# Patient Record
Sex: MALE | Race: BLACK OR AFRICAN AMERICAN | NOT HISPANIC OR LATINO | Employment: FULL TIME | URBAN - METROPOLITAN AREA
[De-identification: names, ages, dates, MRNs, and addresses within clinical notes are randomized per-mention and may not be internally consistent; named-entity substitution may affect disease eponyms.]

---

## 2017-01-21 DIAGNOSIS — I10 ESSENTIAL HYPERTENSION: ICD-10-CM

## 2017-01-23 RX ORDER — METOPROLOL SUCCINATE 25 MG/1
TABLET, EXTENDED RELEASE ORAL
Qty: 30 TABLET | Refills: 2 | Status: SHIPPED | OUTPATIENT
Start: 2017-01-23 | End: 2017-05-03 | Stop reason: SDUPTHER

## 2017-01-23 NOTE — TELEPHONE ENCOUNTER
Patient due for follow-up (not urgent), no-showed last appt. Please contact to reschedule. Med refill sent in

## 2017-01-25 NOTE — TELEPHONE ENCOUNTER
Spoke with pt-- unable to schedule pt's appointment because pt has been dismissed from the [facility] area due to credit risk. Pt advised to contact [facility].

## 2017-03-27 ENCOUNTER — TELEPHONE (OUTPATIENT)
Dept: SMOKING CESSATION | Facility: CLINIC | Age: 57
End: 2017-03-27

## 2017-03-28 ENCOUNTER — TELEPHONE (OUTPATIENT)
Dept: SMOKING CESSATION | Facility: CLINIC | Age: 57
End: 2017-03-28

## 2017-03-29 ENCOUNTER — TELEPHONE (OUTPATIENT)
Dept: SMOKING CESSATION | Facility: CLINIC | Age: 57
End: 2017-03-29

## 2017-04-17 DIAGNOSIS — E11.9 TYPE 2 DIABETES MELLITUS WITHOUT COMPLICATION: ICD-10-CM

## 2017-05-03 DIAGNOSIS — I10 ESSENTIAL HYPERTENSION: ICD-10-CM

## 2017-05-03 RX ORDER — METOPROLOL SUCCINATE 25 MG/1
TABLET, EXTENDED RELEASE ORAL
Qty: 30 TABLET | Refills: 1 | Status: SHIPPED | OUTPATIENT
Start: 2017-05-03 | End: 2017-06-29 | Stop reason: SDUPTHER

## 2017-05-08 DIAGNOSIS — E11.9 TYPE 2 DIABETES MELLITUS WITHOUT COMPLICATION: ICD-10-CM

## 2017-05-28 DIAGNOSIS — I10 ESSENTIAL HYPERTENSION: ICD-10-CM

## 2017-05-28 DIAGNOSIS — E11.9 CONTROLLED TYPE 2 DIABETES MELLITUS WITHOUT COMPLICATION, WITH LONG-TERM CURRENT USE OF INSULIN: Primary | ICD-10-CM

## 2017-05-28 DIAGNOSIS — E78.5 DYSLIPIDEMIA: ICD-10-CM

## 2017-05-28 DIAGNOSIS — D64.9 ANEMIA, UNSPECIFIED TYPE: ICD-10-CM

## 2017-05-28 DIAGNOSIS — Z79.4 CONTROLLED TYPE 2 DIABETES MELLITUS WITHOUT COMPLICATION, WITH LONG-TERM CURRENT USE OF INSULIN: Primary | ICD-10-CM

## 2017-05-29 RX ORDER — LOSARTAN POTASSIUM 100 MG/1
TABLET ORAL
Qty: 90 TABLET | Refills: 0 | Status: SHIPPED | OUTPATIENT
Start: 2017-05-29 | End: 2019-02-21 | Stop reason: SDUPTHER

## 2017-05-29 NOTE — TELEPHONE ENCOUNTER
No appointments can be scheduled for this patient until he make arrangements with [facility] [phone]

## 2017-05-29 NOTE — TELEPHONE ENCOUNTER
Patient no-show to last appt, needs to reschedule follow up appt. Please contact him to schedule. Refill sent in for 90-day supply but he will need to come in for appt for further refills.     He is also due for labs - CBC, BMP, A1c, lipids, microalb/creatinine. Please schedule for fasting labs before visit

## 2017-05-30 NOTE — TELEPHONE ENCOUNTER
Please check with him to see if he plans to continue with primary care here or establish elsewhere. Happy to see him here if that's his plan; otherwise I do want him to continue to have primary care somewhere, and unfortunately cannot continue to fill his medications indefinitely without a visit or labs.

## 2017-06-16 DIAGNOSIS — E11.9 TYPE 2 DIABETES MELLITUS WITHOUT COMPLICATION: ICD-10-CM

## 2017-06-16 DIAGNOSIS — E78.5 DYSLIPIDEMIA: ICD-10-CM

## 2017-06-16 RX ORDER — ATORVASTATIN CALCIUM 20 MG/1
TABLET, FILM COATED ORAL
Qty: 90 TABLET | Refills: 0 | Status: SHIPPED | OUTPATIENT
Start: 2017-06-16 | End: 2019-02-21 | Stop reason: SDUPTHER

## 2017-06-16 RX ORDER — METFORMIN HYDROCHLORIDE 500 MG/1
TABLET ORAL
Qty: 180 TABLET | Refills: 0 | Status: SHIPPED | OUTPATIENT
Start: 2017-06-16 | End: 2019-02-21 | Stop reason: SDUPTHER

## 2017-06-19 DIAGNOSIS — I10 ESSENTIAL HYPERTENSION: Chronic | ICD-10-CM

## 2017-06-19 RX ORDER — AMLODIPINE BESYLATE 10 MG/1
TABLET ORAL
Qty: 90 TABLET | Refills: 0 | Status: CANCELLED | OUTPATIENT
Start: 2017-06-19

## 2017-06-21 DIAGNOSIS — I10 ESSENTIAL HYPERTENSION: Chronic | ICD-10-CM

## 2017-06-21 RX ORDER — AMLODIPINE BESYLATE 10 MG/1
10 TABLET ORAL DAILY
Qty: 90 TABLET | Refills: 0 | Status: SHIPPED | OUTPATIENT
Start: 2017-06-21 | End: 2019-02-21 | Stop reason: SDUPTHER

## 2017-06-21 NOTE — TELEPHONE ENCOUNTER
----- Message from Belia Mcintosh sent at 6/21/2017  9:47 AM CDT -----  Contact: [facility]  Refill    amlodipine (NORVASC) 10 MG tablet   Sig - Route: Take 1 tablet (10 mg total) by mouth once daily. - Oral    90 day fill    [facility] [id number] [place] [address] [address] AT [facility] [phone] (phone) [phone] (fax)    Thanks!

## 2017-06-22 NOTE — TELEPHONE ENCOUNTER
If he is not able to follow up here, I would recommend that he establish with another primary care provider which is more affordable for him; I am happy to see him back whenever he can schedule an appointment, but he does need to follow with a primary care provider somewhere due to his high blood pressure and diabetes.

## 2017-06-22 NOTE — TELEPHONE ENCOUNTER
Called pt left Vm requesting return call. No appointments can be scheduled for pt due credit Risk. Please advise.

## 2017-06-22 NOTE — TELEPHONE ENCOUNTER
Patient overdue for follow up. No show to last scheduled appt 12/2016.  Please contact him to schedule new follow up appt.  Refill sent but he needs appointment for further refills to be given

## 2017-06-29 DIAGNOSIS — I10 ESSENTIAL HYPERTENSION: ICD-10-CM

## 2017-06-29 RX ORDER — METOPROLOL SUCCINATE 25 MG/1
TABLET, EXTENDED RELEASE ORAL
Qty: 30 TABLET | Refills: 1 | Status: SHIPPED | OUTPATIENT
Start: 2017-06-29 | End: 2019-02-21 | Stop reason: SDUPTHER

## 2017-09-14 ENCOUNTER — TELEPHONE (OUTPATIENT)
Dept: SMOKING CESSATION | Facility: CLINIC | Age: 57
End: 2017-09-14

## 2017-09-15 ENCOUNTER — TELEPHONE (OUTPATIENT)
Dept: SMOKING CESSATION | Facility: CLINIC | Age: 57
End: 2017-09-15

## 2017-09-18 ENCOUNTER — TELEPHONE (OUTPATIENT)
Dept: SMOKING CESSATION | Facility: CLINIC | Age: 57
End: 2017-09-18

## 2017-10-24 ENCOUNTER — TELEPHONE (OUTPATIENT)
Dept: SMOKING CESSATION | Facility: CLINIC | Age: 57
End: 2017-10-24

## 2017-10-25 ENCOUNTER — TELEPHONE (OUTPATIENT)
Dept: SMOKING CESSATION | Facility: CLINIC | Age: 57
End: 2017-10-25

## 2017-10-31 ENCOUNTER — TELEPHONE (OUTPATIENT)
Dept: SMOKING CESSATION | Facility: CLINIC | Age: 57
End: 2017-10-31

## 2018-03-22 ENCOUNTER — TELEPHONE (OUTPATIENT)
Dept: SMOKING CESSATION | Facility: CLINIC | Age: 58
End: 2018-03-22

## 2018-03-28 ENCOUNTER — TELEPHONE (OUTPATIENT)
Dept: SMOKING CESSATION | Facility: CLINIC | Age: 58
End: 2018-03-28

## 2018-07-14 ENCOUNTER — PATIENT MESSAGE (OUTPATIENT)
Dept: ADMINISTRATIVE | Facility: OTHER | Age: 58
End: 2018-07-14

## 2018-07-18 ENCOUNTER — PATIENT MESSAGE (OUTPATIENT)
Dept: ADMINISTRATIVE | Facility: OTHER | Age: 58
End: 2018-07-18

## 2018-08-20 ENCOUNTER — PATIENT MESSAGE (OUTPATIENT)
Dept: ADMINISTRATIVE | Facility: HOSPITAL | Age: 58
End: 2018-08-20

## 2019-02-21 ENCOUNTER — HOSPITAL ENCOUNTER (OUTPATIENT)
Dept: RADIOLOGY | Facility: HOSPITAL | Age: 59
Discharge: HOME | End: 2019-02-21
Attending: INTERNAL MEDICINE
Payer: COMMERCIAL

## 2019-02-21 ENCOUNTER — OFFICE VISIT (OUTPATIENT)
Dept: INTERNAL MEDICINE | Facility: CLINIC | Age: 59
End: 2019-02-21
Payer: COMMERCIAL

## 2019-02-21 VITALS
WEIGHT: 231.94 LBS | DIASTOLIC BLOOD PRESSURE: 96 MMHG | OXYGEN SATURATION: 97 % | HEART RATE: 67 BPM | BODY MASS INDEX: 36.4 KG/M2 | SYSTOLIC BLOOD PRESSURE: 180 MMHG | TEMPERATURE: 99 F | HEIGHT: 67 IN

## 2019-02-21 DIAGNOSIS — J06.9 UPPER RESPIRATORY TRACT INFECTION, UNSPECIFIED TYPE: ICD-10-CM

## 2019-02-21 DIAGNOSIS — Z72.0 SMOKING TRYING TO QUIT: ICD-10-CM

## 2019-02-21 DIAGNOSIS — E78.5 DYSLIPIDEMIA: ICD-10-CM

## 2019-02-21 DIAGNOSIS — F10.10 ALCOHOL ABUSE, DAILY USE: ICD-10-CM

## 2019-02-21 DIAGNOSIS — L73.2 HIDRADENITIS SUPPURATIVA: ICD-10-CM

## 2019-02-21 DIAGNOSIS — R91.8 LUNG FIELD ABNORMAL FINDING ON EXAMINATION: ICD-10-CM

## 2019-02-21 DIAGNOSIS — F17.210 CIGARETTE NICOTINE DEPENDENCE WITHOUT COMPLICATION: Chronic | ICD-10-CM

## 2019-02-21 DIAGNOSIS — I10 ESSENTIAL HYPERTENSION: Primary | Chronic | ICD-10-CM

## 2019-02-21 DIAGNOSIS — E11.9 TYPE 2 DIABETES MELLITUS WITHOUT COMPLICATION, WITHOUT LONG-TERM CURRENT USE OF INSULIN: ICD-10-CM

## 2019-02-21 DIAGNOSIS — Z63.4 BEREAVEMENT: ICD-10-CM

## 2019-02-21 LAB
INFLUENZA A, MOLECULAR: NEGATIVE
INFLUENZA B, MOLECULAR: NEGATIVE
SPECIMEN SOURCE: NORMAL

## 2019-02-21 PROCEDURE — 3008F PR BODY MASS INDEX (BMI) DOCUMENTED: ICD-10-PCS | Mod: CPTII,S$GLB,, | Performed by: INTERNAL MEDICINE

## 2019-02-21 PROCEDURE — 99999 PR PBB SHADOW E&M-EST. PATIENT-LVL V: CPT | Mod: PBBFAC,,, | Performed by: INTERNAL MEDICINE

## 2019-02-21 PROCEDURE — 3080F PR MOST RECENT DIASTOLIC BLOOD PRESSURE >= 90 MM HG: ICD-10-PCS | Mod: CPTII,S$GLB,, | Performed by: INTERNAL MEDICINE

## 2019-02-21 PROCEDURE — 3077F SYST BP >= 140 MM HG: CPT | Mod: CPTII,S$GLB,, | Performed by: INTERNAL MEDICINE

## 2019-02-21 PROCEDURE — 3077F PR MOST RECENT SYSTOLIC BLOOD PRESSURE >= 140 MM HG: ICD-10-PCS | Mod: CPTII,S$GLB,, | Performed by: INTERNAL MEDICINE

## 2019-02-21 PROCEDURE — 71046 X-RAY EXAM CHEST 2 VIEWS: CPT | Mod: TC

## 2019-02-21 PROCEDURE — 99215 PR OFFICE/OUTPT VISIT, EST, LEVL V, 40-54 MIN: ICD-10-PCS | Mod: S$GLB,,, | Performed by: INTERNAL MEDICINE

## 2019-02-21 PROCEDURE — 71046 XR CHEST PA AND LATERAL: ICD-10-PCS | Mod: 26,,, | Performed by: RADIOLOGY

## 2019-02-21 PROCEDURE — 3008F BODY MASS INDEX DOCD: CPT | Mod: CPTII,S$GLB,, | Performed by: INTERNAL MEDICINE

## 2019-02-21 PROCEDURE — 99215 OFFICE O/P EST HI 40 MIN: CPT | Mod: S$GLB,,, | Performed by: INTERNAL MEDICINE

## 2019-02-21 PROCEDURE — 99999 PR PBB SHADOW E&M-EST. PATIENT-LVL V: ICD-10-PCS | Mod: PBBFAC,,, | Performed by: INTERNAL MEDICINE

## 2019-02-21 PROCEDURE — 87502 INFLUENZA DNA AMP PROBE: CPT

## 2019-02-21 PROCEDURE — 3080F DIAST BP >= 90 MM HG: CPT | Mod: CPTII,S$GLB,, | Performed by: INTERNAL MEDICINE

## 2019-02-21 PROCEDURE — 71046 X-RAY EXAM CHEST 2 VIEWS: CPT | Mod: 26,,, | Performed by: RADIOLOGY

## 2019-02-21 RX ORDER — AMLODIPINE BESYLATE 10 MG/1
10 TABLET ORAL DAILY
Qty: 90 TABLET | Refills: 1 | Status: SHIPPED | OUTPATIENT
Start: 2019-02-21 | End: 2019-05-23

## 2019-02-21 RX ORDER — BLOOD SUGAR DIAGNOSTIC
STRIP MISCELLANEOUS
Qty: 100 EACH | Refills: 3 | Status: SHIPPED | OUTPATIENT
Start: 2019-02-21

## 2019-02-21 RX ORDER — METFORMIN HYDROCHLORIDE 500 MG/1
TABLET ORAL
Qty: 180 TABLET | Refills: 1 | Status: SHIPPED | OUTPATIENT
Start: 2019-02-21 | End: 2019-10-22

## 2019-02-21 RX ORDER — AMOXICILLIN AND CLAVULANATE POTASSIUM 875; 125 MG/1; MG/1
1 TABLET, FILM COATED ORAL EVERY 12 HOURS
Qty: 10 TABLET | Refills: 0 | Status: SHIPPED | OUTPATIENT
Start: 2019-02-21 | End: 2019-02-26

## 2019-02-21 RX ORDER — LOSARTAN POTASSIUM 100 MG/1
TABLET ORAL
Qty: 90 TABLET | Refills: 1 | Status: SHIPPED | OUTPATIENT
Start: 2019-02-21 | End: 2020-05-07 | Stop reason: SDUPTHER

## 2019-02-21 RX ORDER — METOPROLOL SUCCINATE 25 MG/1
TABLET, EXTENDED RELEASE ORAL
Qty: 30 TABLET | Refills: 5 | Status: SHIPPED | OUTPATIENT
Start: 2019-02-21 | End: 2019-03-20 | Stop reason: ALTCHOICE

## 2019-02-21 RX ORDER — ATORVASTATIN CALCIUM 20 MG/1
20 TABLET, FILM COATED ORAL DAILY
Qty: 90 TABLET | Refills: 3 | Status: SHIPPED | OUTPATIENT
Start: 2019-02-21 | End: 2019-10-22

## 2019-02-21 SDOH — SOCIAL DETERMINANTS OF HEALTH (SDOH): DISSAPEARANCE AND DEATH OF FAMILY MEMBER: Z63.4

## 2019-02-21 NOTE — PATIENT INSTRUCTIONS
Blood Pressure Measurement:    -- Please record your blood pressure every day. When checking, make sure you have been sitting for about 5 minutes, your legs are uncrossed, and the blood pressure cuff at the level of your heart. Record your blood pressure with the date and time in a log and bring it with you to every doctor's visit.  -- Goal blood pressure is top number less than 130 and bottom number less than 80.  -- If your blood pressure is >160/>100 on two consecutive occasions, contact the office. If it is >180/>120 and you are having confusion, chest pain or back pain, shortness of breath, or blood in the urine, go to the emergency room immediately.      Established High Blood Pressure    High blood pressure (hypertension) is a chronic disease. Often, healthcare providers dont know what causes it. But it can be caused by certain health conditions and medicines.  If you have high blood pressure, you may not have any symptoms. If you do have symptoms, they may include headache, dizziness, changes in your vision, chest pain, and shortness of breath. But even without symptoms, high blood pressure thats not treated raises your risk for heart attack and stroke. High blood pressure is a serious health risk and shouldnt be ignored.  A blood pressure reading is made up of two numbers: a higher number over a lower number. The top number is the systolic pressure. The bottom number is the diastolic pressure. A normal blood pressure is a systolic pressure of  less than 120 over a diastolic pressure of less than 80. You will see your blood pressure readings written together. For example, a person with a systolic pressure of 188 and a diastolic pressure of 78 will have 118/78 written in the medical record.  High blood pressure is when either the top number is 140 or higher, or the bottom number is 90 or higher. This must be the result when taking your blood pressure a number of times. The blood pressures between normal  and high are called prehypertension.  Home care  If you have high blood pressure, you should do what is listed below to lower your blood pressure. If you are taking medicines for high blood pressure, these methods may reduce or end your need for medicines in the future.  · Begin a weight-loss program if you are overweight.  · Cut back on how much salt you get in your diet. Heres how to do this:  ¨ Dont eat foods that have a lot of salt. These include olives, pickles, smoked meats, and salted potato chips.  ¨ Dont add salt to your food at the table.  ¨ Use only small amounts of salt when cooking.  · Start an exercise program. Talk with your healthcare provider about the type of exercise program that would be best for you. It doesn't have to be hard. Even brisk walking for 20 minutes 3 times a week is a good form of exercise.  · Dont take medicines that stimulate the heart. This includes many over-the-counter cold and sinus decongestant pills and sprays, as well as diet pills. Check the warnings about hypertension on the label. Before buying any over-the-counter medicines or supplements, always ask the pharmacist about the product's potential interaction with your high blood pressure and your high blood pressure medicines.  · Stimulants such as amphetamine or cocaine could be deadly for someone with high blood pressure. Never take these.  · Limit how much caffeine you get in your diet. Switch to caffeine-free products.  · Stop smoking. If you are a long-time smoker, this can be hard. Talk to your healthcare provider about medicines and nicotine replacement options to help you. Also, enroll in a stop-smoking program to make it more likely that you will quit for good.  · Learn how to handle stress. This is an important part of any program to lower blood pressure. Learn about relaxation methods like meditation, yoga, or biofeedback.  · If your provider prescribed medicines, take them exactly as directed. Missing  doses may cause your blood pressure get out of control.  · If you miss a dose or doses, check with your healthcare provider or pharmacist about what to do.  · Consider buying an automatic blood pressure machine. Ask your provider for a recommendation. You can get one of these at most pharmacies.     The American Heart Association recommends the following guidelines for home blood pressure monitoring:  · Don't smoke or drink coffee for 30 minutes before taking your blood pressure.  · Go to the bathroom before the test.  · Relax for 5 minutes before taking the measurement.  · Sit with your back supported (don't sit on a couch or soft chair); keep your feet on the floor uncrossed. Place your arm on a solid flat surface (like a table) with the upper part of the arm at heart level. Place the middle of the cuff directly above the eye of the elbow. Check the monitor's instruction manual for an illustration.  · Take multiple readings. When you measure, take 2 to 3 readings one minute apart and record all of the results.  · Take your blood pressure at the same time every day, or as your healthcare provider recommends.  · Record the date, time, and blood pressure reading.  · Take the record with you to your next medical appointment. If your blood pressure monitor has a built-in memory, simply take the monitor with you to your next appointment.  · Call your provider if you have several high readings. Don't be frightened by a single high blood pressure reading, but if you get several high readings, check in with your healthcare provider.  · Note: When blood pressure reaches a systolic (top number) of 180 or higher OR diastolic (bottom number) of 110 or higher, seek emergency medical treatment.  Follow-up care  You will need to see your healthcare provider regularly. This is to check your blood pressure and to make changes to your medicines. Make a follow-up appointment as directed. Bring the record of your home blood pressure  readings to the appointment.  When to seek medical advice  Call your healthcare provider right away if any of these occur:  · Blood pressure reaches a systolic (upper number) of 180 or higher OR a diastolic (bottom number) of 110 or higher  · Chest pain or shortness of breath  · Severe headache  · Throbbing or rushing sound in the ears  · Nosebleed  · Sudden severe pain in your belly (abdomen)  · Extreme drowsiness, confusion, or fainting  · Dizziness or spinning sensation (vertigo)  · Weakness of an arm or leg or one side of the face  · You have problems speaking or seeing   Date Last Reviewed: 12/1/2016 © 2000-2017 [employer]. [address] [place] [address], [place] [address]. All rights reserved. This information is not intended as a substitute for professional medical care. Always follow your healthcare professional's instructions.          Managing Diabetes: The A1C Test       Healthy red blood cells have some glucose stuck to them. A high A1C means that unhealthy amounts of glucose are stuck to the cells.   What is the A1C test?  Using your meter helps you track your blood sugar every day. But your glucose meter tells you the value at the time of testing only. You also need to know if your treatment plan is keeping you healthy over time. The hemoglobin A1C (or glycated hemoglobin) test can help. This test measures your average blood sugar level over a few months. A higher A1C result means that you have a higher risk of developing complications.  The A1C test  The A1C is a blood test done by your healthcare provider. You will likely have an A1C test every 3 to 6 months.  Your blood glucose goal  A1C has been shown as a percentage. But it can also be shown as a number representing the estimated Average Glucose (eAG). Unlike the A1C percentage, eAG is a number similar to the numbers listed on your daily glucose monitor. Both A1C and eAG measure the amount of glucose stuck to a protein called hemoglobin in  red blood cells. Your healthcare provider will help you figure out what your ideal A1C or eAG should be. Your target number will depend on your age, general health, and other factors. If your current number is too high, your treatment plan may need changes, such as different medicines.  Sample results  Most people aim for an A1c lower than 7%. Thats an eAG less than 154 mg/dL. Or, your healthcare provider may want you to aim for an A1C of 6%. Thats an eAG of 126 mg/dL.     Glucose calculator  Visit http://professional.diabetes.org/diapro/glucose_calc for a chart that helps convert your A1C percentages into eAG numbers.   Date Last Reviewed: 6/1/2016  © 4027-9733 [employer]. [address] [place] [address], [place] [address]. All rights reserved. This information is not intended as a substitute for professional medical care. Always follow your healthcare professional's instructions.      Diabetes: Meal Planning    You can help keep your blood sugar level in your target range by eating healthy foods. Your healthcare team can help you create a low-fat, nutritious meal plan. Take an active role in your diabetes management by following your meal plan and working with your healthcare team.  Make your meal plan  A meal plan gives guidelines for the types and amounts of food you should eat. The goal is to balance food and insulin (or other diabetes medications) so your blood sugars will be in your target range. Your dietitian will help you make a flexible meal plan that includes many foods that you like.  Watch serving sizes  Your meal plan will group foods by servings. To learn how much a serving is, start by measuring food portions at each meal. Soon youll know what a serving looks like on your plate. Ask your healthcare provider about how to balance servings of different foods.  Eat from all the food groups  The basis of a healthy meal plan is variety (eating lots of different foods). Choose lean meats, fresh  fruits and vegetables, whole grains, and low-fat or nonfat dairy products. Eating a wide variety of foods provides the nutrients your body needs. It can also keep you from getting bored with your meal plan.  Learn about carbohydrates, fats, and protein  · Carbohydrates are starches, sugars, and fiber. They are found in many foods, including fruit, bread, pasta, milk, and sweets. Of all the foods you eat, carbohydrates have the most effect on your blood sugar. Your dietitian may teach you about carb counting, a way to figure out the number of carbohydrates in a meal.  · Fats have the most calories. They also have the most effect on your weight and your risk of heart disease. When you have diabetes, its important to control your weight and protect your heart. Foods that are high in fat include whole milk, cheese, snack foods, and desserts.  · Protein is important for building and repairing muscles and bones. Choose low-fat protein sources, such as fish, egg whites, and skinless chicken.  Reduce liquid sugars  Extra calories from sodas, sports drinks, and fruit drinks make it hard to keep blood sugar in range. Cut as many liquid sugars from your meal plan as you can.  This includes most fruit juices, which are often high in natural or added sugar. Instead, drink plenty of water and other sugar-free beverages.  Eat less fat  If you need to lose weight, try to reduce the amount of fat in your diet. This can also help lower your cholesterol level to keep blood vessels healthier. Cut fat by using only small amounts of liquid oil for cooking. Read food labels carefully to avoid foods with unhealthy trans fats.  Timing your meals  When it comes to blood sugar control, when you eat is as important as what you eat. You may need to eat several small meals spaced evenly throughout the day to stay in your target range. So dont skip breakfast or wait until late in the day to get most of your calories. Doing so can cause your  blood sugar to rise too high or fall too low.   Date Last Reviewed: 3/1/2016  © 8730-9774 [employer]. [address] [place] [address], [place] [address]. All rights reserved. This information is not intended as a substitute for professional medical care. Always follow your healthcare professional's instructions.      For your symptoms (sinusitis, congestion, cough):  1. Saline nasal spray ([employer]) or nasal rinse ([employer], arm & hammer) at least 2-3 times/day  2. Flonase (fluticasone) or other steroid nasal spray - twice a day for 1 week, then once a day thereafter  3. Claritin (loratadine), Zyrtec (cetirizine), or Allegra (fexofenadine) once a day OR cold/flu medication such as CORICIDIN HBP  4. Mucinex (guaifenesin) every 8-12 hours with plenty of water. If you are having a cough, you can use Mucinex-DM (guaifenesin-dextromethorphan).   5. Hot soup, hot tea with honey and lemon.  6. Plenty of sleep!    These medications are ok to take even if you have high blood pressure.

## 2019-02-21 NOTE — PROGRESS NOTES
Subjective:       Patient ID: Christiano Dahl is a 58 y.o. male.    Chief Complaint: Annual Exam    HPI  57 y/o man with HTN, HLD, DM2, hidradenitis suppurativa here for annual exam.  Last seen 2016. Last labs done 2016.    HTN - dx around .   Meds - losartan 100mg, norvasc 10mg, toprol XL 25mg - off of these for >1 year  Does have a cuff at home, not using.  No headache, vision changes, chest pain, or dyspnea.    HLD - out of statin, needs refill    DM2 - dx around , initially on metformin, then off, then restarted; off again for most of past year  +frequent urination, drinking lots of water  Not checking sugars   Eating carbs, less sweets.    Hidradenitis - not having much problems with this recently. Uses an antibiotic soap, avoids using washcloth and this helps.  H/o cellulitis - none currently    Smoking - 1 ppd x 30+ years. Went to smoking cessation program, did quit for 5-6 months but restarted smoking after his mother . Would like referral back again.    URI symptoms x 4-5 days, +cough, congestion, mild sore throat, postnasal drip and rhinorrhea. No fevers. +sinus headache but no focal facial pain, no ear pain. Fatigue / low energy, feeling a little weak. No myalgias or arthralgias.   Taking robitussin severe cold, emergen-c    Reports that for the past 6-12 months he has had sweating on his head and face every time he eats food.     Mostly cooking at home, lots of salads. Occasional fried foods. Has been cutting down on fast foods. Does eat a lot of carbs - khang pasta, rice, potatoes. Doesn't eat a lot of sweets.  Eats lots of vegetables. Cooks with olive oil, unsalted butter.   Does cook with salt.    Drinking more - drinks every day, at least 4 drinks every day, some days more. Works as a .    Does continue to have problems with ED. Wasn't able to get sildenafil previously due to cost.    Had period of being depressed and struggling a lot with self-care after his mother  and  "then his relationship ended. Doing better now, not feeling depressed, but still feeling under stress, working on taking care of himself.    Review of Systems   Constitutional: Positive for fatigue. Negative for fever and unexpected weight change.   HENT: Positive for congestion, postnasal drip, rhinorrhea, sinus pressure and sore throat (mild). Negative for ear pain, sinus pain and trouble swallowing.    Eyes: Negative for visual disturbance.   Respiratory: Positive for cough. Negative for shortness of breath and wheezing.    Cardiovascular: Negative for chest pain, palpitations and leg swelling.   Gastrointestinal: Negative for abdominal pain, diarrhea and nausea.   Endocrine: Positive for polyuria. Negative for cold intolerance and heat intolerance.   Genitourinary: Positive for frequency. Negative for dysuria.   Musculoskeletal: Negative for gait problem and joint swelling.   Skin: Negative for rash.   Neurological: Positive for light-headedness and headaches. Negative for weakness and numbness.   Psychiatric/Behavioral: Positive for dysphoric mood (improving). Negative for self-injury. The patient is not nervous/anxious.          Past medical history, surgical history, and family medical history reviewed and updated as appropriate.    Medications and allergies reviewed.     Objective:          Vitals:    02/21/19 1457 02/21/19 1515   BP: (!) 190/102 (!) 180/96   BP Location: Left arm    Patient Position: Sitting    Pulse: 67    Temp: 98.6 °F (37 °C)    TempSrc: Oral    SpO2: 97%    Weight: 105.2 kg (231 lb 14.8 oz)    Height: 5' 7" (1.702 m)      Body mass index is 36.32 kg/m².  Physical Exam   Constitutional: He is oriented to person, place, and time. He appears well-developed and well-nourished. No distress.   Appears ill, coughing   HENT:   Head: Normocephalic and atraumatic.   Nose: Mucosal edema (and erythema of nasal turbinates) present.   Mouth/Throat: Posterior oropharyngeal erythema (mild erythema) " present. No oropharyngeal exudate.   Eyes: Conjunctivae and EOM are normal. Pupils are equal, round, and reactive to light.   Neck: Neck supple. No thyromegaly present.   Cardiovascular: Normal rate, regular rhythm and normal heart sounds.   No murmur heard.  Pulmonary/Chest: Effort normal. No respiratory distress.   Coarse breath sounds / rhonchi at R lower lung. No wheezes. Lungs otherwise clear.   Abdominal: Soft. Bowel sounds are normal. There is no tenderness.   Musculoskeletal: He exhibits no edema or tenderness.   Lymphadenopathy:     He has cervical adenopathy (mild bilateral LAD, nontender).   Neurological: He is alert and oriented to person, place, and time. No cranial nerve deficit. Gait normal.   Skin: Skin is warm and dry.   Psychiatric: He has a normal mood and affect. His behavior is normal.   Vitals reviewed.      Lab Results   Component Value Date    WBC 9.72 08/23/2016    HGB 13.5 (L) 08/23/2016    HCT 40.7 08/23/2016     08/23/2016    CHOL 142 03/30/2016    TRIG 127 03/30/2016    HDL 28 (L) 03/30/2016    ALT 13 08/20/2016    AST 9 (L) 08/20/2016     11/23/2016    K 4.1 11/23/2016     11/23/2016    CREATININE 0.9 11/23/2016    BUN 12 11/23/2016    CO2 26 11/23/2016    PSA 0.48 11/23/2016    HGBA1C 6.4 (H) 11/23/2016       Assessment:       1. Essential hypertension    2. Type 2 diabetes mellitus without complication, without long-term current use of insulin    3. Dyslipidemia    4. Smoking trying to quit    5. Cigarette nicotine dependence without complication    6. Alcohol abuse, daily use    7. Upper respiratory tract infection, unspecified type    8. Lung field abnormal finding on examination        Plan:   Christiano was seen today for annual exam.    Diagnoses and all orders for this visit:    Essential hypertension - restart medications. Start with losartan x 1-2 days, then add amlodipine x 2 days, then if not controlled (<130/80) add toprol-XL  Keep log  Bring log & cuff to  next visit  Counseled re: return precautions / risks with uncontrolled HTN  -     amLODIPine (NORVASC) 10 MG tablet; Take 1 tablet (10 mg total) by mouth once daily.  -     losartan (COZAAR) 100 MG tablet; TAKE 1 TABLET(100 MG) BY MOUTH EVERY DAY FOR BLOOD PRESSURE  -     metoprolol succinate (TOPROL-XL) 25 MG 24 hr tablet; TAKE 1 TABLET(25 MG) BY MOUTH EVERY DAY FOR BLOOD PRESSURE  -     TSH; Future    Type 2 diabetes mellitus without complication, without long-term current use of insulin - checking labs, restarting metformin. Suspect uncontrolled DM given off meds for some time  -     metFORMIN (GLUCOPHAGE) 500 MG tablet; TAKE 1 TABLET(500 MG) BY MOUTH TWICE DAILY WITH MEALS FOR DIABETES  -     CBC Without Differential; Future  -     Comprehensive metabolic panel; Future  -     Hemoglobin A1c; Future  -     Vitamin D; Future  -     Lipid panel; Future  -     CONTOUR TEST STRIPS Strp; Use to check blood sugar once daily    Dyslipidemia - restart statin  -     atorvastatin (LIPITOR) 20 MG tablet; Take 1 tablet (20 mg total) by mouth once daily. For cholesterol  -     TSH; Future    Smoking trying to quit - congratulated him on quitting previously, encouraged to try again  -     Ambulatory referral to Smoking Cessation Program  Cigarette nicotine dependence without complication  -     Ambulatory referral to Smoking Cessation Program    Alcohol abuse, daily use - recommended work on reducing use - 2-3 drinks in a day, and try for not every day. Follow up next visit on this  -     Vitamin B12; Future  -     VITAMIN B1; Future    Upper respiratory tract infection, unspecified type - bronchitis, possible pneumonia; checking CXR. Given uncontrolled HTN and DM with risk of complication, treating with antibiotics.  -     Influenza A & B by Molecular  -     X-Ray Chest PA And Lateral; Future  -     amoxicillin-clavulanate 875-125mg (AUGMENTIN) 875-125 mg per tablet; Take 1 tablet by mouth every 12 (twelve) hours. for 5  days  Lung field abnormal finding on examination  -     X-Ray Chest PA And Lateral; Future  -     amoxicillin-clavulanate 875-125mg (AUGMENTIN) 875-125 mg per tablet; Take 1 tablet by mouth every 12 (twelve) hours. for 5 days    ED - discussed; not restarting medication at present given more acute issues but could consider generic sildenafil again next time    Hidradenitis - currently stable with home care    Bereavement, grief, stress - congratulated him on coming in for visit again and pursuing care of self. Denies depression at present, doesn't want medications; will follow. He will contact clinic if wanting referral for counselor, but declines this at this time.    Health maintenance reviewed with patient.   Pt education info given    Follow-up in about 3 months (around 5/21/2019) for hypertension, diabetes.    Nicolas Adams MD  Internal Medicine  [facility]  2/21/2019    45 minutes spent with patient with >50% of visit spent counseling patient regarding conditions documented above and planning for care coordination. All questions answered.

## 2019-02-22 DIAGNOSIS — Z12.11 COLON CANCER SCREENING: ICD-10-CM

## 2019-02-27 ENCOUNTER — LAB VISIT (OUTPATIENT)
Dept: LAB | Facility: HOSPITAL | Age: 59
End: 2019-02-27
Attending: INTERNAL MEDICINE
Payer: COMMERCIAL

## 2019-02-27 DIAGNOSIS — F10.10 ALCOHOL ABUSE, DAILY USE: ICD-10-CM

## 2019-02-27 DIAGNOSIS — E11.9 TYPE 2 DIABETES MELLITUS WITHOUT COMPLICATION, WITHOUT LONG-TERM CURRENT USE OF INSULIN: ICD-10-CM

## 2019-02-27 DIAGNOSIS — E78.5 DYSLIPIDEMIA: ICD-10-CM

## 2019-02-27 DIAGNOSIS — I10 ESSENTIAL HYPERTENSION: Chronic | ICD-10-CM

## 2019-02-27 LAB
25(OH)D3+25(OH)D2 SERPL-MCNC: 7 NG/ML
ALBUMIN SERPL BCP-MCNC: 3.6 G/DL
ALP SERPL-CCNC: 113 U/L
ALT SERPL W/O P-5'-P-CCNC: 26 U/L
ANION GAP SERPL CALC-SCNC: 10 MMOL/L
AST SERPL-CCNC: 17 U/L
BILIRUB SERPL-MCNC: 1.1 MG/DL
BUN SERPL-MCNC: 14 MG/DL
CALCIUM SERPL-MCNC: 9.1 MG/DL
CHLORIDE SERPL-SCNC: 108 MMOL/L
CHOLEST SERPL-MCNC: 137 MG/DL
CHOLEST/HDLC SERPL: 4.4 {RATIO}
CO2 SERPL-SCNC: 23 MMOL/L
CREAT SERPL-MCNC: 0.9 MG/DL
ERYTHROCYTE [DISTWIDTH] IN BLOOD BY AUTOMATED COUNT: 14.9 %
EST. GFR  (AFRICAN AMERICAN): >60 ML/MIN/1.73 M^2
EST. GFR  (NON AFRICAN AMERICAN): >60 ML/MIN/1.73 M^2
ESTIMATED AVG GLUCOSE: 126 MG/DL
GLUCOSE SERPL-MCNC: 95 MG/DL
HBA1C MFR BLD HPLC: 6 %
HCT VFR BLD AUTO: 47.7 %
HDLC SERPL-MCNC: 31 MG/DL
HDLC SERPL: 22.6 %
HGB BLD-MCNC: 15.2 G/DL
LDLC SERPL CALC-MCNC: 83.4 MG/DL
MCH RBC QN AUTO: 27.8 PG
MCHC RBC AUTO-ENTMCNC: 31.9 G/DL
MCV RBC AUTO: 87 FL
NONHDLC SERPL-MCNC: 106 MG/DL
PLATELET # BLD AUTO: 241 K/UL
PMV BLD AUTO: 9.8 FL
POTASSIUM SERPL-SCNC: 4.1 MMOL/L
PROT SERPL-MCNC: 7.8 G/DL
RBC # BLD AUTO: 5.46 M/UL
SODIUM SERPL-SCNC: 141 MMOL/L
TRIGL SERPL-MCNC: 113 MG/DL
TSH SERPL DL<=0.005 MIU/L-ACNC: 1.42 UIU/ML
VIT B12 SERPL-MCNC: 377 PG/ML
WBC # BLD AUTO: 9.48 K/UL

## 2019-02-27 PROCEDURE — 83036 HEMOGLOBIN GLYCOSYLATED A1C: CPT

## 2019-02-27 PROCEDURE — 84425 ASSAY OF VITAMIN B-1: CPT

## 2019-02-27 PROCEDURE — 80053 COMPREHEN METABOLIC PANEL: CPT

## 2019-02-27 PROCEDURE — 80061 LIPID PANEL: CPT

## 2019-02-27 PROCEDURE — 82607 VITAMIN B-12: CPT

## 2019-02-27 PROCEDURE — 84443 ASSAY THYROID STIM HORMONE: CPT

## 2019-02-27 PROCEDURE — 82306 VITAMIN D 25 HYDROXY: CPT

## 2019-02-27 PROCEDURE — 85027 COMPLETE CBC AUTOMATED: CPT

## 2019-02-27 PROCEDURE — 36415 COLL VENOUS BLD VENIPUNCTURE: CPT

## 2019-03-01 DIAGNOSIS — E55.9 VITAMIN D DEFICIENCY: Primary | ICD-10-CM

## 2019-03-01 RX ORDER — ERGOCALCIFEROL 1.25 MG/1
50000 CAPSULE ORAL
Qty: 14 CAPSULE | Refills: 1 | Status: SHIPPED | OUTPATIENT
Start: 2019-03-01 | End: 2019-03-01 | Stop reason: SDUPTHER

## 2019-03-01 RX ORDER — ERGOCALCIFEROL 1.25 MG/1
CAPSULE ORAL
Qty: 14 CAPSULE | Refills: 1 | Status: SHIPPED | OUTPATIENT
Start: 2019-03-01 | End: 2019-05-23 | Stop reason: SDUPTHER

## 2019-03-07 LAB — VIT B1 BLD-MCNC: 54 UG/L (ref 38–122)

## 2019-03-08 ENCOUNTER — TELEPHONE (OUTPATIENT)
Dept: INTERNAL MEDICINE | Facility: CLINIC | Age: 59
End: 2019-03-08

## 2019-03-08 NOTE — TELEPHONE ENCOUNTER
----- Message from Nicolas Adams MD sent at 3/1/2019  4:34 PM CST -----  Labs overall ok except very low vitamin D.   Have sent in prescription for high-dose vitamin D. Would recommend he take this twice per week for 2 weeks, then once per week for 3 months.  a1c still in borderline range but improved from prior  Have sent in rx, please let him know.

## 2019-03-11 NOTE — TELEPHONE ENCOUNTER
I tried calling him again today. His number rings once then there is a busy signal x 2 attempts. The number listed as his work number has been disconnected. Unable to contact patient.

## 2019-03-20 ENCOUNTER — PATIENT MESSAGE (OUTPATIENT)
Dept: INTERNAL MEDICINE | Facility: CLINIC | Age: 59
End: 2019-03-20

## 2019-03-20 ENCOUNTER — OFFICE VISIT (OUTPATIENT)
Dept: INTERNAL MEDICINE | Facility: CLINIC | Age: 59
End: 2019-03-20
Payer: COMMERCIAL

## 2019-03-20 VITALS
WEIGHT: 236.13 LBS | HEART RATE: 66 BPM | OXYGEN SATURATION: 97 % | TEMPERATURE: 98 F | SYSTOLIC BLOOD PRESSURE: 170 MMHG | DIASTOLIC BLOOD PRESSURE: 90 MMHG | HEIGHT: 67 IN | BODY MASS INDEX: 37.06 KG/M2

## 2019-03-20 DIAGNOSIS — Z72.0 TOBACCO USE: ICD-10-CM

## 2019-03-20 DIAGNOSIS — I10 ESSENTIAL HYPERTENSION: Primary | Chronic | ICD-10-CM

## 2019-03-20 PROCEDURE — 99214 OFFICE O/P EST MOD 30 MIN: CPT | Mod: S$GLB,,, | Performed by: NURSE PRACTITIONER

## 2019-03-20 PROCEDURE — 3080F DIAST BP >= 90 MM HG: CPT | Mod: CPTII,S$GLB,, | Performed by: NURSE PRACTITIONER

## 2019-03-20 PROCEDURE — 99999 PR PBB SHADOW E&M-EST. PATIENT-LVL V: CPT | Mod: PBBFAC,,, | Performed by: NURSE PRACTITIONER

## 2019-03-20 PROCEDURE — 99999 PR PBB SHADOW E&M-EST. PATIENT-LVL V: ICD-10-PCS | Mod: PBBFAC,,, | Performed by: NURSE PRACTITIONER

## 2019-03-20 PROCEDURE — 3077F PR MOST RECENT SYSTOLIC BLOOD PRESSURE >= 140 MM HG: ICD-10-PCS | Mod: CPTII,S$GLB,, | Performed by: NURSE PRACTITIONER

## 2019-03-20 PROCEDURE — 3077F SYST BP >= 140 MM HG: CPT | Mod: CPTII,S$GLB,, | Performed by: NURSE PRACTITIONER

## 2019-03-20 PROCEDURE — 3008F BODY MASS INDEX DOCD: CPT | Mod: CPTII,S$GLB,, | Performed by: NURSE PRACTITIONER

## 2019-03-20 PROCEDURE — 99214 PR OFFICE/OUTPT VISIT, EST, LEVL IV, 30-39 MIN: ICD-10-PCS | Mod: S$GLB,,, | Performed by: NURSE PRACTITIONER

## 2019-03-20 PROCEDURE — 3080F PR MOST RECENT DIASTOLIC BLOOD PRESSURE >= 90 MM HG: ICD-10-PCS | Mod: CPTII,S$GLB,, | Performed by: NURSE PRACTITIONER

## 2019-03-20 PROCEDURE — 3008F PR BODY MASS INDEX (BMI) DOCUMENTED: ICD-10-PCS | Mod: CPTII,S$GLB,, | Performed by: NURSE PRACTITIONER

## 2019-03-20 RX ORDER — CHLORTHALIDONE 25 MG/1
25 TABLET ORAL DAILY
Qty: 30 TABLET | Refills: 11 | Status: SHIPPED | OUTPATIENT
Start: 2019-03-20 | End: 2019-04-17

## 2019-03-20 NOTE — MEDICAL/APP STUDENT
"Subjective:       Patient ID: Christiano Dahl is a 58 y.o. male.    Chief Complaint: No chief complaint on file.  I have reviewed the notes, assessments, and/or procedures performed by CELINE student, I concur with her/his documentation of Christiano Dahl.  Pt presents today for follow up for BP management. Pt reports BPs have been elevated at home for the past month since his last appointment. He did not bring his home BP log with him or his BP machine.  Pt states he drinks 3-4 bottles of water per day. Diet consist of sandwiches and mostly home cooked meals that he cooks with "some salt". Pt denies eating fast food. Pt denies headache, dizziness, shortness of breath, double vision, syncope, palpitations.       Review of Systems   Constitutional: Negative for appetite change, chills, diaphoresis and fatigue.   HENT: Negative for facial swelling, postnasal drip, rhinorrhea and sneezing.    Eyes: Negative for pain, discharge and itching.   Respiratory: Negative for cough, chest tightness, shortness of breath and wheezing.    Cardiovascular: Negative for chest pain, palpitations and leg swelling.   Gastrointestinal: Negative for abdominal pain, diarrhea, nausea and vomiting.   Endocrine: Negative for polydipsia, polyphagia and polyuria.   Genitourinary: Negative for flank pain, frequency and hematuria.   Musculoskeletal: Negative for arthralgias, gait problem, joint swelling and myalgias.   Skin: Negative for color change, pallor, rash and wound.   Allergic/Immunologic: Negative for environmental allergies and food allergies.   Neurological: Negative for dizziness, tremors, seizures, syncope, speech difficulty, weakness, light-headedness, numbness and headaches.   Hematological: Negative for adenopathy.   Psychiatric/Behavioral: Negative for agitation and sleep disturbance. The patient is not nervous/anxious.        Objective:      Physical Exam   Constitutional: He appears well-developed and well-nourished.  Non-toxic " appearance. No distress.   HENT:   Head: Normocephalic and atraumatic.   Right Ear: Hearing, tympanic membrane, external ear and ear canal normal.   Left Ear: Hearing, tympanic membrane, external ear and ear canal normal.   Nose: Nose normal.   Mouth/Throat: Uvula is midline, oropharynx is clear and moist and mucous membranes are normal. No oropharyngeal exudate.   Eyes: Conjunctivae are normal. Pupils are equal, round, and reactive to light.   Neck: Normal range of motion. Neck supple.   Cardiovascular: Normal rate, regular rhythm, normal heart sounds and intact distal pulses.   No murmur heard.  Pulses:       Radial pulses are 2+ on the right side, and 2+ on the left side.   Pulmonary/Chest: Effort normal and breath sounds normal. No respiratory distress. He has no wheezes.   Abdominal: Soft.   Musculoskeletal: Normal range of motion. He exhibits no edema, tenderness or deformity.   Lymphadenopathy:     He has no cervical adenopathy.   Neurological: He is alert. GCS eye subscore is 4. GCS verbal subscore is 5. GCS motor subscore is 6.   Skin: Skin is warm and dry. Capillary refill takes less than 2 seconds. No rash noted. He is not diaphoretic. No erythema. No pallor.   Psychiatric: He has a normal mood and affect. His speech is normal and behavior is normal. Judgment and thought content normal. Cognition and memory are normal.   Nursing note and vitals reviewed.      Assessment:       1. Essential hypertension    2. Tobacco use        Plan:       Diagnoses and all orders for this visit:    Essential hypertension  -     chlorthalidone (HYGROTEN) 25 MG Tab; Take 1 tablet (25 mg total) by mouth once daily.  -     Ambulatory referral to Smoking Cessation Program    Tobacco use  -     Ambulatory referral to Smoking Cessation Program      Patient Instructions     Low-Salt Choices  Eating salt (sodium) can make your body retain too much water. Excess water makes your heart work harder. Canned, packaged, and frozen foods  are easy to prepare, but they are often high in sodium. Here are some ideas for low-salt foods you can easily prepare yourself.    For breakfast  · Fruit or 100% fruit juice  · Whole-wheat bread or an English muffin. Compare sodium content on labels.  · Low-fat milk or yogurt  · Unsalted eggs  · Shredded wheat  · Corn tortillas  · Unsalted steamed rice  · Regular (not instant) hot cereal, made without salt  Stay away from:  · Sausage, ziegler, and ham  · Flour tortillas  · Packaged muffins, pancakes, and biscuits  · Instant hot cereals  · Cottage cheese  For lunch and dinner  · Fresh fish, chicken, turkey, or meat--baked, broiled, or roasted without salt  · Dry beans, cooked without salt  · Tofu, stir-fried without salt  · Unsalted fresh fruit and vegetables, or frozen or canned fruit and vegetables with no added salt  Stay away from:  · Lunch or deli meat that is cured or smoked  · Cheese  · Tomato juice and catsup  · Canned vegetables, soups, and fish not labeled as no-salt-added or reduced sodium  · Packaged gravies and sauces  · Olives, pickles, and relish  · Bottled salad dressings  For snacks and desserts  · Yogurt  · Unsalted, air popped popcorn  · Unsalted nuts or seeds  Stay away from:  · Pies and cakes  · Packaged dessert mixes  · Pizza  · Canned and packaged puddings  · Pretzels, chips, crackers, and nuts--unless the label says unsalted  Date Last Reviewed: 6/17/2015  © 4480-1420 [employer]. [address] [place] [address]. All rights reserved. This information is not intended as a substitute for professional medical care. Always follow your healthcare professional's instructions.      Established High Blood Pressure    High blood pressure (hypertension) is a chronic disease. Often, healthcare providers dont know what causes it. But it can be caused by certain health conditions and medicines.  If you have high blood pressure, you may not have any symptoms. If you do have symptoms,  they may include headache, dizziness, changes in your vision, chest pain, and shortness of breath. But even without symptoms, high blood pressure thats not treated raises your risk for heart attack and stroke. High blood pressure is a serious health risk and shouldnt be ignored.  A blood pressure reading is made up of two numbers: a higher number over a lower number. The top number is the systolic pressure. The bottom number is the diastolic pressure. A normal blood pressure is a systolic pressure of  less than 120 over a diastolic pressure of less than 80. You will see your blood pressure readings written together. For example, a person with a systolic pressure of 188 and a diastolic pressure of 78 will have 118/78 written in the medical record.  High blood pressure is when either the top number is 140 or higher, or the bottom number is 90 or higher. This must be the result when taking your blood pressure a number of times. The blood pressures between normal and high are called prehypertension.  Home care  If you have high blood pressure, you should do what is listed below to lower your blood pressure. If you are taking medicines for high blood pressure, these methods may reduce or end your need for medicines in the future.  · Begin a weight-loss program if you are overweight.  · Cut back on how much salt you get in your diet. Heres how to do this:  ¨ Dont eat foods that have a lot of salt. These include olives, pickles, smoked meats, and salted potato chips.  ¨ Dont add salt to your food at the table.  ¨ Use only small amounts of salt when cooking.  · Start an exercise program. Talk with your healthcare provider about the type of exercise program that would be best for you. It doesn't have to be hard. Even brisk walking for 20 minutes 3 times a week is a good form of exercise.  · Dont take medicines that stimulate the heart. This includes many over-the-counter cold and sinus decongestant pills and sprays, as  well as diet pills. Check the warnings about hypertension on the label. Before buying any over-the-counter medicines or supplements, always ask the pharmacist about the product's potential interaction with your high blood pressure and your high blood pressure medicines.  · Stimulants such as amphetamine or cocaine could be deadly for someone with high blood pressure. Never take these.  · Limit how much caffeine you get in your diet. Switch to caffeine-free products.  · Stop smoking. If you are a long-time smoker, this can be hard. Talk to your healthcare provider about medicines and nicotine replacement options to help you. Also, enroll in a stop-smoking program to make it more likely that you will quit for good.  · Learn how to handle stress. This is an important part of any program to lower blood pressure. Learn about relaxation methods like meditation, yoga, or biofeedback.  · If your provider prescribed medicines, take them exactly as directed. Missing doses may cause your blood pressure get out of control.  · If you miss a dose or doses, check with your healthcare provider or pharmacist about what to do.  · Consider buying an automatic blood pressure machine. Ask your provider for a recommendation. You can get one of these at most pharmacies.     The American Heart Association recommends the following guidelines for home blood pressure monitoring:  · Don't smoke or drink coffee for 30 minutes before taking your blood pressure.  · Go to the bathroom before the test.  · Relax for 5 minutes before taking the measurement.  · Sit with your back supported (don't sit on a couch or soft chair); keep your feet on the floor uncrossed. Place your arm on a solid flat surface (like a table) with the upper part of the arm at heart level. Place the middle of the cuff directly above the eye of the elbow. Check the monitor's instruction manual for an illustration.  · Take multiple readings. When you measure, take 2 to 3  readings one minute apart and record all of the results.  · Take your blood pressure at the same time every day, or as your healthcare provider recommends.  · Record the date, time, and blood pressure reading.  · Take the record with you to your next medical appointment. If your blood pressure monitor has a built-in memory, simply take the monitor with you to your next appointment.  · Call your provider if you have several high readings. Don't be frightened by a single high blood pressure reading, but if you get several high readings, check in with your healthcare provider.  · Note: When blood pressure reaches a systolic (top number) of 180 or higher OR diastolic (bottom number) of 110 or higher, seek emergency medical treatment.  Follow-up care  You will need to see your healthcare provider regularly. This is to check your blood pressure and to make changes to your medicines. Make a follow-up appointment as directed. Bring the record of your home blood pressure readings to the appointment.  When to seek medical advice  Call your healthcare provider right away if any of these occur:  · Blood pressure reaches a systolic (upper number) of 180 or higher OR a diastolic (bottom number) of 110 or higher  · Chest pain or shortness of breath  · Severe headache  · Throbbing or rushing sound in the ears  · Nosebleed  · Sudden severe pain in your belly (abdomen)  · Extreme drowsiness, confusion, or fainting  · Dizziness or spinning sensation (vertigo)  · Weakness of an arm or leg or one side of the face  · You have problems speaking or seeing   Date Last Reviewed: 12/1/2016  © 8184-2611 [employer]. [address] [place] [address], [place] [address]. All rights reserved. This information is not intended as a substitute for professional medical care. Always follow your healthcare professional's instructions.        Stop Toprol, start Chlorthalidone, continue Losartan and Norvasc.      Limit sodium to 2000 mg a day    Call  for any concerns

## 2019-03-20 NOTE — PATIENT INSTRUCTIONS
Low-Salt Choices  Eating salt (sodium) can make your body retain too much water. Excess water makes your heart work harder. Canned, packaged, and frozen foods are easy to prepare, but they are often high in sodium. Here are some ideas for low-salt foods you can easily prepare yourself.    For breakfast  · Fruit or 100% fruit juice  · Whole-wheat bread or an English muffin. Compare sodium content on labels.  · Low-fat milk or yogurt  · Unsalted eggs  · Shredded wheat  · Corn tortillas  · Unsalted steamed rice  · Regular (not instant) hot cereal, made without salt  Stay away from:  · Sausage, ziegler, and ham  · Flour tortillas  · Packaged muffins, pancakes, and biscuits  · Instant hot cereals  · Cottage cheese  For lunch and dinner  · Fresh fish, chicken, turkey, or meat--baked, broiled, or roasted without salt  · Dry beans, cooked without salt  · Tofu, stir-fried without salt  · Unsalted fresh fruit and vegetables, or frozen or canned fruit and vegetables with no added salt  Stay away from:  · Lunch or deli meat that is cured or smoked  · Cheese  · Tomato juice and catsup  · Canned vegetables, soups, and fish not labeled as no-salt-added or reduced sodium  · Packaged gravies and sauces  · Olives, pickles, and relish  · Bottled salad dressings  For snacks and desserts  · Yogurt  · Unsalted, air popped popcorn  · Unsalted nuts or seeds  Stay away from:  · Pies and cakes  · Packaged dessert mixes  · Pizza  · Canned and packaged puddings  · Pretzels, chips, crackers, and nuts--unless the label says unsalted  Date Last Reviewed: 6/17/2015  © 9832-3688 [employer]. [address] [place] [address], [place] [address]. All rights reserved. This information is not intended as a substitute for professional medical care. Always follow your healthcare professional's instructions.      Established High Blood Pressure    High blood pressure (hypertension) is a chronic disease. Often, healthcare providers dont know what  causes it. But it can be caused by certain health conditions and medicines.  If you have high blood pressure, you may not have any symptoms. If you do have symptoms, they may include headache, dizziness, changes in your vision, chest pain, and shortness of breath. But even without symptoms, high blood pressure thats not treated raises your risk for heart attack and stroke. High blood pressure is a serious health risk and shouldnt be ignored.  A blood pressure reading is made up of two numbers: a higher number over a lower number. The top number is the systolic pressure. The bottom number is the diastolic pressure. A normal blood pressure is a systolic pressure of  less than 120 over a diastolic pressure of less than 80. You will see your blood pressure readings written together. For example, a person with a systolic pressure of 188 and a diastolic pressure of 78 will have 118/78 written in the medical record.  High blood pressure is when either the top number is 140 or higher, or the bottom number is 90 or higher. This must be the result when taking your blood pressure a number of times. The blood pressures between normal and high are called prehypertension.  Home care  If you have high blood pressure, you should do what is listed below to lower your blood pressure. If you are taking medicines for high blood pressure, these methods may reduce or end your need for medicines in the future.  · Begin a weight-loss program if you are overweight.  · Cut back on how much salt you get in your diet. Heres how to do this:  ¨ Dont eat foods that have a lot of salt. These include olives, pickles, smoked meats, and salted potato chips.  ¨ Dont add salt to your food at the table.  ¨ Use only small amounts of salt when cooking.  · Start an exercise program. Talk with your healthcare provider about the type of exercise program that would be best for you. It doesn't have to be hard. Even brisk walking for 20 minutes 3 times a  week is a good form of exercise.  · Dont take medicines that stimulate the heart. This includes many over-the-counter cold and sinus decongestant pills and sprays, as well as diet pills. Check the warnings about hypertension on the label. Before buying any over-the-counter medicines or supplements, always ask the pharmacist about the product's potential interaction with your high blood pressure and your high blood pressure medicines.  · Stimulants such as amphetamine or cocaine could be deadly for someone with high blood pressure. Never take these.  · Limit how much caffeine you get in your diet. Switch to caffeine-free products.  · Stop smoking. If you are a long-time smoker, this can be hard. Talk to your healthcare provider about medicines and nicotine replacement options to help you. Also, enroll in a stop-smoking program to make it more likely that you will quit for good.  · Learn how to handle stress. This is an important part of any program to lower blood pressure. Learn about relaxation methods like meditation, yoga, or biofeedback.  · If your provider prescribed medicines, take them exactly as directed. Missing doses may cause your blood pressure get out of control.  · If you miss a dose or doses, check with your healthcare provider or pharmacist about what to do.  · Consider buying an automatic blood pressure machine. Ask your provider for a recommendation. You can get one of these at most pharmacies.     The American Heart Association recommends the following guidelines for home blood pressure monitoring:  · Don't smoke or drink coffee for 30 minutes before taking your blood pressure.  · Go to the bathroom before the test.  · Relax for 5 minutes before taking the measurement.  · Sit with your back supported (don't sit on a couch or soft chair); keep your feet on the floor uncrossed. Place your arm on a solid flat surface (like a table) with the upper part of the arm at heart level. Place the middle of  the cuff directly above the eye of the elbow. Check the monitor's instruction manual for an illustration.  · Take multiple readings. When you measure, take 2 to 3 readings one minute apart and record all of the results.  · Take your blood pressure at the same time every day, or as your healthcare provider recommends.  · Record the date, time, and blood pressure reading.  · Take the record with you to your next medical appointment. If your blood pressure monitor has a built-in memory, simply take the monitor with you to your next appointment.  · Call your provider if you have several high readings. Don't be frightened by a single high blood pressure reading, but if you get several high readings, check in with your healthcare provider.  · Note: When blood pressure reaches a systolic (top number) of 180 or higher OR diastolic (bottom number) of 110 or higher, seek emergency medical treatment.  Follow-up care  You will need to see your healthcare provider regularly. This is to check your blood pressure and to make changes to your medicines. Make a follow-up appointment as directed. Bring the record of your home blood pressure readings to the appointment.  When to seek medical advice  Call your healthcare provider right away if any of these occur:  · Blood pressure reaches a systolic (upper number) of 180 or higher OR a diastolic (bottom number) of 110 or higher  · Chest pain or shortness of breath  · Severe headache  · Throbbing or rushing sound in the ears  · Nosebleed  · Sudden severe pain in your belly (abdomen)  · Extreme drowsiness, confusion, or fainting  · Dizziness or spinning sensation (vertigo)  · Weakness of an arm or leg or one side of the face  · You have problems speaking or seeing   Date Last Reviewed: 12/1/2016  © 8762-8485 [employer]. [address] [place] [address], [place] [address]. All rights reserved. This information is not intended as a substitute for professional medical care. Always  follow your healthcare professional's instructions.        Stop Toprol, start Chlorthalidone, continue Losartan and Norvasc.      Limit sodium to 2000 mg a day    Call for any concerns

## 2019-04-17 ENCOUNTER — OFFICE VISIT (OUTPATIENT)
Dept: INTERNAL MEDICINE | Facility: CLINIC | Age: 59
End: 2019-04-17
Payer: COMMERCIAL

## 2019-04-17 VITALS
WEIGHT: 238.13 LBS | TEMPERATURE: 98 F | HEART RATE: 66 BPM | HEIGHT: 67 IN | DIASTOLIC BLOOD PRESSURE: 87 MMHG | SYSTOLIC BLOOD PRESSURE: 160 MMHG | BODY MASS INDEX: 37.37 KG/M2 | OXYGEN SATURATION: 96 %

## 2019-04-17 DIAGNOSIS — I10 ESSENTIAL HYPERTENSION: Chronic | ICD-10-CM

## 2019-04-17 PROCEDURE — 3008F BODY MASS INDEX DOCD: CPT | Mod: CPTII,S$GLB,, | Performed by: NURSE PRACTITIONER

## 2019-04-17 PROCEDURE — 3079F DIAST BP 80-89 MM HG: CPT | Mod: CPTII,S$GLB,, | Performed by: NURSE PRACTITIONER

## 2019-04-17 PROCEDURE — 99999 PR PBB SHADOW E&M-EST. PATIENT-LVL V: ICD-10-PCS | Mod: PBBFAC,,, | Performed by: NURSE PRACTITIONER

## 2019-04-17 PROCEDURE — 99213 PR OFFICE/OUTPT VISIT, EST, LEVL III, 20-29 MIN: ICD-10-PCS | Mod: S$GLB,,, | Performed by: NURSE PRACTITIONER

## 2019-04-17 PROCEDURE — 99213 OFFICE O/P EST LOW 20 MIN: CPT | Mod: S$GLB,,, | Performed by: NURSE PRACTITIONER

## 2019-04-17 PROCEDURE — 3077F SYST BP >= 140 MM HG: CPT | Mod: CPTII,S$GLB,, | Performed by: NURSE PRACTITIONER

## 2019-04-17 PROCEDURE — 99999 PR PBB SHADOW E&M-EST. PATIENT-LVL V: CPT | Mod: PBBFAC,,, | Performed by: NURSE PRACTITIONER

## 2019-04-17 PROCEDURE — 3079F PR MOST RECENT DIASTOLIC BLOOD PRESSURE 80-89 MM HG: ICD-10-PCS | Mod: CPTII,S$GLB,, | Performed by: NURSE PRACTITIONER

## 2019-04-17 PROCEDURE — 3077F PR MOST RECENT SYSTOLIC BLOOD PRESSURE >= 140 MM HG: ICD-10-PCS | Mod: CPTII,S$GLB,, | Performed by: NURSE PRACTITIONER

## 2019-04-17 PROCEDURE — 3008F PR BODY MASS INDEX (BMI) DOCUMENTED: ICD-10-PCS | Mod: CPTII,S$GLB,, | Performed by: NURSE PRACTITIONER

## 2019-04-17 RX ORDER — CHLORTHALIDONE 50 MG/1
50 TABLET ORAL DAILY
Qty: 30 TABLET | Refills: 11 | Status: SHIPPED | OUTPATIENT
Start: 2019-04-17 | End: 2020-05-07 | Stop reason: SDUPTHER

## 2019-04-17 NOTE — MEDICAL/APP STUDENT
Subjective:       Patient ID: Christiano Dahl is a 58 y.o. male.    Chief Complaint: Blood Pressure Check  I have reviewed the notes, assessments, and/or procedures performed by [employer] student monty jacinto, I concur with her/his documentation of Christiano Dahl.  Patient presents to clinic today for blood pressure follow up. Pt states he has been taking his medicines as prescribed and his blood pressures have been still running high at home as well. Reports that he eat frozen vegetables and cooks with sea salt. He denies eating frozen meals, pre-packaged foods, fast food and canned foods high in sodium. States he drinks 2-3 bottles of water per day. Denies chest pain, shortness of breath, leg swelling, palpitations, blurry vision, double vision or floaters.     Review of Systems   Constitutional: Negative.    HENT: Negative.    Respiratory: Negative for cough, chest tightness, shortness of breath and wheezing.    Cardiovascular: Negative for chest pain, palpitations and leg swelling.   Gastrointestinal: Negative for diarrhea, nausea and vomiting.   Endocrine: Negative for polydipsia, polyphagia and polyuria.   Musculoskeletal: Negative.    Skin: Negative.    Neurological: Negative for dizziness, weakness, light-headedness, numbness and headaches.       Objective:      Physical Exam   Constitutional: He is oriented to person, place, and time. He appears well-developed and well-nourished. No distress.   HENT:   Head: Normocephalic and atraumatic.   Right Ear: External ear normal.   Left Ear: External ear normal.   Nose: Nose normal.   Mouth/Throat: Uvula is midline and oropharynx is clear and moist. No oropharyngeal exudate.   Neck: Neck supple.   Cardiovascular: Normal rate, regular rhythm, normal heart sounds and intact distal pulses.   No murmur heard.  Pulses:       Radial pulses are 2+ on the right side, and 2+ on the left side.   Pulmonary/Chest: No respiratory distress. He has wheezes in the right upper field  and the left upper field.   Musculoskeletal: Normal range of motion. He exhibits no edema, tenderness or deformity.   Neurological: He is alert and oriented to person, place, and time.   Skin: Skin is warm and dry. Capillary refill takes less than 2 seconds. No rash noted. He is not diaphoretic. No erythema. No pallor.   Psychiatric: He has a normal mood and affect. His speech is normal and behavior is normal. Judgment and thought content normal. Cognition and memory are normal.       Assessment:       1. Essential hypertension        Plan:     Christiano was seen today for blood pressure check.    Diagnoses and all orders for this visit:    Essential hypertension  -     chlorthalidone (HYGROTEN) 50 MG Tab; Take 1 tablet (50 mg total) by mouth once daily.      Patient Instructions     Low-Salt Choices  Eating salt (sodium) can make your body retain too much water. Excess water makes your heart work harder. Canned, packaged, and frozen foods are easy to prepare, but they are often high in sodium. Here are some ideas for low-salt foods you can easily prepare yourself.    For breakfast  · Fruit or 100% fruit juice  · Whole-wheat bread or an English muffin. Compare sodium content on labels.  · Low-fat milk or yogurt  · Unsalted eggs  · Shredded wheat  · Corn tortillas  · Unsalted steamed rice  · Regular (not instant) hot cereal, made without salt  Stay away from:  · Sausage, ziegler, and ham  · Flour tortillas  · Packaged muffins, pancakes, and biscuits  · Instant hot cereals  · Cottage cheese  For lunch and dinner  · Fresh fish, chicken, turkey, or meat--baked, broiled, or roasted without salt  · Dry beans, cooked without salt  · Tofu, stir-fried without salt  · Unsalted fresh fruit and vegetables, or frozen or canned fruit and vegetables with no added salt  Stay away from:  · Lunch or deli meat that is cured or smoked  · Cheese  · Tomato juice and catsup  · Canned vegetables, soups, and fish not labeled as no-salt-added or  reduced sodium  · Packaged gravies and sauces  · Olives, pickles, and relish  · Bottled salad dressings  For snacks and desserts  · Yogurt  · Unsalted, air popped popcorn  · Unsalted nuts or seeds  Stay away from:  · Pies and cakes  · Packaged dessert mixes  · Pizza  · Canned and packaged puddings  · Pretzels, chips, crackers, and nuts--unless the label says unsalted  Date Last Reviewed: 6/17/2015  © 2437-2716 [employer]. [address] [place] [address]. All rights reserved. This information is not intended as a substitute for professional medical care. Always follow your healthcare professional's instructions.      Increase chlorthalidone to 50 mg.  I sent in a new prescription but you can take 2 of what you have at home to use them up    Follow up 2-4 weeks to recheck your  Blood pressure

## 2019-04-17 NOTE — PROGRESS NOTES
Pt seen in conjunction with CELINE student please see additional notes  Discussed pt's salt intake with him also

## 2019-04-17 NOTE — PATIENT INSTRUCTIONS
Low-Salt Choices  Eating salt (sodium) can make your body retain too much water. Excess water makes your heart work harder. Canned, packaged, and frozen foods are easy to prepare, but they are often high in sodium. Here are some ideas for low-salt foods you can easily prepare yourself.    For breakfast  · Fruit or 100% fruit juice  · Whole-wheat bread or an English muffin. Compare sodium content on labels.  · Low-fat milk or yogurt  · Unsalted eggs  · Shredded wheat  · Corn tortillas  · Unsalted steamed rice  · Regular (not instant) hot cereal, made without salt  Stay away from:  · Sausage, ziegler, and ham  · Flour tortillas  · Packaged muffins, pancakes, and biscuits  · Instant hot cereals  · Cottage cheese  For lunch and dinner  · Fresh fish, chicken, turkey, or meat--baked, broiled, or roasted without salt  · Dry beans, cooked without salt  · Tofu, stir-fried without salt  · Unsalted fresh fruit and vegetables, or frozen or canned fruit and vegetables with no added salt  Stay away from:  · Lunch or deli meat that is cured or smoked  · Cheese  · Tomato juice and catsup  · Canned vegetables, soups, and fish not labeled as no-salt-added or reduced sodium  · Packaged gravies and sauces  · Olives, pickles, and relish  · Bottled salad dressings  For snacks and desserts  · Yogurt  · Unsalted, air popped popcorn  · Unsalted nuts or seeds  Stay away from:  · Pies and cakes  · Packaged dessert mixes  · Pizza  · Canned and packaged puddings  · Pretzels, chips, crackers, and nuts--unless the label says unsalted  Date Last Reviewed: 6/17/2015  © 6240-6332 [employer]. [address] [place] [address], [place] [address]. All rights reserved. This information is not intended as a substitute for professional medical care. Always follow your healthcare professional's instructions.      Increase chlorthalidone to 50 mg.  I sent in a new prescription but you can take 2 of what you have at home to use them up    Follow up 2-4  weeks to recheck your  Blood pressure

## 2019-05-09 ENCOUNTER — PATIENT OUTREACH (OUTPATIENT)
Dept: ADMINISTRATIVE | Facility: HOSPITAL | Age: 59
End: 2019-05-09

## 2019-05-09 NOTE — PROGRESS NOTES
Health Maintenance Due   Topic Date Due    Fecal Occult Blood Test (FOBT)/FitKit  1960    Pneumococcal Vaccine (Medium Risk) (1 of 1 - PPSV23) 12/25/1978    LDCT Lung Screen  12/25/2014     Pre-visit audit complete.

## 2019-05-23 ENCOUNTER — TELEPHONE (OUTPATIENT)
Dept: INTERNAL MEDICINE | Facility: CLINIC | Age: 59
End: 2019-05-23

## 2019-05-23 ENCOUNTER — OFFICE VISIT (OUTPATIENT)
Dept: INTERNAL MEDICINE | Facility: CLINIC | Age: 59
End: 2019-05-23
Payer: COMMERCIAL

## 2019-05-23 ENCOUNTER — IMMUNIZATION (OUTPATIENT)
Dept: PHARMACY | Facility: CLINIC | Age: 59
End: 2019-05-23
Payer: COMMERCIAL

## 2019-05-23 VITALS
DIASTOLIC BLOOD PRESSURE: 78 MMHG | HEART RATE: 58 BPM | WEIGHT: 230.63 LBS | OXYGEN SATURATION: 97 % | SYSTOLIC BLOOD PRESSURE: 134 MMHG | BODY MASS INDEX: 36.2 KG/M2 | HEIGHT: 67 IN

## 2019-05-23 DIAGNOSIS — L73.2 HIDRADENITIS SUPPURATIVA: ICD-10-CM

## 2019-05-23 DIAGNOSIS — E11.9 CONTROLLED TYPE 2 DIABETES MELLITUS WITHOUT COMPLICATION, WITHOUT LONG-TERM CURRENT USE OF INSULIN: ICD-10-CM

## 2019-05-23 DIAGNOSIS — N52.9 ERECTILE DYSFUNCTION, UNSPECIFIED ERECTILE DYSFUNCTION TYPE: ICD-10-CM

## 2019-05-23 DIAGNOSIS — E55.9 VITAMIN D DEFICIENCY: ICD-10-CM

## 2019-05-23 DIAGNOSIS — I10 ESSENTIAL HYPERTENSION: Primary | Chronic | ICD-10-CM

## 2019-05-23 DIAGNOSIS — E78.5 DYSLIPIDEMIA: ICD-10-CM

## 2019-05-23 PROCEDURE — 3075F SYST BP GE 130 - 139MM HG: CPT | Mod: CPTII,S$GLB,, | Performed by: INTERNAL MEDICINE

## 2019-05-23 PROCEDURE — 3078F PR MOST RECENT DIASTOLIC BLOOD PRESSURE < 80 MM HG: ICD-10-PCS | Mod: CPTII,S$GLB,, | Performed by: INTERNAL MEDICINE

## 2019-05-23 PROCEDURE — 99999 PR PBB SHADOW E&M-EST. PATIENT-LVL III: ICD-10-PCS | Mod: PBBFAC,,, | Performed by: INTERNAL MEDICINE

## 2019-05-23 PROCEDURE — 3008F BODY MASS INDEX DOCD: CPT | Mod: CPTII,S$GLB,, | Performed by: INTERNAL MEDICINE

## 2019-05-23 PROCEDURE — 3075F PR MOST RECENT SYSTOLIC BLOOD PRESS GE 130-139MM HG: ICD-10-PCS | Mod: CPTII,S$GLB,, | Performed by: INTERNAL MEDICINE

## 2019-05-23 PROCEDURE — 99999 PR PBB SHADOW E&M-EST. PATIENT-LVL III: CPT | Mod: PBBFAC,,, | Performed by: INTERNAL MEDICINE

## 2019-05-23 PROCEDURE — 3078F DIAST BP <80 MM HG: CPT | Mod: CPTII,S$GLB,, | Performed by: INTERNAL MEDICINE

## 2019-05-23 PROCEDURE — 99214 OFFICE O/P EST MOD 30 MIN: CPT | Mod: S$GLB,,, | Performed by: INTERNAL MEDICINE

## 2019-05-23 PROCEDURE — 99214 PR OFFICE/OUTPT VISIT, EST, LEVL IV, 30-39 MIN: ICD-10-PCS | Mod: S$GLB,,, | Performed by: INTERNAL MEDICINE

## 2019-05-23 PROCEDURE — 3044F HG A1C LEVEL LT 7.0%: CPT | Mod: CPTII,S$GLB,, | Performed by: INTERNAL MEDICINE

## 2019-05-23 PROCEDURE — 3008F PR BODY MASS INDEX (BMI) DOCUMENTED: ICD-10-PCS | Mod: CPTII,S$GLB,, | Performed by: INTERNAL MEDICINE

## 2019-05-23 PROCEDURE — 3044F PR MOST RECENT HEMOGLOBIN A1C LEVEL <7.0%: ICD-10-PCS | Mod: CPTII,S$GLB,, | Performed by: INTERNAL MEDICINE

## 2019-05-23 RX ORDER — SILDENAFIL CITRATE 20 MG/1
TABLET ORAL
Qty: 30 TABLET | Refills: 1 | Status: SHIPPED | OUTPATIENT
Start: 2019-05-23 | End: 2020-01-14

## 2019-05-23 RX ORDER — ERGOCALCIFEROL 1.25 MG/1
50000 CAPSULE ORAL
Qty: 12 CAPSULE | Refills: 1 | Status: SHIPPED | OUTPATIENT
Start: 2019-05-23 | End: 2019-08-22 | Stop reason: SDUPTHER

## 2019-05-23 NOTE — PATIENT INSTRUCTIONS
Start taking 1/2 tablet of the atorvastatin (cholesterol medication that helps prevent heart attack and stroke)    Continue taking the losartan 100mg and chlorthalidone 50mg daily.  For now, don't restart the amlodipine.    Try setting up a pillbox so that you can track whether you have taken your medications each day -- and so that you can put it in your bag to take to work if needed.     Definitely keep taking the vitamin D    Start back with 1/2 tablet of metformin daily AFTER a meal

## 2019-05-23 NOTE — PROGRESS NOTES
Subjective:       Patient ID: Christiano Dahl is a 58 y.o. male.    Chief Complaint: Follow-up    HPI  57 y/o man with HTN, HLD, DM2, hidradenitis suppurativa here for follow up for HTN    HTN - dx around 2012.   Meds - losartan 100mg, norvasc 10mg, chlorthalidone 50mg  Had been out of all BP meds for some time, then restarted last visit with me.  Previously also on metoprolol, this was stopped 3/2019; chlorthalidone started and uptitrated at NP visits.  Taking only the losartan 100mg (in the evening) and chlorthalidone 50mg (in the morning)  Doesn't recall why he stopped taking norvasc  Occasionally misses medication if he's running out the door to work.  Has been cutting back on cooking with salt. Uses frozen vegetables  Does eat/cook with sausage  Reports home BP is variable, but generally better.  Didn't bring in log today. Reports lowest /65, highest 160/80s in past few weeks.    HLD - new rx for statin given 2/2019, not taking     DM2 - dx around 2012, initially on metformin, then off, then restarted; off again for most of 2018. A1c 6.0 2/2019, not yet rechecked. New metformin rx given last visit; filled this but didn't start taking it.     Has been taking vitamin D    Review of Systems   Constitutional: Negative for activity change and unexpected weight change.   HENT: Negative.    Eyes: Negative for visual disturbance.   Respiratory: Negative for cough and shortness of breath.    Cardiovascular: Negative for chest pain and leg swelling.   Gastrointestinal: Negative for abdominal pain.   Endocrine: Negative.  Negative for polyuria.   Genitourinary: Negative.    Musculoskeletal: Negative for back pain and gait problem.   Skin: Negative.    Neurological: Negative for weakness and numbness.   Psychiatric/Behavioral: Negative.          Past medical history, surgical history, and family medical history reviewed and updated as appropriate.    Medications and allergies reviewed.     Objective:         "  Vitals:    05/23/19 1459   BP: 134/78   Pulse: (!) 58   SpO2: 97%   Weight: 104.6 kg (230 lb 9.6 oz)   Height: 5' 7" (1.702 m)     Body mass index is 36.12 kg/m².  Physical Exam   Constitutional: He is oriented to person, place, and time. He appears well-developed and well-nourished. No distress.   HENT:   Head: Normocephalic and atraumatic.   Nose: Nose normal.   Mouth/Throat: Oropharynx is clear and moist.   Eyes: Pupils are equal, round, and reactive to light. Conjunctivae and EOM are normal. No scleral icterus.   Neck: Neck supple.   Cardiovascular: Normal rate, regular rhythm and normal heart sounds.   No murmur heard.  Pulmonary/Chest: Effort normal and breath sounds normal. No respiratory distress.   Abdominal: Soft. Bowel sounds are normal. There is no tenderness.   Musculoskeletal: He exhibits no edema or tenderness.   Neurological: He is alert and oriented to person, place, and time. He has normal reflexes. No cranial nerve deficit. Gait normal.   Skin: Skin is warm and dry. He is not diaphoretic.   Psychiatric: He has a normal mood and affect.   Nursing note and vitals reviewed.      Lab Results   Component Value Date    WBC 9.48 02/27/2019    HGB 15.2 02/27/2019    HCT 47.7 02/27/2019     02/27/2019    CHOL 137 02/27/2019    TRIG 113 02/27/2019    HDL 31 (L) 02/27/2019    ALT 26 02/27/2019    AST 17 02/27/2019     02/27/2019    K 4.1 02/27/2019     02/27/2019    CREATININE 0.9 02/27/2019    BUN 14 02/27/2019    CO2 23 02/27/2019    TSH 1.417 02/27/2019    PSA 0.48 11/23/2016    HGBA1C 6.0 (H) 02/27/2019       Assessment:       1. Essential hypertension    2. Vitamin D deficiency    3. Erectile dysfunction, unspecified erectile dysfunction type    4. Dyslipidemia    5. Hidradenitis suppurativa    6. Controlled type 2 diabetes mellitus without complication, without long-term current use of insulin        Plan:   Christiano was seen today for follow-up.    Diagnoses and all orders for this " visit:    Essential hypertension - continue losartan, chlorthalidone only. norvasc taken off list as he is not taking and BP is near-goal. Counseled re: changes to lower sodium in diet further; has made some good changes so far  -     Basic metabolic panel; Future  BP log sheets given  F/u with NP in 6 weeks for BP    Vitamin D deficiency -   -     ergocalciferol (ERGOCALCIFEROL) 50,000 unit Cap; Take 1 capsule (50,000 Units total) by mouth every 7 days. For vitamin D deficiency    Erectile dysfunction, unspecified erectile dysfunction type - hadn't been able to afford sildenafil before; sending in new rx that will hopefully be affordable  -     sildenafil (REVATIO) 20 mg Tab; Take 2 or 3 tablets by mouth as needed for erectile dysfunction    Dyslipidemia - counseled to at least try taking 1/2 tablet of the statin    Hidradenitis suppurativa - no acute issues    Controlled type 2 diabetes mellitus without complication, without long-term current use of insulin - difficulty tolerating metformin; recommended try starting 1/2 tablet daily AFTER a meal    Counseled re: med adherence, recommended using pillbox    Health maintenance reviewed with patient.   Lab today  Pneumovax 23 today  FIT kit given  Follow up in about 3 months (around 8/23/2019) for hypertension, diabetes.    Nicolas Adams MD  Internal Medicine  [facility]  5/23/2019

## 2019-06-07 DIAGNOSIS — E11.9 TYPE 2 DIABETES MELLITUS WITHOUT COMPLICATION, UNSPECIFIED WHETHER LONG TERM INSULIN USE: ICD-10-CM

## 2019-07-01 ENCOUNTER — OFFICE VISIT (OUTPATIENT)
Dept: INTERNAL MEDICINE | Facility: CLINIC | Age: 59
End: 2019-07-01
Payer: COMMERCIAL

## 2019-07-01 VITALS
DIASTOLIC BLOOD PRESSURE: 70 MMHG | BODY MASS INDEX: 36.41 KG/M2 | SYSTOLIC BLOOD PRESSURE: 122 MMHG | HEIGHT: 67 IN | HEART RATE: 69 BPM | WEIGHT: 232 LBS | OXYGEN SATURATION: 97 % | TEMPERATURE: 99 F

## 2019-07-01 DIAGNOSIS — Z72.0 SMOKING TRYING TO QUIT: ICD-10-CM

## 2019-07-01 DIAGNOSIS — I10 ESSENTIAL HYPERTENSION: Primary | Chronic | ICD-10-CM

## 2019-07-01 PROCEDURE — 3008F BODY MASS INDEX DOCD: CPT | Mod: CPTII,S$GLB,, | Performed by: PHYSICIAN ASSISTANT

## 2019-07-01 PROCEDURE — 99999 PR PBB SHADOW E&M-EST. PATIENT-LVL IV: ICD-10-PCS | Mod: PBBFAC,,, | Performed by: PHYSICIAN ASSISTANT

## 2019-07-01 PROCEDURE — 3074F PR MOST RECENT SYSTOLIC BLOOD PRESSURE < 130 MM HG: ICD-10-PCS | Mod: CPTII,S$GLB,, | Performed by: PHYSICIAN ASSISTANT

## 2019-07-01 PROCEDURE — 3078F DIAST BP <80 MM HG: CPT | Mod: CPTII,S$GLB,, | Performed by: PHYSICIAN ASSISTANT

## 2019-07-01 PROCEDURE — 99999 PR PBB SHADOW E&M-EST. PATIENT-LVL IV: CPT | Mod: PBBFAC,,, | Performed by: PHYSICIAN ASSISTANT

## 2019-07-01 PROCEDURE — 99213 PR OFFICE/OUTPT VISIT, EST, LEVL III, 20-29 MIN: ICD-10-PCS | Mod: S$GLB,,, | Performed by: PHYSICIAN ASSISTANT

## 2019-07-01 PROCEDURE — 3078F PR MOST RECENT DIASTOLIC BLOOD PRESSURE < 80 MM HG: ICD-10-PCS | Mod: CPTII,S$GLB,, | Performed by: PHYSICIAN ASSISTANT

## 2019-07-01 PROCEDURE — 99213 OFFICE O/P EST LOW 20 MIN: CPT | Mod: S$GLB,,, | Performed by: PHYSICIAN ASSISTANT

## 2019-07-01 PROCEDURE — 3074F SYST BP LT 130 MM HG: CPT | Mod: CPTII,S$GLB,, | Performed by: PHYSICIAN ASSISTANT

## 2019-07-01 PROCEDURE — 3008F PR BODY MASS INDEX (BMI) DOCUMENTED: ICD-10-PCS | Mod: CPTII,S$GLB,, | Performed by: PHYSICIAN ASSISTANT

## 2019-07-01 NOTE — PATIENT INSTRUCTIONS
YOUR BLOOD PRESSURE LOOKS GOOD. CONTINUE YOUR SAME MEDICATIONS.      REMEMBER TO REACH BACK OUT TO THE SMOKING CESSATION PROGRAM TO WORK ON QUITTING SMOKING.

## 2019-07-01 NOTE — PROGRESS NOTES
"Subjective:       Patient ID: Christiano Dahl is a 58 y.o. male.    Chief Complaint: Blood Pressure Check    HPI     Established pt of Nicolas Adams MD (new to me)    Here for BP check.     He is without acute complaints.     /70 today in clinic. Reports compliance with losartan 100mg and chlorthalidone 50mg. Monitors BP at home with values in 130s/70s. Did not bring BP log to clinic today. He denies cp, sob, ha, vision changes or edema.     Past Medical History:   Diagnosis Date    C. difficile colitis 2013    hospitalized for 9 days    Diabetes mellitus     Diverticulitis 2014    required hospitalization    Hypertension     Pneumonia 2014     Social History     Tobacco Use    Smoking status: Current Every Day Smoker     Packs/day: 1.00     Years: 30.00     Pack years: 30.00    Smokeless tobacco: Never Used    Tobacco comment: 30 years, has quit a few times in the past   Substance Use Topics    Alcohol use: Yes     Comment: soc    Drug use: Not on file     Comment: MJ daily     Review of patient's allergies indicates:  No Known Allergies      Review of Systems   Constitutional: Negative for chills, fever and unexpected weight change.   Eyes: Negative for visual disturbance.   Respiratory: Negative for cough and shortness of breath.    Cardiovascular: Negative for chest pain and leg swelling.   Gastrointestinal: Negative for abdominal pain, nausea and vomiting.   Skin: Negative for rash.   Neurological: Negative for weakness and headaches.       Objective: /70 (BP Location: Left arm, Patient Position: Sitting, BP Method: Large (Manual))   Pulse 69   Temp 98.9 °F (37.2 °C) (Oral)   Ht 5' 7" (1.702 m)   Wt 105.2 kg (232 lb)   SpO2 97%   BMI 36.34 kg/m²         Physical Exam   Constitutional: He appears well-developed and well-nourished. No distress.   HENT:   Head: Normocephalic and atraumatic.   Mouth/Throat: Oropharynx is clear and moist.   Cardiovascular: Normal rate and regular " rhythm. Exam reveals no friction rub.   No murmur heard.  No carotid bruit   Pulmonary/Chest: Effort normal and breath sounds normal. He has no wheezes. He has no rales.   Abdominal: Soft. Bowel sounds are normal. There is no tenderness.   Musculoskeletal: He exhibits no edema.   Lymphadenopathy:     He has no cervical adenopathy.   Neurological: He is alert.   Skin: Skin is warm and dry. Capillary refill takes less than 2 seconds. No rash noted.   Psychiatric: He has a normal mood and affect.   Vitals reviewed.      Assessment:       1. Essential hypertension    2. Smoking trying to quit        Plan:         Christiano was seen today for blood pressure check.    Diagnoses and all orders for this visit:    Essential hypertension  Well controlled  Continue losartan 100mg and chlorthalidone 50mg  Discussed BP goals      Smoking trying to quit  Encouraged smoking cessation  He plans to call and schedule appt with cessation program    Buffy Díaz PA-C    Patient Instructions   YOUR BLOOD PRESSURE LOOKS GOOD. CONTINUE YOUR SAME MEDICATIONS.      REMEMBER TO REACH BACK OUT TO THE SMOKING CESSATION PROGRAM TO WORK ON QUITTING SMOKING.

## 2019-08-14 ENCOUNTER — OFFICE VISIT (OUTPATIENT)
Dept: INTERNAL MEDICINE | Facility: CLINIC | Age: 59
End: 2019-08-14
Payer: COMMERCIAL

## 2019-08-14 VITALS
BODY MASS INDEX: 36.99 KG/M2 | SYSTOLIC BLOOD PRESSURE: 138 MMHG | WEIGHT: 235.69 LBS | DIASTOLIC BLOOD PRESSURE: 72 MMHG | OXYGEN SATURATION: 95 % | HEART RATE: 54 BPM | HEIGHT: 67 IN

## 2019-08-14 DIAGNOSIS — L73.2 HIDRADENITIS SUPPURATIVA: ICD-10-CM

## 2019-08-14 DIAGNOSIS — Z00.00 ANNUAL PHYSICAL EXAM: Primary | ICD-10-CM

## 2019-08-14 DIAGNOSIS — D64.9 MILD ANEMIA: ICD-10-CM

## 2019-08-14 DIAGNOSIS — E55.9 VITAMIN D DEFICIENCY: ICD-10-CM

## 2019-08-14 DIAGNOSIS — Z12.11 ENCOUNTER FOR SCREENING FOR MALIGNANT NEOPLASM OF COLON: ICD-10-CM

## 2019-08-14 DIAGNOSIS — Z72.0 SMOKING TRYING TO QUIT: ICD-10-CM

## 2019-08-14 DIAGNOSIS — E78.5 DYSLIPIDEMIA: ICD-10-CM

## 2019-08-14 DIAGNOSIS — E11.9 CONTROLLED TYPE 2 DIABETES MELLITUS WITHOUT COMPLICATION, WITHOUT LONG-TERM CURRENT USE OF INSULIN: ICD-10-CM

## 2019-08-14 DIAGNOSIS — Z12.2 ENCOUNTER FOR SCREENING FOR LUNG CANCER: ICD-10-CM

## 2019-08-14 DIAGNOSIS — I10 ESSENTIAL HYPERTENSION: ICD-10-CM

## 2019-08-14 PROCEDURE — 99396 PR PREVENTIVE VISIT,EST,40-64: ICD-10-PCS | Mod: S$GLB,,, | Performed by: INTERNAL MEDICINE

## 2019-08-14 PROCEDURE — 3044F HG A1C LEVEL LT 7.0%: CPT | Mod: CPTII,S$GLB,, | Performed by: INTERNAL MEDICINE

## 2019-08-14 PROCEDURE — 3075F PR MOST RECENT SYSTOLIC BLOOD PRESS GE 130-139MM HG: ICD-10-PCS | Mod: CPTII,S$GLB,, | Performed by: INTERNAL MEDICINE

## 2019-08-14 PROCEDURE — 3078F PR MOST RECENT DIASTOLIC BLOOD PRESSURE < 80 MM HG: ICD-10-PCS | Mod: CPTII,S$GLB,, | Performed by: INTERNAL MEDICINE

## 2019-08-14 PROCEDURE — 3044F PR MOST RECENT HEMOGLOBIN A1C LEVEL <7.0%: ICD-10-PCS | Mod: CPTII,S$GLB,, | Performed by: INTERNAL MEDICINE

## 2019-08-14 PROCEDURE — 99999 PR PBB SHADOW E&M-EST. PATIENT-LVL V: ICD-10-PCS | Mod: PBBFAC,,, | Performed by: INTERNAL MEDICINE

## 2019-08-14 PROCEDURE — 3078F DIAST BP <80 MM HG: CPT | Mod: CPTII,S$GLB,, | Performed by: INTERNAL MEDICINE

## 2019-08-14 PROCEDURE — 3075F SYST BP GE 130 - 139MM HG: CPT | Mod: CPTII,S$GLB,, | Performed by: INTERNAL MEDICINE

## 2019-08-14 PROCEDURE — 99396 PREV VISIT EST AGE 40-64: CPT | Mod: S$GLB,,, | Performed by: INTERNAL MEDICINE

## 2019-08-14 PROCEDURE — 99999 PR PBB SHADOW E&M-EST. PATIENT-LVL V: CPT | Mod: PBBFAC,,, | Performed by: INTERNAL MEDICINE

## 2019-08-14 RX ORDER — CLINDAMYCIN PHOSPHATE 10 UG/ML
LOTION TOPICAL 2 TIMES DAILY
Qty: 60 ML | Refills: 1 | Status: SHIPPED | OUTPATIENT
Start: 2019-08-14 | End: 2023-08-18

## 2019-08-14 NOTE — PROGRESS NOTES
Subjective:       Patient ID: Christiano Dahl is a 58 y.o. male.    Chief Complaint: Follow-up (3 month)    HPI  59 y/o man with HTN, HLD, DM2, hidradenitis suppurativa here for follow up, annual exam.    HTN - dx around 2012.   Meds - losartan 100mg, chlorthalidone 50mg  Has previously been on metoprolol and norvasc; these were stopped  Some issues with med adherence but doing better  Following home BP, reports -130s    HLD - recommended to restart at atorvastatin; he is taking 20mg, does sometimes feel tired or lightheaded so takes this before sleeping    DM2 - dx around 2012, initially on metformin, then off, then restarted; off again for most of 2018. A1c 6.0 2/2019  Recommended to restart 1/2 tab metformin once a day after a meal  He did not restart the metformin at all; has been checking his BG and reports this was around 100-105. 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  Taking B-complex vitamin sometimes    Hidradenitis suppurativa - reports persistent drainage from some areas, but no pain or swelling.  Last saw Dermatology 7/2017 - Dr Win at [facility], prescribed rifampin + clindamycin at that visit    Smoking - multiple quit attempts, not currently. Avg about 1ppd x 30 yrs - meets criteria for screening low-dose chest CT    Review of Systems   Constitutional: Negative for activity change and unexpected weight change.   HENT: Negative.    Eyes: Negative for visual disturbance.   Respiratory: Negative for cough and shortness of breath.    Cardiovascular: Negative for chest pain and leg swelling.   Gastrointestinal: Negative for abdominal pain, blood in stool, constipation and diarrhea.   Endocrine: Negative.  Negative for polyuria.   Genitourinary: Negative.    Musculoskeletal:  "Negative for back pain and gait problem.   Skin:        As noted   Neurological: Negative for weakness and numbness.   Psychiatric/Behavioral: Negative.  Negative for dysphoric mood.         Past medical history, surgical history, and family medical history reviewed and updated as appropriate.    Medications and allergies reviewed.     Objective:          Vitals:    08/14/19 1643   BP: 138/72   Pulse: (!) 54   SpO2: 95%   Weight: 106.9 kg (235 lb 10.8 oz)   Height: 5' 7" (1.702 m)     Body mass index is 36.91 kg/m².  Physical Exam   Constitutional: He is oriented to person, place, and time. He appears well-developed and well-nourished. No distress.   HENT:   Head: Normocephalic and atraumatic.   Mouth/Throat: Oropharynx is clear and moist.   Eyes: Pupils are equal, round, and reactive to light. Conjunctivae and EOM are normal. No scleral icterus.   Neck: Neck supple. No thyromegaly present.   Cardiovascular: Normal rate, regular rhythm and normal heart sounds.   No murmur heard.  Pulmonary/Chest: Effort normal and breath sounds normal. No respiratory distress.   Abdominal: Soft. Bowel sounds are normal. He exhibits no distension. There is no tenderness.   Musculoskeletal: He exhibits no edema or tenderness.   Lymphadenopathy:     He has no cervical adenopathy.   Neurological: He is alert and oriented to person, place, and time. No cranial nerve deficit.   Skin: Skin is warm and dry.   Psychiatric: He has a normal mood and affect. His behavior is normal.   Vitals reviewed.      Lab Results   Component Value Date    WBC 9.48 02/27/2019    HGB 15.2 02/27/2019    HCT 47.7 02/27/2019     02/27/2019    CHOL 137 02/27/2019    TRIG 113 02/27/2019    HDL 31 (L) 02/27/2019    ALT 26 02/27/2019    AST 17 02/27/2019     05/23/2019    K 3.8 05/23/2019     05/23/2019    CREATININE 1.1 05/23/2019    BUN 23 (H) 05/23/2019    CO2 29 05/23/2019    TSH 1.417 02/27/2019    PSA 0.48 11/23/2016    HGBA1C 6.0 (H) " 02/27/2019       Assessment:       1. Annual physical exam    2. Controlled type 2 diabetes mellitus without complication, without long-term current use of insulin    3. Vitamin D deficiency    4. Hidradenitis suppurativa    5. Essential hypertension    6. Dyslipidemia    7. Encounter for screening for malignant neoplasm of colon    8. Encounter for screening for lung cancer    9. Smoking trying to quit        Plan:   Christiano was seen today for follow-up.    Diagnoses and all orders for this visit:    Annual physical exam - Overall doing well. Reviewed chronic and preventive health concerns.  Encouraged working on exercise, set daily goals of 10-15 min on workdays, 30 min on days off  -     Lipid panel; Future    Controlled type 2 diabetes mellitus without complication, without long-term current use of insulin - home checks within goal range  Due for labs, eye exam, foot exam - wants to see podiatry for this  -     Ambulatory Referral to Podiatry  -     CBC Without Differential; Future  -     Comprehensive metabolic panel; Future  -     Hemoglobin A1c; Future    Vitamin D deficiency - continue high-dose supplement  -     Vitamin D; Future    Hidradenitis suppurativa - discussed briefly as this has been stable; follow up with dermatology  -     Ambulatory Referral to Dermatology  -     clindamycin (CLEOCIN T) 1 % lotion; Apply topically 2 (two) times daily. For hidradenitis  -     CBC Without Differential; Future    Essential hypertension - at/near goal, continue current medications    Dyslipidemia - continue statin; checking lipids    Encounter for screening for malignant neoplasm of colon  -     Fecal Immunochemical Test (iFOBT); Future    Encounter for screening for lung cancer - discussed guidelines re: smoking history and screening for lung cancer. He will check into cost/copay for this  -     CT Chest Lung Screening Low Dose; Future    Smoking trying to quit     Health maintenance reviewed with patient.    Recommended shingrix vaccine, flu vaccine when available  Fasting labs next week  Follow up in about 6 months (around 2/14/2020) for diabetes, hypertension.    Nicolas Adams MD  Internal Medicine  [facility]  8/14/2019

## 2019-08-14 NOTE — PATIENT INSTRUCTIONS
On days that you are working:   10-15 minutes of cardio exercise  On days that you are not workin minutes of cardio exercise (walking, going to pool for walking-in-water or water aerobics/etc)    Call in to schedule your diabetes eye exam -- you need to do this yearly.    Call in to schedule your screening lung CT scan; ask when you are scheduling what the co-pay is and make sure your insurance covers this.    Also schedule with dermatology for the hidradenitis and with podiatry (foot specialist) for your diabetic foot exam and to help take care of your toenails - Dr Ha, Dr Graham, Dr Vaca    There is a new shingles vaccine available (Shingrix) that is both safer and more effective than the old shingles vaccine (Zostavax). I do recommend that you get this, as it can help prevent a shingles outbreak even if you have had one in the past.   For this vaccine, you will need a series of 2 shots, first one and then a second 2 to 6 months later.  Please check in with the [facility] or with your local pharmacy about getting this vaccine; they should be able to check on insurance coverage and whether there is any cost to you.

## 2019-08-22 ENCOUNTER — LAB VISIT (OUTPATIENT)
Dept: LAB | Facility: HOSPITAL | Age: 59
End: 2019-08-22
Attending: INTERNAL MEDICINE
Payer: COMMERCIAL

## 2019-08-22 DIAGNOSIS — E11.9 CONTROLLED TYPE 2 DIABETES MELLITUS WITHOUT COMPLICATION, WITHOUT LONG-TERM CURRENT USE OF INSULIN: ICD-10-CM

## 2019-08-22 DIAGNOSIS — L73.2 HIDRADENITIS SUPPURATIVA: ICD-10-CM

## 2019-08-22 DIAGNOSIS — Z00.00 ANNUAL PHYSICAL EXAM: ICD-10-CM

## 2019-08-22 DIAGNOSIS — E55.9 VITAMIN D DEFICIENCY: ICD-10-CM

## 2019-08-22 LAB
25(OH)D3+25(OH)D2 SERPL-MCNC: 26 NG/ML (ref 30–96)
ALBUMIN SERPL BCP-MCNC: 3.3 G/DL (ref 3.5–5.2)
ALP SERPL-CCNC: 119 U/L (ref 55–135)
ALT SERPL W/O P-5'-P-CCNC: 16 U/L (ref 10–44)
ANION GAP SERPL CALC-SCNC: 10 MMOL/L (ref 8–16)
AST SERPL-CCNC: 10 U/L (ref 10–40)
BILIRUB SERPL-MCNC: 0.4 MG/DL (ref 0.1–1)
BUN SERPL-MCNC: 21 MG/DL (ref 6–20)
CALCIUM SERPL-MCNC: 8.7 MG/DL (ref 8.7–10.5)
CHLORIDE SERPL-SCNC: 102 MMOL/L (ref 95–110)
CHOLEST SERPL-MCNC: 123 MG/DL (ref 120–199)
CHOLEST/HDLC SERPL: 4.1 {RATIO} (ref 2–5)
CO2 SERPL-SCNC: 27 MMOL/L (ref 23–29)
CREAT SERPL-MCNC: 1.3 MG/DL (ref 0.5–1.4)
ERYTHROCYTE [DISTWIDTH] IN BLOOD BY AUTOMATED COUNT: 14.4 % (ref 11.5–14.5)
EST. GFR  (AFRICAN AMERICAN): >60 ML/MIN/1.73 M^2
EST. GFR  (NON AFRICAN AMERICAN): 60 ML/MIN/1.73 M^2
ESTIMATED AVG GLUCOSE: 131 MG/DL (ref 68–131)
GLUCOSE SERPL-MCNC: 132 MG/DL (ref 70–110)
HBA1C MFR BLD HPLC: 6.2 % (ref 4–5.6)
HCT VFR BLD AUTO: 40.3 % (ref 40–54)
HDLC SERPL-MCNC: 30 MG/DL (ref 40–75)
HDLC SERPL: 24.4 % (ref 20–50)
HGB BLD-MCNC: 13.4 G/DL (ref 14–18)
LDLC SERPL CALC-MCNC: 73.8 MG/DL (ref 63–159)
MCH RBC QN AUTO: 29.9 PG (ref 27–31)
MCHC RBC AUTO-ENTMCNC: 33.3 G/DL (ref 32–36)
MCV RBC AUTO: 90 FL (ref 82–98)
NONHDLC SERPL-MCNC: 93 MG/DL
PLATELET # BLD AUTO: 276 K/UL (ref 150–350)
PMV BLD AUTO: 9.1 FL (ref 9.2–12.9)
POTASSIUM SERPL-SCNC: 3.7 MMOL/L (ref 3.5–5.1)
PROT SERPL-MCNC: 7.9 G/DL (ref 6–8.4)
RBC # BLD AUTO: 4.48 M/UL (ref 4.6–6.2)
SODIUM SERPL-SCNC: 139 MMOL/L (ref 136–145)
TRIGL SERPL-MCNC: 96 MG/DL (ref 30–150)
WBC # BLD AUTO: 12.54 K/UL (ref 3.9–12.7)

## 2019-08-22 PROCEDURE — 36415 COLL VENOUS BLD VENIPUNCTURE: CPT

## 2019-08-22 PROCEDURE — 80061 LIPID PANEL: CPT

## 2019-08-22 PROCEDURE — 80053 COMPREHEN METABOLIC PANEL: CPT

## 2019-08-22 PROCEDURE — 85027 COMPLETE CBC AUTOMATED: CPT

## 2019-08-22 PROCEDURE — 82306 VITAMIN D 25 HYDROXY: CPT

## 2019-08-22 PROCEDURE — 83036 HEMOGLOBIN GLYCOSYLATED A1C: CPT

## 2019-08-22 RX ORDER — ERGOCALCIFEROL 1.25 MG/1
50000 CAPSULE ORAL
Qty: 12 CAPSULE | Refills: 1 | Status: SHIPPED | OUTPATIENT
Start: 2019-08-22 | End: 2023-08-18

## 2019-08-23 ENCOUNTER — TELEPHONE (OUTPATIENT)
Dept: INTERNAL MEDICINE | Facility: CLINIC | Age: 59
End: 2019-08-23

## 2019-08-23 NOTE — PROGRESS NOTES
Please call patient - results released via portal but he does not check regularly.  Vitamin D improved but still low - recommend continue weekly high-dose supplement and add OTC 2000IU daily.  A1c still at goal  CMP overall ok but shows some decrease in kidney function - would recommend making sure to keep well-hydrated every day. Will recheck before next visit in 6 months.  Mild anemia, not worrisome, but will also recheck in 6 months. Should make sure to complete FIT screening.   Please schedule / put in recall for labs in 6 months before next visit.

## 2019-08-23 NOTE — TELEPHONE ENCOUNTER
----- Message from Nicolas Adams MD sent at 8/22/2019 11:55 PM CDT -----  Please call patient - results released via portal but he does not check regularly.  Vitamin D improved but still low - recommend continue weekly high-dose supplement and add OTC 2000IU daily.  A1c still at goal  CMP overall ok but shows some decrease in kidney function - would recommend making sure to keep well-hydrated every day. Will recheck before next visit in 6 months.  Mild anemia, not worrisome, but will also recheck in 6 months. Should make sure to complete FIT screening.   Please schedule / put in recall for labs in 6 months before next visit.

## 2019-09-13 ENCOUNTER — LAB VISIT (OUTPATIENT)
Dept: LAB | Facility: HOSPITAL | Age: 59
End: 2019-09-13
Attending: INTERNAL MEDICINE
Payer: COMMERCIAL

## 2019-09-13 DIAGNOSIS — Z12.11 ENCOUNTER FOR SCREENING FOR MALIGNANT NEOPLASM OF COLON: ICD-10-CM

## 2019-09-13 PROCEDURE — 82274 ASSAY TEST FOR BLOOD FECAL: CPT

## 2019-09-18 LAB — HEMOCCULT STL QL IA: NEGATIVE

## 2019-10-22 ENCOUNTER — HOSPITAL ENCOUNTER (EMERGENCY)
Facility: HOSPITAL | Age: 59
Discharge: HOME | End: 2019-10-22
Attending: EMERGENCY MEDICINE
Payer: COMMERCIAL

## 2019-10-22 VITALS
TEMPERATURE: 99 F | BODY MASS INDEX: 36.26 KG/M2 | HEART RATE: 60 BPM | DIASTOLIC BLOOD PRESSURE: 77 MMHG | WEIGHT: 231 LBS | HEIGHT: 67 IN | OXYGEN SATURATION: 96 % | RESPIRATION RATE: 18 BRPM | SYSTOLIC BLOOD PRESSURE: 157 MMHG

## 2019-10-22 DIAGNOSIS — L02.91 ABSCESS: Primary | ICD-10-CM

## 2019-10-22 LAB
BUN SERPL-MCNC: 19 MG/DL (ref 6–30)
CHLORIDE SERPL-SCNC: 102 MMOL/L (ref 95–110)
CREAT SERPL-MCNC: 1 MG/DL (ref 0.5–1.4)
CREAT SERPL-MCNC: 1 MG/DL (ref 0.5–1.4)
GLUCOSE SERPL-MCNC: 152 MG/DL (ref 70–110)
HCT VFR BLD CALC: 45 %PCV (ref 36–54)
POC IONIZED CALCIUM: 1.12 MMOL/L (ref 1.06–1.42)
POC TCO2 (MEASURED): 25 MMOL/L (ref 23–29)
POTASSIUM BLD-SCNC: 3.7 MMOL/L (ref 3.5–5.1)
SAMPLE: ABNORMAL
SAMPLE: NORMAL
SODIUM BLD-SCNC: 139 MMOL/L (ref 136–145)

## 2019-10-22 PROCEDURE — 10060 I&D ABSCESS SIMPLE/SINGLE: CPT

## 2019-10-22 PROCEDURE — 99285 EMERGENCY DEPT VISIT HI MDM: CPT | Mod: 25

## 2019-10-22 PROCEDURE — 25500020 PHARM REV CODE 255: Performed by: EMERGENCY MEDICINE

## 2019-10-22 PROCEDURE — 99284 PR EMERGENCY DEPT VISIT,LEVEL IV: ICD-10-PCS | Mod: 25,,, | Performed by: EMERGENCY MEDICINE

## 2019-10-22 PROCEDURE — 82565 ASSAY OF CREATININE: CPT

## 2019-10-22 PROCEDURE — 10060 I&D ABSCESS SIMPLE/SINGLE: CPT | Mod: ,,, | Performed by: EMERGENCY MEDICINE

## 2019-10-22 PROCEDURE — 25000003 PHARM REV CODE 250: Performed by: PHYSICIAN ASSISTANT

## 2019-10-22 PROCEDURE — 10060 PR DRAIN SKIN ABSCESS SIMPLE: ICD-10-PCS | Mod: ,,, | Performed by: EMERGENCY MEDICINE

## 2019-10-22 PROCEDURE — 99284 EMERGENCY DEPT VISIT MOD MDM: CPT | Mod: 25,,, | Performed by: EMERGENCY MEDICINE

## 2019-10-22 RX ORDER — LIDOCAINE HYDROCHLORIDE 10 MG/ML
10 INJECTION INFILTRATION; PERINEURAL
Status: COMPLETED | OUTPATIENT
Start: 2019-10-22 | End: 2019-10-22

## 2019-10-22 RX ORDER — SULFAMETHOXAZOLE AND TRIMETHOPRIM 800; 160 MG/1; MG/1
1 TABLET ORAL 2 TIMES DAILY
Qty: 14 TABLET | Refills: 0 | Status: SHIPPED | OUTPATIENT
Start: 2019-10-22 | End: 2019-10-29

## 2019-10-22 RX ADMIN — IOHEXOL 100 ML: 350 INJECTION, SOLUTION INTRAVENOUS at 01:10

## 2019-10-22 RX ADMIN — LIDOCAINE HYDROCHLORIDE 10 ML: 10 INJECTION, SOLUTION INFILTRATION; PERINEURAL at 02:10

## 2019-10-22 NOTE — ED PROVIDER NOTES
Encounter Date: 10/22/2019       History     Chief Complaint   Patient presents with    Abscess     under chin with swelling     Patient is a 58-year-old  male with a PMH of DM, diverticulitis, and hypertension presents to the ED for urgent evaluation of abscess.  Patient reports while shaving his face 2 days ago, he accidentally nicked in area below his chin.  He states yesterday he noticed the area became swollen and painful.  He denies purulent discharge. He states the swelling is causing mild difficulty with swallowing.  He denies dental pain or trauma. He denies fever/chills, headache, tongue swelling, throat swelling, chest pain, shortness of breath, nausea or vomiting.    The history is provided by the patient.     Review of patient's allergies indicates:  No Known Allergies  Past Medical History:   Diagnosis Date    C. difficile colitis 2013    hospitalized for 9 days    Diabetes mellitus     Diverticulitis 2014    required hospitalization    Hypertension     Pneumonia 2014     Past Surgical History:   Procedure Laterality Date    ABCESS DRAINAGE  03/2016    perineum     Family History   Problem Relation Age of Onset    Diabetes Father     Cancer Paternal Grandmother         lung (smoker)    Diabetes Paternal Grandfather     Diabetes Other     Heart disease Mother      Social History     Tobacco Use    Smoking status: Current Every Day Smoker     Packs/day: 1.00     Years: 30.00     Pack years: 30.00    Smokeless tobacco: Never Used    Tobacco comment: 30 years, has quit a few times in the past   Substance Use Topics    Alcohol use: Yes     Comment: soc    Drug use: Not on file     Comment: RA daily     Review of Systems   Constitutional: Negative for chills and fever.   HENT: Positive for trouble swallowing. Negative for facial swelling and sore throat.    Eyes: Negative for visual disturbance.   Respiratory: Negative for choking and shortness of breath.    Cardiovascular:  Negative for chest pain.   Gastrointestinal: Negative for nausea and vomiting.   Genitourinary: Negative for dysuria.   Musculoskeletal: Negative for back pain.   Skin: Positive for wound. Negative for rash.        Neck abscess   Neurological: Negative for weakness.   Hematological: Does not bruise/bleed easily.   Psychiatric/Behavioral: Negative for dysphoric mood.       Physical Exam     Initial Vitals [10/22/19 1048]   BP Pulse Resp Temp SpO2   (!) 168/88 75 18 99 °F (37.2 °C) 96 %      MAP       --         Physical Exam    Nursing note and vitals reviewed.  Constitutional: He appears well-developed and well-nourished. He is not diaphoretic. No distress.   HENT:   Head: Normocephalic and atraumatic.   Mouth/Throat: Oropharynx is clear and moist. No oropharyngeal exudate.   Focal area of swelling and erythema with central punctate lesion noted to right submandibular skin. Area is fluctuant and tender. Post-oropharynx clear with edema or erythema. Uvula midline. No swelling of the floor of the mouth. Poor dentition without evidence of focal collection of fluctuance or erythema.   Eyes: Conjunctivae and EOM are normal. Pupils are equal, round, and reactive to light.   Neck: Normal range of motion. Neck supple.   Cardiovascular: Normal rate, regular rhythm, normal heart sounds and intact distal pulses.   Pulmonary/Chest: Breath sounds normal. No respiratory distress. He has no wheezes. He has no rales.   Abdominal: Soft. Bowel sounds are normal. There is no tenderness. There is no rebound and no guarding.   Musculoskeletal: Normal range of motion. He exhibits no edema or tenderness.   Neurological: He is alert and oriented to person, place, and time. GCS score is 15. GCS eye subscore is 4. GCS verbal subscore is 5. GCS motor subscore is 6.   Skin: Skin is warm and dry.   Psychiatric: He has a normal mood and affect. Thought content normal.         ED Course   I & D - Incision and Drainage  Date/Time: 10/22/2019 3:51  PM  Performed by: Milady Quinones PA-C  Authorized by: Sandy Tenorio MD   Type: abscess  Body area: head/neck  Location details: neck  Anesthesia: local infiltration    Anesthesia:  Local Anesthetic: lidocaine 1% without epinephrine  Anesthetic total: 3 mL  Scalpel size: 11  Incision type: single straight  Complexity: simple  Drainage: purulent and  bloody  Drainage amount: scant  Wound treatment: incision,  wound left open,  deloculation and  expression of material  Patient tolerance: Patient tolerated the procedure well with no immediate complications        Labs Reviewed   ISTAT PROCEDURE - Abnormal; Notable for the following components:       Result Value    POC Glucose 152 (*)     All other components within normal limits   ISTAT CREATININE          Imaging Results          CT Soft Tissue Neck With Contrast (Final result)  Result time 10/22/19 13:39:52    Final result by Noel Henry MD (10/22/19 13:39:52)                 Impression:      No evidence of mass.  There is mild soft tissue thickening of the dermis and subcutaneous tissue in the region underlying the BB in the area of concern of the right submandibular region.  Findings may represent edema or cellulitis.  No evidence of mass.    Additional findings as described above.      Electronically signed by: Noel Henry MD  Date:    10/22/2019  Time:    13:39             Narrative:    EXAMINATION:  CT SOFT TISSUE NECK WITH CONTRAST    CLINICAL HISTORY:  Neck mass, nonpulsatile, solitary;Neck abscess;    TECHNIQUE:  Low dose axial images as well as sagittal and coronal reconstructions were performed from the skull base to the clavicles following the intravenous administration of 100 mL of Omnipaque 350.    COMPARISON:  None    FINDINGS:  Skull Base and Brain (limited evaluation): No significant abnormality.    Sinuses and Mastoid Air Cells: Essentially clear.    Salivary Glands: Parotid and submandibular glands are bilaterally symmetric and  demonstrate no gross abnormalities.  A few punctate calcifications are noted within the parotid glands bilaterally which may represent vascular calcifications versus 1-2 mm sialoliths.    Thyroid: Normal in size and configuration.    Cervical Lymph Nodes: No pathologic enlargement.    Pharynx/Larynx: Normal, with preservation of the normal anatomical fat planes.    Vasculature (limited evaluation): Vascular structures of the neck appear patent    Upper Airways and Lungs: Trachea is midline and the proximal airways are patent. Lung apices are clear.    Bones: No acute fracture or suspicious lytic or sclerotic lesion.    Miscellaneous: There is mild soft tissue thickening of the subcutaneous tissue adjacent to the BB along the right submandibular region.  Findings may be seen with cellulitis or edema.  No underlying mass is visualized.  There are a few nonenlarged lymph nodes in the submandibular region.                                 Medical Decision Making:   History:   Old Medical Records: I decided to obtain old medical records.  Clinical Tests:   Radiological Study: Reviewed and Ordered       APC / Resident Notes:   Pt presenting 2/2 abscess. Patient did not have surrounding cellulitis. No systemic complaints or vital signs to think patient is septic.  A CT soft tissue neck was obtained to rule out deep space infection.  Results revealed a mild soft tissue thickening of the dermis and subcu tissue in the right submandibular region, representative of edema or cellulitis.  Please see procedure note for I and D which patient tolerated. Patient dc home with Bactrim. No other complaints and Neurovascularly intact. He is speaking in clear and full sentences with no evidence of respiratory distress.    Cautious return precautions discussed with patient and/or family with understanding. Prompt f/u with primary care physician discussed. All questions answered. Patient comfortable with plan. I have discussed patient case  with my supervisory physician, who is in agreement with my assessment and plan.              Attending Attestation:     Physician Attestation Statement for NP/PA:   I have conducted a face to face encounter with this patient in addition to the NP/PA, due to Medical Complexity    Other NP/PA Attestation Additions:     Physical Exam: Large area of TTP, erythema and induration over submandibular skin with small fluctuant abscess appreciated. No stridor, drooling, phonation changes, or sublingual induration or dental abscesses appreciated.    Medical Decision Making: I discussed the patient's care with Advanced Practice Clinician, and did see this patient with the APC.  I reviewed their note and agree with the history, physical, assessment, diagnosis, treatment, and disposition plan provided by the Advanced Practice Clinician.                     Clinical Impression:       ICD-10-CM ICD-9-CM   1. Abscess L02.91 682.9         Disposition:   Disposition: Discharged  Condition: Stable                        Milady Quinones PA-C  10/22/19 1553       Sandy Tenorio MD  10/24/19 122

## 2019-10-22 NOTE — ED NOTES
Patient identifiers verified and correct for  Christiano Dahl   LOC: The patient is awake, alert and aware of environment with an appropriate affect, the patient is oriented x 3 and speaking appropriately.   APPEARANCE: Patient appears comfortable and in no acute distress, patient is clean and well groomed.  SKIN: The skin is warm and dry, color consistent with ethnicity, patient has normal skin turgor and moist mucus membranes, skin intact, no breakdown or bruising noted.  Pt has abscess to chin that has been there for less 24 hours. Pt has skin break down to right forearm that has been there for a week.   MUSCULOSKELETAL: Patient moving all extremities spontaneously, no swelling noted.  RESPIRATORY: Airway is open and patent, respirations are spontaneous, patient has a normal effort and rate, no accessory muscle use noted,   CARDIAC: Pt placed on cardiac monitor. Patient has a normal rate and regular rhythm, no edema noted, capillary refill < 3 seconds.   GASTRO: Soft and non tender to palpation, no distention noted, normoactive bowel sounds present in all four quadrants. Pt states bowel movements have been regular.  : Pt denies any pain or frequency with urination.  NEURO: Pt opens eyes spontaneously, behavior appropriate to situation, follows commands, facial expression symmetrical, bilateral hand grasp equal and even, purposeful motor response noted, normal sensation in all extremities when touched with a finger.

## 2019-10-22 NOTE — DISCHARGE INSTRUCTIONS
Begin Bactrim twice daily for 7 days for treatment of abscess.  You may take over-the-counter Tylenol or Motrin as needed as directed for pain relief.  Follow-up with your primary care physician in 3-5 days.  Return to the ED for any symptoms including fever or chills, spreading redness, increase in size of abscess, throat swelling, difficulty breathing, or any other concerning symptoms.    Our goal in the emergency department is to always give you outstanding care and exceptional service. You may receive a survey by mail or e-mail in the next week regarding your experience in our ED. We would greatly appreciate your completing and returning the survey. Your feedback provides us with a way to recognize our staff who give very good care and it helps us learn how to improve when your experience was below our aspiration of excellence.

## 2020-01-13 DIAGNOSIS — N52.9 ERECTILE DYSFUNCTION, UNSPECIFIED ERECTILE DYSFUNCTION TYPE: ICD-10-CM

## 2020-01-14 RX ORDER — SILDENAFIL CITRATE 20 MG/1
TABLET ORAL
Qty: 30 TABLET | Refills: 2 | Status: SHIPPED | OUTPATIENT
Start: 2020-01-14 | End: 2020-03-27

## 2020-01-14 NOTE — PROGRESS NOTES
Refill Authorization Note     is requesting a refill authorization.    Brief assessment and rationale for refill: APPROVE: prr  Name and strength of medication: sildenafil (REVATIO) 20 mg Tab       Medication Therapy Plan: BP on file in past 12 months, approve     Medication reconciliation completed: No              How patient will take medication: take 2 to 3 tablets prn ed          Comments:   Requested Prescriptions   Pending Prescriptions Disp Refills    sildenafil (REVATIO) 20 mg Tab [Pharmacy Med Name: SILDENAFIL CITRATE 20MG TABLET] 30 tablet 2     Sig: TAKE 2 to 3  TABLETS BY MOUTH as needed for erectile dysfunction       Urology: Erectile Dysfunction Agents Failed - 1/13/2020 12:22 AM        Failed - Last BP in normal range within 360 days     BP Readings from Last 3 Encounters:   10/22/19 (!) 157/77   08/14/19 138/72   07/01/19 122/70              Passed - Patient is at least 18 years old        Passed - Nitrates are not on the active medication list        Passed - Office visit in past 12 months or future 90 days     Recent Outpatient Visits            5 months ago Annual physical exam    [facility] - Internal Medicine Nicolas Adams MD    6 months ago Essential hypertension    [facility] - Internal Medicine Buffy Díaz PA-C    7 months ago Essential hypertension    [facility] Internal Medicine Nicolas Adams MD    9 months ago Essential hypertension    [facility] Internal Medicine MICHAEL Reynaga    10 months ago Essential hypertension    [facility] MICHAEL Reynaga                    Appointments  past 12m or future 3m with PCP    Date Provider   Last Visit   8/14/2019 Nicolas Adams MD   Next Visit   Visit date not found Nicolas Adams MD

## 2020-03-26 NOTE — PROGRESS NOTES
Refill Authorization Note     is requesting a refill authorization.    Brief assessment and rationale for refill: DEFER: dose adjustment      Medication-related problems identified: Dose adjustment    Medication Therapy Plan: Per pharmacy note, pt asking to increase to 100 mg from 60 mg; will queue; please review                              Comments:   Refill Center Care Gap Closure protocols temporarily suspended.   Requested Prescriptions   Pending Prescriptions Disp Refills    sildenafiL (VIAGRA) 100 MG tablet [Pharmacy Med Name: SILDENAFIL CITRATE 100MG TABLET] 30 tablet 0     Sig: TAKE ONE TABLET BY MOUTH 30 MINUTES PRIOR TO INTERCOURSE       Urology: Erectile Dysfunction Agents Failed - 3/26/2020  3:45 PM        Failed - Last BP in normal range within 360 days.     BP Readings from Last 3 Encounters:   10/22/19 (!) 157/77   08/14/19 138/72   07/01/19 122/70              Passed - Patient is at least 18 years old        Passed - Nitrates are not on the active medication list        Passed - Office visit in past 12 months or future 90 days.     Recent Outpatient Visits            7 months ago Annual physical exam    [facility] - Internal Medicine Nicolas Adams MD    8 months ago Essential hypertension    [facility] - Internal Medicine Buffy Díaz PA-C    10 months ago Essential hypertension    [facility] - Internal Medicine Nicolas Adams MD    11 months ago Essential hypertension    [facility] Internal Medicine MICHAEL Reynaga    1 year ago Essential hypertension    [facility] Internal Medicine MICHAEL Reynaga                     Appointments  past 12m or future 3m with PCP    Date Provider   Last Visit   8/14/2019 Nicolas Adams MD   Next Visit   Visit date not found Nicolas Adams MD   .  ED visits in past 90 days: 0       Note composed:3:47 PM 03/26/2020

## 2020-03-27 RX ORDER — SILDENAFIL 100 MG/1
TABLET, FILM COATED ORAL
Qty: 30 TABLET | Refills: 0 | Status: SHIPPED | OUTPATIENT
Start: 2020-03-27 | End: 2020-04-23

## 2020-04-23 NOTE — TELEPHONE ENCOUNTER
Automated refill request. Please clarify with patient whether he is actually requesting a refill on this med; it was sent in 3 weeks ago.

## 2020-04-23 NOTE — PROGRESS NOTES
Refill Routing Note    Medication(s) are not appropriate for processing by [facility]:       Medication requested has undergone a recent dosage adjustment (<3 months)     Name and strength of medication: sildenafiL (VIAGRA) 100 MG tablet     Medication Therapy Plan: dosage adjustment < 90 days, defer to PCP  Medication reconciliation completed: No      Appointments [id number] or future 3m with PCP    Date Provider   Last Visit   8/14/2019 Nicolas Adams MD   Next Visit   Visit date not found Nicolas Adams MD     Automatic Epic Protocol Generated Data:    Requested Prescriptions   Pending Prescriptions Disp Refills    sildenafiL (VIAGRA) 100 MG tablet [Pharmacy Med Name: SILDENAFIL CITRATE 100MG TABLET] 90 tablet 0     Sig: TAKE ONE TABLET BY MOUTH 30 MINUTES PRIOR TO INTERCOURSE       Urology: Erectile Dysfunction Agents Failed - 4/23/2020 11:32 AM        Failed - Last BP in normal range within 360 days.     BP Readings from Last 3 Encounters:   10/22/19 (!) 157/77   08/14/19 138/72   07/01/19 122/70              Passed - Patient is at least 18 years old        Passed - Nitrates are not on the active medication list        Passed - Office visit in past 12 months or future 90 days.     Recent Outpatient Visits            8 months ago Annual physical exam    [facility] Nicolas Adams MD    9 months ago Essential hypertension    [facility] - Internal Medicine Buffy Díaz PA-C    11 months ago Essential hypertension    [facility] - [facility] Nicolas Adams MD    1 year ago Essential hypertension    [facility] ROMAN ReynagaC    1 year ago Essential hypertension    [facility] MICHAEL Reynaga                       Note composed:11:37 AM 04/23/2020

## 2020-04-24 RX ORDER — SILDENAFIL 100 MG/1
TABLET, FILM COATED ORAL
Qty: 30 TABLET | Refills: 0 | Status: SHIPPED | OUTPATIENT
Start: 2020-04-24 | End: 2023-10-27

## 2020-05-07 DIAGNOSIS — I10 ESSENTIAL HYPERTENSION: Chronic | ICD-10-CM

## 2020-05-07 RX ORDER — CHLORTHALIDONE 50 MG/1
50 TABLET ORAL DAILY
Qty: 30 TABLET | Refills: 2 | Status: SHIPPED | OUTPATIENT
Start: 2020-05-07 | End: 2020-07-20

## 2020-05-07 RX ORDER — LOSARTAN POTASSIUM 100 MG/1
TABLET ORAL
Qty: 90 TABLET | Refills: 1 | Status: SHIPPED | OUTPATIENT
Start: 2020-05-07 | End: 2020-07-27 | Stop reason: SDUPTHER

## 2020-05-07 NOTE — TELEPHONE ENCOUNTER
----- Message from Veronika Argueta sent at 5/7/2020  3:14 PM CDT -----  Contact: self/[phone]  Requesting an RX refill or new RX.  Is this a refill or new RX:  Refill 1  RX name and strength: chlorthalidone (HYGROTEN) 50 MG Tab  Directions (copy/paste from chart):    Is this a 30 day or 90 day RX:    Local pharmacy or mail order pharmacy:  Local pharmacy  Pharmacy name and phone # (copy/paste from chart):  [id number] [address] [facility] [phone] (Phone)  [phone] (Fax)   Comments:      Requesting an RX refill or new RX.  Is this a refill or new RX:  Refill 2  RX name and strength: losartan (COZAAR) 100 MG tablet  Directions (copy/paste from chart):    Is this a 30 day or 90 day RX:    Local pharmacy or mail order pharmacy:  Local pharmacy  Pharmacy name and phone # (copy/paste from chart):  [id number] [address] [facility] [employer] [place] [phone] (Phone)  [phone] (Fax)   Comments:

## 2020-05-08 NOTE — TELEPHONE ENCOUNTER
REfills sent. PAtient overdue for follow up for HTN as well as for labs. Please contact to help schedule. OK for virtual visit for HTN follow up, but ideally should have labs done prior

## 2020-05-20 ENCOUNTER — OFFICE VISIT (OUTPATIENT)
Dept: URGENT CARE | Facility: CLINIC | Age: 60
End: 2020-05-20
Payer: COMMERCIAL

## 2020-05-20 VITALS
DIASTOLIC BLOOD PRESSURE: 83 MMHG | WEIGHT: 235 LBS | TEMPERATURE: 99 F | BODY MASS INDEX: 36.88 KG/M2 | HEART RATE: 82 BPM | HEIGHT: 67 IN | SYSTOLIC BLOOD PRESSURE: 140 MMHG | OXYGEN SATURATION: 97 %

## 2020-05-20 DIAGNOSIS — L02.01 ABSCESS, EYEBROW: Primary | ICD-10-CM

## 2020-05-20 DIAGNOSIS — L03.811 ABSCESS OR CELLULITIS OF SCALP: ICD-10-CM

## 2020-05-20 PROCEDURE — 67700 BLEPHAROTOMY DRG ABSC EYELID: CPT | Mod: S$GLB,,, | Performed by: EMERGENCY MEDICINE

## 2020-05-20 PROCEDURE — 99214 OFFICE O/P EST MOD 30 MIN: CPT | Mod: 25,S$GLB,, | Performed by: EMERGENCY MEDICINE

## 2020-05-20 PROCEDURE — 67700 INCISION & DRAINAGE: ICD-10-PCS | Mod: S$GLB,,, | Performed by: EMERGENCY MEDICINE

## 2020-05-20 PROCEDURE — 99214 PR OFFICE/OUTPT VISIT, EST, LEVL IV, 30-39 MIN: ICD-10-PCS | Mod: 25,S$GLB,, | Performed by: EMERGENCY MEDICINE

## 2020-05-20 RX ORDER — SULFAMETHOXAZOLE AND TRIMETHOPRIM 800; 160 MG/1; MG/1
1 TABLET ORAL 2 TIMES DAILY
Qty: 20 TABLET | Refills: 0 | Status: SHIPPED | OUTPATIENT
Start: 2020-05-20 | End: 2020-05-30

## 2020-05-20 RX ORDER — MUPIROCIN 20 MG/G
OINTMENT TOPICAL
Qty: 22 G | Refills: 1 | Status: SHIPPED | OUTPATIENT
Start: 2020-05-20 | End: 2023-08-18

## 2020-05-20 NOTE — PATIENT INSTRUCTIONS
AVOID PRIOR USED SHAMPOO  WASH WITH ANTIBACTERIAL SOAP AND WATER LIKE DIAL  BACTROBAN OINTMENT RX  BACTRIM DS RX  RETURN FOR ANY CONCERNS OR PROBLEMS      Abscess (Incision & Drainage)  An abscess is sometimes called a boil. It happens when bacteria get trapped under the skin and start to grow. Pus forms inside the abscess as the body responds to the bacteria. An abscess can happen with an insect bite, ingrown hair, blocked oil gland, pimple, cyst, or puncture wound.  Your healthcare provider has drained the pus from your abscess. If the abscess pocket was large, your healthcare provider may have put in gauze packing. Your provider will need to remove it on your next visit. He or she may also replace it at that time. You may not need antibiotics to treat a simple abscess, unless the infection is spreading into the skin around the wound (cellulitis).  The wound will take about 1 to 2 weeks to heal, depending on the size of the abscess. Healthy tissue will grow from the bottom and sides of the opening until it seals over.  Home care  These tips can help your wound heal:  · The wound may drain for the first 2 days. Cover the wound with a clean dry dressing. Change the dressing if it becomes soaked with blood or pus.  · If a gauze packing was placed inside the abscess pocket, you may be told to remove it yourself. You may do this in the shower. Once the packing is removed, you should wash the area in the shower, or clean the area as directed by your provider. Continue to do this until the skin opening has closed. Make sure you wash your hands after changing the packing or cleaning the wound.  · If you were prescribed antibiotics, take them as directed until they are all gone.  · You may use acetaminophen or ibuprofen to control pain, unless another pain medicine was prescribed. If you have liver disease or ever had a stomach ulcer, talk with your doctor before using these medicines.  Follow-up care  Follow up with your  healthcare provider, or as advised. If a gauze packing was put in your wound, it should be removed in 1 to 2 days. Check your wound every day for any signs that the infection is getting worse. The signs are listed below.  When to seek medical advice  Call your healthcare provider right away if any of these occur:  · Increasing redness or swelling  · Red streaks in the skin leading away from the wound  · Increasing local pain or swelling  · Continued pus draining from the wound 2 days after treatment  · Fever of 100.4ºF (38ºC) or higher, or as directed by your healthcare provider  · Boil returns when you are at home  Date Last Reviewed: 9/1/2016  © 7480-1193 [employer]. [address] [place] [address], [place] [id number]. All rights reserved. This information is not intended as a substitute for professional medical care. Always follow your healthcare professional's instructions.        Discharge Instructions for Cellulitis  You have been diagnosed with cellulitis. This is an infection in the deepest layer of the skin. In some cases, the infection also affects the muscle. Cellulitis is caused by bacteria. The bacteria can enter the body through broken skin. This can happen with a cut, scratch, animal bite, or an insect bite that has been scratched. You may have been treated in the hospital with antibiotics and fluids. You will likely be given a prescription for antibiotics to take at home. This sheet will help you take care of yourself at home.  Home care  When you are home:  · Take the prescribed antibiotic medicine you are given as directed until it is gone. Take it even if you feel better. It treats the infection and stops it from returning. Not taking all the medicine can make future infections hard to treat.  · Keep the infected area clean.  · When possible, raise the infected area above the level of your heart. This helps keep swelling down.  · Talk with your healthcare provider if you are in pain. Ask what  kind of over-the-counter medicine you can take for pain.  · Apply clean bandages as advised.  · Take your temperature once a day for a week.  · Wash your hands often to prevent spreading the infection.  In the future, wash your hands before and after you touch cuts, scratches, or bandages. This will help prevent infection.   When to call your healthcare provider  Call your healthcare provider immediately if you have any of the following:  · Difficulty or pain when moving the joints above or below the infected area  · Discharge or pus draining from the area  · Fever of 100.4°F (38°C) or higher, or as directed by your healthcare provider  · Pain that gets worse in or around the infected   · Redness that gets worse in or around the infected area, particularly if the area of redness expands to a wider area  · Shaking chills  · Swelling of the infected area  · Vomiting   Date Last Reviewed: 8/1/2016  © 5552-3201 [employer]. [address] [place] [address], [place] [address]. All rights reserved. This information is not intended as a substitute for professional medical care. Always follow your healthcare professional's instructions.

## 2020-05-20 NOTE — PROGRESS NOTES
"Subjective:       Patient ID: Christiano Dahl is a 59 y.o. male.    Vitals:  height is 5' 7" (1.702 m) and weight is 106.6 kg (235 lb). His temperature is 99.2 °F (37.3 °C). His blood pressure is 140/83 (abnormal) and his pulse is 82. His oxygen saturation is 97%.     Chief Complaint: Abscess    4 days of abscess above right eye.    REPORTS USING SPECIAL SHAMPOO AND HEN DEVELOPED AREAS OF SCALP INFECTION/BUMPS, AND THEN DEVELOPED RIGHT EYEBROW/SUPEROLATERAL EYELID PAIN/SWELLING/REDNESS CONSISTENT WITH ABSCESS. NO FEVERS, NO CHANGE IN VISION, NO PAIN ON EOM, NO HEADACHE, NO VESICLES, NO DERMATOMAL LESIONS, NO SHINGLES.    Abscess   Chronicity:  NewProgression Since Onset: rapidly worsening  Location:  Face  Associated Symptoms: no fever, no chills  Characteristics: redness    Characteristics: not draining and not painful        Constitution: Negative for chills and fever.   HENT: Negative for facial swelling and sore throat.    Neck: Negative for painful lymph nodes.   Eyes: Negative for eye itching and eyelid swelling.   Respiratory: Negative for cough.    Musculoskeletal: Negative for joint pain and joint swelling.   Skin: Positive for color change, rash, erythema and abscess. Negative for pale, wound, abrasion, laceration, lesion, skin thickening/induration, puncture wound, avulsion and hives.   Allergic/Immunologic: Negative for environmental allergies, immunocompromised state and hives.   Hematologic/Lymphatic: Negative for swollen lymph nodes.       Objective:      Physical Exam   Constitutional: He is oriented to person, place, and time. He appears well-developed and well-nourished.   HENT:   Head: Normocephalic and atraumatic. Head is without abrasion, without contusion and without laceration.   Mouth/Throat: Mucous membranes are normal.   Eyes: Pupils are equal, round, and reactive to light. Conjunctivae, EOM and lids are normal. Right eye exhibits no discharge. Left eye exhibits no discharge.   NORMAL " "VISION, NORMAL EOM   Neck: Trachea normal, full passive range of motion without pain and phonation normal. Neck supple.   Cardiovascular: Normal rate, regular rhythm and normal heart sounds.   Pulmonary/Chest: Effort normal and breath sounds normal. No stridor. No respiratory distress.   Musculoskeletal: Normal range of motion.   Neurological: He is alert and oriented to person, place, and time.   Skin: Skin is warm, dry, intact and rash. Capillary refill takes less than 2 seconds.   4-5 AREAS OF SCALP ABSCESSES/RAISED FOLLICULITIS WHICH DRAINS PUS WITHMINIMAL MAUAL PRESSURE.    RIGHT EYEBROW 2 CM ABSCESS, FLUCTUENT, TTP,SUPEROLATERAL EYELID AND LATERAL EYEBROW. NO ACTIVE DRAINAGE Lesions:  erythemaabrasion, burn, bruising and ecchymosis  Psychiatric: He has a normal mood and affect. His speech is normal. Cognition and memory are normal.   Nursing note and vitals reviewed.        Incision & Drainage  Date/Time: 5/20/2020 2:40 PM  Performed by: Irving Bowens MD  Authorized by: Irving Bowens MD     Time out: Immediately prior to procedure a "time out" was called to verify the correct patient, procedure, equipment, support staff and site/side marked as required.    Consent Done?:  Yes (Verbal)    Type:  Abscess  Body area:  Head/neck  Location details:  Right eyelid  Local anesthetic: lidocaine 2% without epinephrine  Anesthetic total (ml):  2  Scalpel size:  11  Incision type:  Single straight  Complexity:  Simple  Drainage:  Pus  Drainage amount:  Moderate  Wound treatment:  Incision and drainage  Patient tolerance:  Patient tolerated the procedure well with no immediate complications    COVERED WITH BACTROBAN OINTMENT  COVERED WITH GAUZE  NO COMPLICATIONS  GIVEN INSTRUCTIONS OF WHEN TO RETURN          Assessment:       1. Abscess, eyebrow    2. Abscess or cellulitis of scalp        Plan:         Abscess, eyebrow  -     Incision & Drainage    Abscess or cellulitis of scalp  -     Incision & " Drainage    Other orders  -     sulfamethoxazole-trimethoprim 800-160mg (BACTRIM DS) 800-160 mg Tab; Take 1 tablet by mouth 2 (two) times daily. for 10 days  Dispense: 20 tablet; Refill: 0  -     mupirocin (BACTROBAN) 2 % ointment; Apply to affected area 3 times daily  Dispense: 22 g; Refill: 1         Patient Instructions     AVOID PRIOR USED SHAMPOO  WASH WITH ANTIBACTERIAL SOAP AND WATER LIKE DIAL  BACTROBAN OINTMENT RX  BACTRIM DS RX  RETURN FOR ANY CONCERNS OR PROBLEMS      Abscess (Incision & Drainage)  An abscess is sometimes called a boil. It happens when bacteria get trapped under the skin and start to grow. Pus forms inside the abscess as the body responds to the bacteria. An abscess can happen with an insect bite, ingrown hair, blocked oil gland, pimple, cyst, or puncture wound.  Your healthcare provider has drained the pus from your abscess. If the abscess pocket was large, your healthcare provider may have put in gauze packing. Your provider will need to remove it on your next visit. He or she may also replace it at that time. You may not need antibiotics to treat a simple abscess, unless the infection is spreading into the skin around the wound (cellulitis).  The wound will take about 1 to 2 weeks to heal, depending on the size of the abscess. Healthy tissue will grow from the bottom and sides of the opening until it seals over.  Home care  These tips can help your wound heal:  · The wound may drain for the first 2 days. Cover the wound with a clean dry dressing. Change the dressing if it becomes soaked with blood or pus.  · If a gauze packing was placed inside the abscess pocket, you may be told to remove it yourself. You may do this in the shower. Once the packing is removed, you should wash the area in the shower, or clean the area as directed by your provider. Continue to do this until the skin opening has closed. Make sure you wash your hands after changing the packing or cleaning the wound.  · If  you were prescribed antibiotics, take them as directed until they are all gone.  · You may use acetaminophen or ibuprofen to control pain, unless another pain medicine was prescribed. If you have liver disease or ever had a stomach ulcer, talk with your doctor before using these medicines.  Follow-up care  Follow up with your healthcare provider, or as advised. If a gauze packing was put in your wound, it should be removed in 1 to 2 days. Check your wound every day for any signs that the infection is getting worse. The signs are listed below.  When to seek medical advice  Call your healthcare provider right away if any of these occur:  · Increasing redness or swelling  · Red streaks in the skin leading away from the wound  · Increasing local pain or swelling  · Continued pus draining from the wound 2 days after treatment  · Fever of 100.4ºF (38ºC) or higher, or as directed by your healthcare provider  · Boil returns when you are at home  Date Last Reviewed: 9/1/2016  © 6967-7341 [employer]. [address] [place] [address]. All rights reserved. This information is not intended as a substitute for professional medical care. Always follow your healthcare professional's instructions.        Discharge Instructions for Cellulitis  You have been diagnosed with cellulitis. This is an infection in the deepest layer of the skin. In some cases, the infection also affects the muscle. Cellulitis is caused by bacteria. The bacteria can enter the body through broken skin. This can happen with a cut, scratch, animal bite, or an insect bite that has been scratched. You may have been treated in the hospital with antibiotics and fluids. You will likely be given a prescription for antibiotics to take at home. This sheet will help you take care of yourself at home.  Home care  When you are home:  · Take the prescribed antibiotic medicine you are given as directed until it is gone. Take it even if you feel  better. It treats the infection and stops it from returning. Not taking all the medicine can make future infections hard to treat.  · Keep the infected area clean.  · When possible, raise the infected area above the level of your heart. This helps keep swelling down.  · Talk with your healthcare provider if you are in pain. Ask what kind of over-the-counter medicine you can take for pain.  · Apply clean bandages as advised.  · Take your temperature once a day for a week.  · Wash your hands often to prevent spreading the infection.  In the future, wash your hands before and after you touch cuts, scratches, or bandages. This will help prevent infection.   When to call your healthcare provider  Call your healthcare provider immediately if you have any of the following:  · Difficulty or pain when moving the joints above or below the infected area  · Discharge or pus draining from the area  · Fever of 100.4°F (38°C) or higher, or as directed by your healthcare provider  · Pain that gets worse in or around the infected   · Redness that gets worse in or around the infected area, particularly if the area of redness expands to a wider area  · Shaking chills  · Swelling of the infected area  · Vomiting   Date Last Reviewed: 8/1/2016  © 3546-3805 [employer]. [address] [place] [address], [place] [address]. All rights reserved. This information is not intended as a substitute for professional medical care. Always follow your healthcare professional's instructions.

## 2020-07-19 DIAGNOSIS — I10 ESSENTIAL HYPERTENSION: Chronic | ICD-10-CM

## 2020-07-19 NOTE — TELEPHONE ENCOUNTER
Care Due:                  Date            Visit Type   Department     Provider  --------------------------------------------------------------------------------                                ESTABLISHED   [facility] INTERNAL  Last Visit: 08-      PATIENT      MEDICINE       TRIXIE SMITH                                           [facility] INTERNAL  Next Visit: 09-      None         MEDICINE       TRIXIE SMITH                                                            Last  Test          Frequency    Reason                     Performed    Due Date  --------------------------------------------------------------------------------    Ca..........  12 months..  ergocalciferol...........  08- 08-    Cr..........  6 months...  chlorthalidone, losartan.  10-   04-    K...........  6 months...  chlorthalidone, losartan.  10-   04-    Na..........  6 months...  chlorthalidone...........  10-   04-    Vitamin D...  12 months..  ergocalciferol...........  Not Found    Overdue    Powered by [employer]. Reference number: [id number]. 7/19/2020 1:30:36 AM CDT

## 2020-07-20 NOTE — PROGRESS NOTES
Refill Routing Note   Medication(s) are not appropriate for processing by [facility]:       - Required vitals are abnormal  - Patient has been seen in the Emergency Room/Department since the last PCP visit        Medication Therapy Plan: Recent ED visit for an Abscess(10/19); BP elevated at urgent care visit; FLOS/FOVS; Defer to you  Medication reconciliation completed: No      Automatic Epic Generated Protocol Data:        Requested Prescriptions   Pending Prescriptions Disp Refills    chlorthalidone (HYGROTEN) 50 MG Tab [Pharmacy Med Name: CHLORTHALIDONE 50MG TABLETS] 90 tablet 0     Sig: TAKE 1 TABLET(50 MG) BY MOUTH EVERY DAY       Cardiovascular: Diuretics - Thiazide Failed - 7/19/2020  1:30 AM        Failed - Last BP in normal range within 360 days.     BP Readings from Last 3 Encounters:   05/20/20 (!) 140/83   10/22/19 (!) 157/77   08/14/19 138/72              Failed - Cr is 1.3 or below and within 180 days     Creatinine   Date Value Ref Range Status   08/22/2019 1.3 0.5 - 1.4 mg/dL Final   05/23/2019 1.1 0.5 - 1.4 mg/dL Final   02/27/2019 0.9 0.5 - 1.4 mg/dL Final     POC Creatinine   Date Value Ref Range Status   10/22/2019 1.0 0.5 - 1.4 mg/dL Final   10/22/2019 1.0 0.5 - 1.4 mg/dL Final              Failed - K in normal range and within 180 days     POC Potassium   Date Value Ref Range Status   10/22/2019 3.7 3.5 - 5.1 mmol/L Final     Potassium   Date Value Ref Range Status   08/22/2019 3.7 3.5 - 5.1 mmol/L Final   05/23/2019 3.8 3.5 - 5.1 mmol/L Final   02/27/2019 4.1 3.5 - 5.1 mmol/L Final              Failed - Na is between 130 and 148 and within 180 days     POC Sodium   Date Value Ref Range Status   10/22/2019 139 136 - 145 mmol/L Final     Sodium   Date Value Ref Range Status   08/22/2019 139 136 - 145 mmol/L Final   05/23/2019 139 136 - 145 mmol/L Final   02/27/2019 141 136 - 145 mmol/L Final              Failed - eGFR within 180 days     eGFR if non    Date Value Ref  Range Status   08/22/2019 60 >60 mL/min/1.73 m^2 Final     Comment:     Calculation used to obtain the estimated glomerular filtration  rate (eGFR) is the CKD-EPI equation.      05/23/2019 >60 >60 mL/min/1.73 m^2 Final     Comment:     Calculation used to obtain the estimated glomerular filtration  rate (eGFR) is the CKD-EPI equation.      02/27/2019 >60 >60 mL/min/1.73 m^2 Final     Comment:     Calculation used to obtain the estimated glomerular filtration  rate (eGFR) is the CKD-EPI equation.        eGFR if    Date Value Ref Range Status   08/22/2019 >60 >60 mL/min/1.73 m^2 Final   05/23/2019 >60 >60 mL/min/1.73 m^2 Final   02/27/2019 >60 >60 mL/min/1.73 m^2 Final              Passed - Patient is at least 18 years old        Passed - Office visit in past 12 months or future 90 days.     Recent Outpatient Visits            2 months ago [facility], eyebrow [facility] Irving Bowens MD    11 months ago Annual physical exam    [facility] - Internal Medicine Nicolas Adams MD    1 year ago Essential hypertension    [facility] KERWIN ValadezC    1 year ago Essential hypertension    [facility] Nicolas Adams MD    1 year ago Essential hypertension    [facility] Leanne Jack, IRISP-C          Future Appointments              In 1 month LAB, [facility]    In 1 month [facility], David Purvis PCW                Passed - Ca in normal range and within 360 days     Calcium   Date Value Ref Range Status   08/22/2019 8.7 8.7 - 10.5 mg/dL Final   05/23/2019 9.4 8.7 - 10.5 mg/dL Final   02/27/2019 9.1 8.7 - 10.5 mg/dL Final                    Appointments  past 12m or future 3m with PCP    Date Provider   Last Visit   8/14/2019 Nicolas Adams MD   Next Visit   9/2/2020 Nicolas Adams MD   ED visits in past 90 days:  0     Note composed:5:36 PM 07/20/2020

## 2020-07-24 RX ORDER — CHLORTHALIDONE 50 MG/1
TABLET ORAL
Qty: 90 TABLET | Refills: 0 | Status: SHIPPED | OUTPATIENT
Start: 2020-07-24 | End: 2020-11-11

## 2020-07-27 DIAGNOSIS — I10 ESSENTIAL HYPERTENSION: Chronic | ICD-10-CM

## 2020-07-27 RX ORDER — LOSARTAN POTASSIUM 100 MG/1
TABLET ORAL
Qty: 90 TABLET | Refills: 1 | Status: SHIPPED | OUTPATIENT
Start: 2020-07-27 | End: 2021-02-26

## 2020-07-27 NOTE — TELEPHONE ENCOUNTER
----- Message from Joanne Gonzalez sent at 7/27/2020 11:39 AM CDT -----  Contact: self [phone]  Requesting an RX refill or new RX.  Is this a refill or new RX:  new  RX name and strength: losartan (COZAAR) 100 MG tablet  Directions (copy/paste from chart):  TAKE 1 TABLET(100 MG) BY MOUTH EVERY DAY FOR BLOOD PRESSURE  Is this a 30 day or 90 day RX:  90  Local pharmacy or mail order pharmacy:  local  Pharmacy name and phone # (copy/paste from chart):   [id number] [place] [address] AT [facility] [address] [phone] (Phone)  [phone] (Fax)  Comments:  Pt states he has been out of his medication for 4 days.

## 2020-07-27 NOTE — TELEPHONE ENCOUNTER
No new care gaps identified.  Powered by [employer]. Reference number: [id number]. 7/27/2020 11:47:00 AM   CDT

## 2020-08-19 ENCOUNTER — PATIENT OUTREACH (OUTPATIENT)
Dept: ADMINISTRATIVE | Facility: HOSPITAL | Age: 60
End: 2020-08-19

## 2020-08-29 ENCOUNTER — LAB VISIT (OUTPATIENT)
Dept: LAB | Facility: HOSPITAL | Age: 60
End: 2020-08-29
Attending: INTERNAL MEDICINE
Payer: COMMERCIAL

## 2020-08-29 DIAGNOSIS — E55.9 VITAMIN D DEFICIENCY: ICD-10-CM

## 2020-08-29 DIAGNOSIS — E11.9 CONTROLLED TYPE 2 DIABETES MELLITUS WITHOUT COMPLICATION, WITHOUT LONG-TERM CURRENT USE OF INSULIN: ICD-10-CM

## 2020-08-29 DIAGNOSIS — I10 ESSENTIAL HYPERTENSION: ICD-10-CM

## 2020-08-29 DIAGNOSIS — D64.9 MILD ANEMIA: ICD-10-CM

## 2020-08-29 LAB
25(OH)D3+25(OH)D2 SERPL-MCNC: 19 NG/ML (ref 30–96)
ANION GAP SERPL CALC-SCNC: 11 MMOL/L (ref 8–16)
BASOPHILS # BLD AUTO: 0.12 K/UL (ref 0–0.2)
BASOPHILS NFR BLD: 0.8 % (ref 0–1.9)
BUN SERPL-MCNC: 27 MG/DL (ref 6–20)
CALCIUM SERPL-MCNC: 9.3 MG/DL (ref 8.7–10.5)
CHLORIDE SERPL-SCNC: 101 MMOL/L (ref 95–110)
CO2 SERPL-SCNC: 27 MMOL/L (ref 23–29)
CREAT SERPL-MCNC: 1.7 MG/DL (ref 0.5–1.4)
DIFFERENTIAL METHOD: ABNORMAL
EOSINOPHIL # BLD AUTO: 0.3 K/UL (ref 0–0.5)
EOSINOPHIL NFR BLD: 2 % (ref 0–8)
ERYTHROCYTE [DISTWIDTH] IN BLOOD BY AUTOMATED COUNT: 14.6 % (ref 11.5–14.5)
EST. GFR  (AFRICAN AMERICAN): 49.6 ML/MIN/1.73 M^2
EST. GFR  (NON AFRICAN AMERICAN): 42.9 ML/MIN/1.73 M^2
ESTIMATED AVG GLUCOSE: 143 MG/DL (ref 68–131)
GLUCOSE SERPL-MCNC: 123 MG/DL (ref 70–110)
HBA1C MFR BLD HPLC: 6.6 % (ref 4–5.6)
HCT VFR BLD AUTO: 41 % (ref 40–54)
HGB BLD-MCNC: 12.9 G/DL (ref 14–18)
IMM GRANULOCYTES # BLD AUTO: 0.2 K/UL (ref 0–0.04)
IMM GRANULOCYTES NFR BLD AUTO: 1.3 % (ref 0–0.5)
LYMPHOCYTES # BLD AUTO: 3.6 K/UL (ref 1–4.8)
LYMPHOCYTES NFR BLD: 22.5 % (ref 18–48)
MCH RBC QN AUTO: 28.5 PG (ref 27–31)
MCHC RBC AUTO-ENTMCNC: 31.5 G/DL (ref 32–36)
MCV RBC AUTO: 91 FL (ref 82–98)
MONOCYTES # BLD AUTO: 1.1 K/UL (ref 0.3–1)
MONOCYTES NFR BLD: 6.8 % (ref 4–15)
NEUTROPHILS # BLD AUTO: 10.7 K/UL (ref 1.8–7.7)
NEUTROPHILS NFR BLD: 66.6 % (ref 38–73)
NRBC BLD-RTO: 0 /100 WBC
PLATELET # BLD AUTO: 342 K/UL (ref 150–350)
PMV BLD AUTO: 9.2 FL (ref 9.2–12.9)
POTASSIUM SERPL-SCNC: 3.5 MMOL/L (ref 3.5–5.1)
RBC # BLD AUTO: 4.53 M/UL (ref 4.6–6.2)
SODIUM SERPL-SCNC: 139 MMOL/L (ref 136–145)
WBC # BLD AUTO: 15.96 K/UL (ref 3.9–12.7)

## 2020-08-29 PROCEDURE — 82306 VITAMIN D 25 HYDROXY: CPT

## 2020-08-29 PROCEDURE — 36415 COLL VENOUS BLD VENIPUNCTURE: CPT

## 2020-08-29 PROCEDURE — 80048 BASIC METABOLIC PNL TOTAL CA: CPT

## 2020-08-29 PROCEDURE — 85025 COMPLETE CBC W/AUTO DIFF WBC: CPT

## 2020-08-29 PROCEDURE — 83036 HEMOGLOBIN GLYCOSYLATED A1C: CPT

## 2020-09-25 ENCOUNTER — PATIENT MESSAGE (OUTPATIENT)
Dept: OTHER | Facility: OTHER | Age: 60
End: 2020-09-25

## 2020-10-05 ENCOUNTER — PATIENT MESSAGE (OUTPATIENT)
Dept: ADMINISTRATIVE | Facility: HOSPITAL | Age: 60
End: 2020-10-05

## 2020-10-23 DIAGNOSIS — Z12.11 COLON CANCER SCREENING: ICD-10-CM

## 2020-10-30 ENCOUNTER — PATIENT MESSAGE (OUTPATIENT)
Dept: ADMINISTRATIVE | Facility: HOSPITAL | Age: 60
End: 2020-10-30

## 2021-01-04 ENCOUNTER — PATIENT MESSAGE (OUTPATIENT)
Dept: ADMINISTRATIVE | Facility: HOSPITAL | Age: 61
End: 2021-01-04

## 2021-02-17 ENCOUNTER — TELEPHONE (OUTPATIENT)
Dept: INTERNAL MEDICINE | Facility: CLINIC | Age: 61
End: 2021-02-17

## 2021-02-25 DIAGNOSIS — I10 ESSENTIAL HYPERTENSION: Chronic | ICD-10-CM

## 2021-03-01 RX ORDER — LOSARTAN POTASSIUM 100 MG/1
TABLET ORAL
Qty: 90 TABLET | Refills: 0 | Status: ON HOLD | OUTPATIENT
Start: 2021-03-01 | End: 2023-11-21 | Stop reason: SDUPTHER

## 2021-04-16 ENCOUNTER — PATIENT MESSAGE (OUTPATIENT)
Dept: RESEARCH | Facility: HOSPITAL | Age: 61
End: 2021-04-16

## 2021-05-27 ENCOUNTER — PATIENT MESSAGE (OUTPATIENT)
Dept: ADMINISTRATIVE | Facility: HOSPITAL | Age: 61
End: 2021-05-27

## 2021-05-27 ENCOUNTER — PATIENT OUTREACH (OUTPATIENT)
Dept: ADMINISTRATIVE | Facility: HOSPITAL | Age: 61
End: 2021-05-27

## 2021-06-22 ENCOUNTER — TELEPHONE (OUTPATIENT)
Dept: INTERNAL MEDICINE | Facility: CLINIC | Age: 61
End: 2021-06-22

## 2023-01-12 ENCOUNTER — HOSPITAL ENCOUNTER (EMERGENCY)
Facility: HOSPITAL | Age: 63
Discharge: HOME | End: 2023-01-12
Attending: EMERGENCY MEDICINE
Payer: MEDICAID

## 2023-01-12 VITALS
HEART RATE: 85 BPM | BODY MASS INDEX: 38.45 KG/M2 | SYSTOLIC BLOOD PRESSURE: 162 MMHG | RESPIRATION RATE: 16 BRPM | WEIGHT: 245 LBS | DIASTOLIC BLOOD PRESSURE: 84 MMHG | TEMPERATURE: 100 F | OXYGEN SATURATION: 96 % | HEIGHT: 67 IN

## 2023-01-12 DIAGNOSIS — M25.539 WRIST PAIN: ICD-10-CM

## 2023-01-12 DIAGNOSIS — J06.9 VIRAL URI WITH COUGH: Primary | ICD-10-CM

## 2023-01-12 LAB
INFLUENZA A, MOLECULAR: NOT DETECTED
INFLUENZA B, MOLECULAR: NOT DETECTED
RSV AG BY MOLECULAR METHOD: NOT DETECTED
SARS-COV-2 RNA RESP QL NAA+PROBE: NOT DETECTED

## 2023-01-12 PROCEDURE — 99283 PR EMERGENCY DEPT VISIT,LEVEL III: ICD-10-PCS | Mod: ,,, | Performed by: EMERGENCY MEDICINE

## 2023-01-12 PROCEDURE — 99283 EMERGENCY DEPT VISIT LOW MDM: CPT

## 2023-01-12 PROCEDURE — 0241U SARS-COV2 (COVID) WITH FLU/RSV BY PCR: CPT | Performed by: PHYSICIAN ASSISTANT

## 2023-01-12 PROCEDURE — 25000003 PHARM REV CODE 250: Performed by: EMERGENCY MEDICINE

## 2023-01-12 PROCEDURE — 99283 EMERGENCY DEPT VISIT LOW MDM: CPT | Mod: ,,, | Performed by: EMERGENCY MEDICINE

## 2023-01-12 RX ORDER — INDOMETHACIN 50 MG/1
50 CAPSULE ORAL
Status: COMPLETED | OUTPATIENT
Start: 2023-01-12 | End: 2023-01-12

## 2023-01-12 RX ADMIN — INDOMETHACIN 50 MG: 50 CAPSULE ORAL at 07:01

## 2023-01-12 NOTE — FIRST PROVIDER EVALUATION
Emergency Department TeleTriage Encounter Note      CHIEF COMPLAINT    Chief Complaint   Patient presents with    URI     Cough, congestion since Tuesday.       VITAL SIGNS   Initial Vitals [01/12/23 1600]   BP Pulse Resp Temp SpO2   (!) 162/84 85 16 99.6 °F (37.6 °C) 96 %      MAP       --            ALLERGIES    Review of patient's allergies indicates:  No Known Allergies    PROVIDER TRIAGE NOTE  This is a teletriage evaluation of a 62 y.o. male presenting to the ED complaining of cough and congestion since Tuesday.     Initial orders will be placed and care will be transferred to an alternate provider when patient is roomed for a full evaluation. Any additional orders and the final disposition will be determined by that provider.       ORDERS  Labs Reviewed   SARS-COV2 (COVID) WITH FLU/RSV BY PCR       ED Orders (720h ago, onward)      Start Ordered     Status Ordering Provider    01/12/23 1713 01/12/23 1712  SARS-Cov2 (COVID) with FLU/RSV by PCR  Once         Ordered LUIS HUANG              Virtual Visit Note: The provider triage portion of this emergency department evaluation and documentation was performed via [employer], a HIPAA-compliant telemedicine application, in concert with a tele-presenter in the room. A face to face patient evaluation with one of my colleagues will occur once the patient is placed in an emergency department room.      DISCLAIMER: This note was prepared with [employer] voice recognition transcription software. Garbled syntax, mangled pronouns, and other bizarre constructions may be attributed to that software system.

## 2023-01-13 NOTE — ED PROVIDER NOTES
Encounter Date: 1/12/2023       History     Chief Complaint   Patient presents with    URI     Cough, congestion since Tuesday.     62-year-old gentleman with past medical history including diabetes and hypertension presents for emergent evaluation of cough and nasal congestion.  He reports onset of symptoms 2 days ago.  He reports onset of symptoms after being around a family member who was diagnosed with COVID.  He denies any fever.  Cough is mild, nonproductive.  He reports that he is a smoker.  He denies any chest pain or significant shortness of breath.  He also endorses that 2 days ago he started having left wrist pain.  Denies any trauma.  Denies any history of gout.    The history is provided by the patient.   Review of patient's allergies indicates:  No Known Allergies  Past Medical History:   Diagnosis Date    C. difficile colitis 2013    hospitalized for 9 days    Diabetes mellitus     Diverticulitis 2014    required hospitalization    Hypertension     Pneumonia 2014     Past Surgical History:   Procedure Laterality Date    ABCESS DRAINAGE  03/2016    perineum     Family History   Problem Relation Age of Onset    Diabetes Father     Cancer Paternal Grandmother         lung (smoker)    Diabetes Paternal Grandfather     Diabetes Other     Heart disease Mother      Social History     Tobacco Use    Smoking status: Every Day     Packs/day: 1.00     Years: 30.00     Pack years: 30.00     Types: Cigarettes    Smokeless tobacco: Never    Tobacco comments:     30 years, has quit a few times in the past   Substance Use Topics    Alcohol use: Yes     Comment: soc     Review of Systems   Constitutional:  Negative for fatigue and fever.   All other systems reviewed and are negative.    Physical Exam     Initial Vitals [01/12/23 1600]   BP Pulse Resp Temp SpO2   (!) 162/84 85 16 99.6 °F (37.6 °C) 96 %      MAP       --         Physical Exam    Nursing note and vitals reviewed.  Constitutional: Vital signs are normal.  He appears well-developed and well-nourished.   HENT:   Head: Normocephalic and atraumatic.   Eyes: Conjunctivae are normal.   Neck: Trachea normal. Neck supple.   Normal range of motion.  Cardiovascular:  Normal rate and normal pulses.           Pulmonary/Chest: Breath sounds normal. No tachypnea. No respiratory distress. He has no wheezes. He has no rhonchi.   Musculoskeletal:         General: Tenderness present.      Cervical back: Normal range of motion and neck supple.      Comments: Left wrist with swelling and tenderness.  No overlying erythema.  Full range of motion.  No evidence of deformity or trauma.  No appreciable joint effusion.  Neurovascularly intact with good cap refill.     Neurological: He is alert and oriented to person, place, and time.   Skin: Skin is warm and dry.       ED Course   Procedures  Labs Reviewed   SARS-COV2 (COVID) WITH FLU/RSV BY PCR          Imaging Results    None          Medications   indomethacin capsule 50 mg (50 mg Oral Given 1/12/23 1902)     Medical Decision Making:   History:   Old Medical Records: I decided to obtain old medical records.  Old Records Summarized: records from clinic visits.       <> Summary of Records: Followed by Dermatology for hidradenitis  Initial Assessment:   Emergent evaluation of URI symptoms as well as left wrist pain  Clinical Tests:   Lab Tests: Ordered and Reviewed  Radiological Study: Ordered  ED Management:  For evaluation of the patient's URI symptoms.  Initial differential includes viral syndrome, COVID, influenza.  I doubt pneumonia.  I doubt pulmonary embolism.  There are no signs or symptoms concerning for congestive heart failure.  He has exposure to COVID illness.  He is not received his COVID booster.  Testing was obtained.  This testing was negative.  For evaluation of his wrist complaint.  I considered septic arthritis, gouty arthritis, trauma.  An x-ray was ordered.  However the patient left the department without completion of  this full evaluation.           ED Course as of 01/13/23 1256   Thu Jan 12, 2023 1949 Patient was frustrated that the x-ray was taking so long in notified the nurse that he was going to leave. [HS]      ED Course User Index  [HS] Angelic Alvarez MD                 Clinical Impression:   Final diagnoses:  [M25.539] Wrist pain  [J06.9] Viral URI with cough (Primary)        ED Disposition Condition    Discharge Stable          ED Prescriptions    None       Follow-up Information       Follow up With Specialties Details Why Contact Info    Nicolas Adams MD Internal Medicine Schedule an appointment as soon as possible for a visit  As needed [id number] [facility] [address]  [phone]               Angelic Alvarez MD  01/13/23 1256

## 2023-01-13 NOTE — ED NOTES
"Patient identifiers verified and correct for Christiano Dahl  C/C: Pt states "I was having a cough and some congestion"  APPEARANCE: awake and alert in no acute distress.  SKIN: warm, dry and intact. No breakdown or bruising.  MUSCULOSKELETAL: Patient moving all extremities spontaneously, no obvious swelling or deformities noted. Ambulates independently.  RESPIRATORY: c/o shortness of breath. Respirations unlabored.   CARDIAC: Denies CP, 2+ distal pulses; no peripheral edema  ABDOMEN: soft, non-tender, and non-distended, Denies N/V  : voids spontaneously, denies difficulty  Neurologic: AAO x 4; follows commands equal strength in all extremities; denies numbness/tingling. Denies dizziness    "

## 2023-05-04 ENCOUNTER — HOSPITAL ENCOUNTER (EMERGENCY)
Facility: HOSPITAL | Age: 63
Discharge: HOME | End: 2023-05-05
Attending: EMERGENCY MEDICINE
Payer: MEDICAID

## 2023-05-04 DIAGNOSIS — M25.569 KNEE PAIN, UNSPECIFIED CHRONICITY, UNSPECIFIED LATERALITY: Primary | ICD-10-CM
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
## 2023-05-04 PROCEDURE — 99284 PR EMERGENCY DEPT VISIT,LEVEL IV: ICD-10-PCS | Mod: ,,, | Performed by: EMERGENCY MEDICINE

## 2023-05-04 PROCEDURE — 99284 EMERGENCY DEPT VISIT MOD MDM: CPT | Mod: ,,, | Performed by: EMERGENCY MEDICINE

## 2023-05-04 PROCEDURE — 25000003 PHARM REV CODE 250: Performed by: PHYSICIAN ASSISTANT

## 2023-05-04 PROCEDURE — 25000003 PHARM REV CODE 250

## 2023-05-04 PROCEDURE — 99284 EMERGENCY DEPT VISIT MOD MDM: CPT

## 2023-05-04 RX ORDER — ACETAMINOPHEN 500 MG
1000 TABLET ORAL
Status: COMPLETED | OUTPATIENT
Start: 2023-05-04 | End: 2023-05-04

## 2023-05-04 RX ORDER — OXYCODONE HYDROCHLORIDE 5 MG/1
5 TABLET ORAL
Status: COMPLETED | OUTPATIENT
Start: 2023-05-04 | End: 2023-05-04

## 2023-05-04 RX ADMIN — OXYCODONE 5 MG: 5 TABLET ORAL at 11:05

## 2023-05-04 RX ADMIN — ACETAMINOPHEN 1000 MG: 500 TABLET ORAL at 10:05

## 2023-05-04 NOTE — Clinical Note
"Christiano Blackburn" Hesham was seen and treated in our emergency department on 5/4/2023.  He may return to work on 05/06/2023.       If you have any questions or concerns, please don't hesitate to call.      Pebbles Riojas MD"

## 2023-05-05 VITALS
HEIGHT: 67 IN | HEART RATE: 81 BPM | DIASTOLIC BLOOD PRESSURE: 62 MMHG | SYSTOLIC BLOOD PRESSURE: 112 MMHG | TEMPERATURE: 98 F | WEIGHT: 240 LBS | RESPIRATION RATE: 14 BRPM | OXYGEN SATURATION: 99 % | BODY MASS INDEX: 37.67 KG/M2

## 2023-05-05 PROCEDURE — 25000003 PHARM REV CODE 250

## 2023-05-05 RX ORDER — MORPHINE SULFATE 15 MG/1
15 TABLET ORAL
Status: COMPLETED | OUTPATIENT
Start: 2023-05-05 | End: 2023-05-05

## 2023-05-05 RX ADMIN — MORPHINE SULFATE 15 MG: 15 TABLET ORAL at 01:05

## 2023-05-05 NOTE — ED PROVIDER NOTES
Encounter Date: 5/4/2023       History     Chief Complaint   Patient presents with    Knee Pain    Neck Pain    Headache     Coming from the [place] for a fall. PT states that he fell hit the knee and after hitting the knee, hit the head. Now we have head and neck pain along with knee pain.      62-year-old right-handed male with past medical history of hypertension and non-insulin-dependent diabetes now presenting with headache, neck pain, right hand pain, left knee pain following a fall.  Patient reports that he was walking down the stairs when he slipped on wet stairs and fell down 2 stairs hitting the back of his head landing on his right hand and left knee.  Patient denies any loss of consciousness, new weakness, numbness, paralysis, bladder or bowel voiding difficulties.  Additionally patient denies any preceding, dizziness, chest pain, shortness of breath.  Patient denies any blood thinner use.    Review of patient's allergies indicates:  No Known Allergies  Past Medical History:   Diagnosis Date    C. difficile colitis 2013    hospitalized for 9 days    Diabetes mellitus     Diverticulitis 2014    required hospitalization    Hypertension     Pneumonia 2014     Past Surgical History:   Procedure Laterality Date    [facility] DRAINAGE  03/2016    perineum     Family History   Problem Relation Age of Onset    Diabetes Father     Cancer Paternal Grandmother         lung (smoker)    Diabetes Paternal Grandfather     Diabetes Other     Heart disease Mother      Social History     Tobacco Use    Smoking status: Every Day     Packs/day: 1.00     Years: 30.00     Pack years: 30.00     Types: Cigarettes    Smokeless tobacco: Never    Tobacco comments:     30 years, has quit a few times in the past   Substance Use Topics    Alcohol use: Yes     Comment: soc     Review of Systems    Physical Exam     Initial Vitals [05/04/23 2029]   BP Pulse Resp Temp SpO2   112/84 84 20 97.8 °F (36.6 °C) 98 %      MAP       --          Physical Exam    Nursing note and vitals reviewed.    Gen: AxOx3, well nourished, appears stated age, no pallor, no jaundice, appears well hydrated  Eye: EOMI, no scleral icterus, no periorbital edema or ecchymosis  Head: Normocephalic, atraumatic, no lesions, scalp appears normal  ENT: Neck supple, no stridor, no masses, no drooling or voice changes  CVS: All distal pulses intact with normal rate and rhythm, no JVD, normal S1/S2, no murmur  Pulm: Normal breath sounds, no wheezes, rales or rhonchi, no increased work of breathing  Abd:  Nondistended, soft, nontender, no organomegaly, no CVAT  Ext: No edema, no rashes, or deformity  Trauma survey negative for tenderness to palpation over skull, ribcage  - tenderness to palpation over midline of C-spine C1-3  -tenderness to palpation over right thumb  -tenderness to palpation over left knee  RUE:  -no gross deformities  - radial/median/ulnar nerve sensation intact  -radial/median/ulnar movement intact  -normal range of motion at shoulder, elbow, wrist  -thumb flexion/adduction limited by pain  -CRT<2    LLE:  - abrasion over knee  - no crepitus over knee cap  - dorsalis pedis pulses 2+  - sensation intact over entire extremity  - normal range of motion at hip, knee, ankle      Neuro: GCS15, moving all extremities, gait intact, face grossly symmetric  Psych: normal affect, cooperative, well groomed, makes good eye contact      ED Course   Procedures  Labs Reviewed - No data to display         Imaging Results              CT Head Without Contrast (Final result)  Result time 05/05/23 01:31:00      Final result by Bobby Smith MD (05/05/23 01:31:00)                   Impression:      1. No evidence of acute intracranial pathology.  2. Soft tissue edema overlying the left posterior scalp without underlying fracture.  3. Multiple soft tissue density lesions of the scalp, possibly representing sebaceous cysts.  Correlate clinically.  4. No acute fracture or  traumatic malalignment of the cervical spine.  5. Degenerative changes most pronounced at C3-C4 with severe left neural foraminal narrowing.    Electronically signed by resident: Pebbles Kirk  Date:    05/05/2023  Time:    01:01    Electronically signed by: Bobby Smith MD  Date:    05/05/2023  Time:    01:31               Narrative:    EXAMINATION:  CT HEAD WITHOUT CONTRAST; CT CERVICAL SPINE WITHOUT CONTRAST    CLINICAL HISTORY:  Head trauma, minor (Age >= 65y);; Neck trauma (Age >= 65y);    TECHNIQUE:  Low dose axial CT images obtained throughout the head and cervical spine without the use of intravenous contrast.  Axial, sagittal and coronal reconstructions were performed.    COMPARISON:  None.    FINDINGS:  CT head:    Intracranial compartment:    Ventricles and sulci are normal in size for age without evidence of hydrocephalus.    The brain parenchyma appears within normal limits.  No parenchymal mass, hemorrhage, edema or major vascular distribution infarct.    No extra-axial blood or fluid collections.    Skull/extracranial contents (limited evaluation):    No fracture. Mild mucosal thickening throughout the paranasal sinuses.  Mastoid air cells are essentially clear.  Soft tissue edema overlying the left posterior scalp.  Multiple soft tissue density lesion of the scalp, possibly representing sebaceous cyst.    CT cervical spine:    Alignment: Mild reversal of the normal cervical lordosis.    Vertebrae: The dens, lateral masses, and occipital condyles are intact.  No acute fracture.  There are bulky bridging osteophytes along the left lateral aspect C5-T2 vertebra.    Discs: Mild multilevel disc height loss.    Degenerative findings: No high-grade spinal canal stenosis.  Severe left C3-C4 neural foraminal narrowing.    Paraspinal muscles & soft tissues: Few vascular calcifications.                                       CT Cervical Spine Without Contrast (Final result)  Result time 05/05/23 01:31:00       Final result by Bobby Smith MD (05/05/23 01:31:00)                   Impression:      1. No evidence of acute intracranial pathology.  2. Soft tissue edema overlying the left posterior scalp without underlying fracture.  3. Multiple soft tissue density lesions of the scalp, possibly representing sebaceous cysts.  Correlate clinically.  4. No acute fracture or traumatic malalignment of the cervical spine.  5. Degenerative changes most pronounced at C3-C4 with severe left neural foraminal narrowing.    Electronically signed by resident: Pebbles Kirk  Date:    05/05/2023  Time:    01:01    Electronically signed by: Bobby Smith MD  Date:    05/05/2023  Time:    01:31               Narrative:    EXAMINATION:  CT HEAD WITHOUT CONTRAST; CT CERVICAL SPINE WITHOUT CONTRAST    CLINICAL HISTORY:  Head trauma, minor (Age >= 65y);; Neck trauma (Age >= 65y);    TECHNIQUE:  Low dose axial CT images obtained throughout the head and cervical spine without the use of intravenous contrast.  Axial, sagittal and coronal reconstructions were performed.    COMPARISON:  None.    FINDINGS:  CT head:    Intracranial compartment:    Ventricles and sulci are normal in size for age without evidence of hydrocephalus.    The brain parenchyma appears within normal limits.  No parenchymal mass, hemorrhage, edema or major vascular distribution infarct.    No extra-axial blood or fluid collections.    Skull/extracranial contents (limited evaluation):    No fracture. Mild mucosal thickening throughout the paranasal sinuses.  Mastoid air cells are essentially clear.  Soft tissue edema overlying the left posterior scalp.  Multiple soft tissue density lesion of the scalp, possibly representing sebaceous cyst.    CT cervical spine:    Alignment: Mild reversal of the normal cervical lordosis.    Vertebrae: The dens, lateral masses, and occipital condyles are intact.  No acute fracture.  There are bulky bridging osteophytes along the left lateral  aspect C5-T2 vertebra.    Discs: Mild multilevel disc height loss.    Degenerative findings: No high-grade spinal canal stenosis.  Severe left C3-C4 neural foraminal narrowing.    Paraspinal muscles & soft tissues: Few vascular calcifications.                                       X-Ray Hand 3 view Right (Final result)  Result time 05/05/23 00:18:40      Final result by Rod Meng MD (05/05/23 00:18:40)                   Impression:      No acute radiographic abnormality.      Electronically signed by: Rod Meng  Date:    05/05/2023  Time:    00:18               Narrative:    EXAMINATION:  XR HAND COMPLETE 3 VIEW RIGHT    CLINICAL HISTORY:  fall;    TECHNIQUE:  PA, lateral, and oblique views of the right hand were performed.    COMPARISON:  None    FINDINGS:  No acute fracture, subluxation or dislocation.  No mass or foreign body.  No significant arthropathy.                                        X-Ray Knee 3 View Left (Final result)  Result time 05/04/23 23:10:47      Final result by Bobby Smith MD (05/04/23 23:10:47)                   Impression:      No acute fracture.    4.5 cm sessile exostosis or osteochondroma arising off the posteromedial aspect of the proximal left tibia.    This report was flagged in Epic as abnormal.      Electronically signed by: Bobby Smith MD  Date:    05/04/2023  Time:    23:10               Narrative:    EXAMINATION:  XR KNEE 3 VIEW LEFT    CLINICAL HISTORY:  Unspecified fall, initial encounter    TECHNIQUE:  AP, lateral, and Merchant views of the left knee were performed.    COMPARISON:  None    FINDINGS:  No fracture or dislocation.  4.5 cm sessile exostosis or osteochondroma arising off the posteromedial aspect of the proximal left tibia.  No joint effusion.  Cartilage spaces are maintained on nonweightbearing views.                                       Medications   acetaminophen tablet 1,000 mg (1,000 mg Oral Given 5/4/23 2220)   oxyCODONE immediate  release tablet 5 mg (5 mg Oral Given 5/4/23 2420)   morphine tablet 15 mg (15 mg Oral Given 5/5/23 6359)     Medical Decision Making:   Initial Assessment:   62-year-old male presenting following a fall. Patient is able to speak, breathing spontaneously, hemodynamically stable, oriented, moving all 4 limbs spontaneously.  Examination consistent with tenderness over C-spine, R thumb base, left kneecap    Differential Diagnosis:   Initial impression concerning for  Mechanical fall causing intracranial bleed, fractures, soft tissue injuries  Considered nonmechanical fall including CVA/ACS  Clinical Tests:   Radiological Study: Ordered and Reviewed  ED Management:  Patient's fall with tenderness on trauma survey x-ray hand was ordered which was reassuring.  X-ray of left knee showed exostosis with possible bone growth.  CT head and CT C-spine were ordered as per nexus criteria both of which were reassuring low concern for intracranial bleeding, fractures, or dislocations.  Given grossly benign workup patient was able to be discharged home.  Patient is referred to Orthopedics for follow-up for his but growth.          Attending Attestation:   Physician Attestation Statement for Resident:  As the supervising MD   Physician Attestation Statement: I have personally seen and examined this patient.   I agree with the above history.  -:   As the supervising MD I agree with the above PE.     As the supervising MD I agree with the above treatment, course, plan, and disposition.                  ED Course as of 05/05/23 0625   Fri May 05, 2023   0036 X-Ray Knee 3 View Left(!)  As per my independent interpretation no signs concerning for acute fracture dislocation however signs concerning for bone growth in keeping possible osteochondroma. [PM]   0036 X-Ray Hand 3 view Right  As per my independent interpretation no signs concerning for fracture or dislocation [PM]   0100 CT Cervical Spine Without Contrast  As per my independent  interpretation assess concerning for fracture or dislocation [PM]   0100 CT Head Without Contrast  As per my independent interpretation there are no acute findings suggestive of infarcts, fractures, space-occupying lesions, or infection   [PM]      ED Course User Index  [PM] Dori Yoon MD                   Clinical Impression:   Final diagnoses:  [W19.XXXA] Fall  [M25.569] Knee pain, unspecified chronicity, unspecified laterality (Primary)        ED Disposition Condition    Discharge Stable          ED Prescriptions    None       Follow-up Information    None          Dori Yoon MD  Resident  05/05/23 1651       Pebbles Riojas MD  05/05/23 1921

## 2023-05-05 NOTE — DISCHARGE INSTRUCTIONS
Your knee xray showed a: 4.5 cm sessile exostosis or osteochondroma arising off the posteromedial aspect of the proximal left tibia. This means there is an abnormal bone growth on your shin bone. You need to follow up with an orthopedic doctor for further care.

## 2023-05-05 NOTE — ED NOTES
Bed: [facility]  Expected date:   Expected time:   Means of arrival:   Comments:  yana

## 2023-05-05 NOTE — FIRST PROVIDER EVALUATION
"Medical screening examination initiated.  I have conducted a focused provider triage encounter, findings are as follows:    Brief history of present illness:  61 yo male not on ASA or blood thinners presents to [facility] ED via EMS for fall.     States he "just slipped". He denies prodromal symptoms. Endorsing posterior head, neck, and L knee pain. He denies lower back pain.     He did not have any medication at home or with EMS prior to arrival.     Vitals:    05/04/23 2029   BP: 112/84   Pulse: 84   Resp: 20   Temp: 97.8 °F (36.6 °C)   SpO2: 98%   Weight: 108.9 kg (240 lb)   Height: 5' 7" (1.702 m)       Pertinent physical exam:  Patient in C collar. L knee with limited ROM although intact. Mild TTP distal to L anterior knee.     Brief workup plan:  Will order ct head and neck and L knee x-ray and give acetaminophen for pain relief.    Preliminary workup initiated; this workup will be continued and followed by the physician or advanced practice provider that is assigned to the patient when roomed.  "

## 2023-05-05 NOTE — ED TRIAGE NOTES
Christiano Dahl, a 62 y.o. male presents to the ED w/ complaint of fall at a bar. Patient fell to his knees the struck the posterior side of his head. Patient brought in by EMS, arrives in c-collar.     Triage note:  Chief Complaint   Patient presents with    Knee Pain    Neck Pain    Headache     Coming from the [place] bar for a fall. PT states that he fell hit the knee and after hitting the knee, hit the head. Now we have head and neck pain along with knee pain.      Review of patient's allergies indicates:  No Known Allergies  Past Medical History:   Diagnosis Date    C. difficile colitis 2013    hospitalized for 9 days    Diabetes mellitus     Diverticulitis 2014    required hospitalization    Hypertension     Pneumonia 2014    Patient identifiers for Christiano Dahl checked and correct.    LOC: The patient is awake, alert and aware of environment with an appropriate affect, the patient is oriented x 4 and speaking appropriately.    APPEARANCE: Patient resting comfortably and in no acute distress, patient is clean and well groomed, patient's clothing is properly fastened.    SKIN: The skin is warm and dry, color consistent with ethnicity, patient has normal skin turgor and moist mucus membranes, skin intact, no breakdown or bruising noted.    MUSCULOSKELETAL: Patient moving all extremities well, no obvious swelling or deformities noted. Patient c/o knee pain and localized head pain.     RESPIRATORY: Airway is open and patent, respirations are spontaneous and even, patient has a normal effort and rate.    CARDIAC: Patient has a normal rate and rhythm, no periphreal edema noted, capillary refill < 3 seconds. Normal +2 pedal pulses present.    ABDOMEN: Soft and non tender to palpation, no distention noted. Patient denies any nausea, vomiting, diarrhea, or constipation.     NEUROLOGIC: Eyes open spontaneously, PERRL, behavior appropriate to situation, follows commands, facial expression symmetrical, bilateral  hand grasp equal and even, purposeful motor response noted, normal sensation in all extremities.     SKIN: Warm and dry, adequate turgor, mucus membranes moist and pink.

## 2023-07-29 ENCOUNTER — HOSPITAL ENCOUNTER (EMERGENCY)
Facility: HOSPITAL | Age: 63
Discharge: HOME | End: 2023-07-29
Attending: EMERGENCY MEDICINE
Payer: MEDICAID

## 2023-07-29 VITALS
WEIGHT: 235 LBS | DIASTOLIC BLOOD PRESSURE: 82 MMHG | BODY MASS INDEX: 36.81 KG/M2 | HEART RATE: 73 BPM | TEMPERATURE: 98 F | RESPIRATION RATE: 18 BRPM | SYSTOLIC BLOOD PRESSURE: 126 MMHG | OXYGEN SATURATION: 98 %

## 2023-07-29 DIAGNOSIS — M54.42 ACUTE BILATERAL LOW BACK PAIN WITH BILATERAL SCIATICA: ICD-10-CM
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
## 2023-07-29 DIAGNOSIS — M54.2 NECK PAIN: Primary | ICD-10-CM

## 2023-07-29 DIAGNOSIS — M54.41 ACUTE BILATERAL LOW BACK PAIN WITH BILATERAL SCIATICA: ICD-10-CM

## 2023-07-29 PROCEDURE — 96375 TX/PRO/DX INJ NEW DRUG ADDON: CPT

## 2023-07-29 PROCEDURE — 25000003 PHARM REV CODE 250: Performed by: EMERGENCY MEDICINE

## 2023-07-29 PROCEDURE — 96374 THER/PROPH/DIAG INJ IV PUSH: CPT

## 2023-07-29 PROCEDURE — 63600175 PHARM REV CODE 636 W HCPCS: Performed by: EMERGENCY MEDICINE

## 2023-07-29 PROCEDURE — 99285 EMERGENCY DEPT VISIT HI MDM: CPT | Mod: 25

## 2023-07-29 RX ORDER — KETOROLAC TROMETHAMINE 30 MG/ML
15 INJECTION, SOLUTION INTRAMUSCULAR; INTRAVENOUS
Status: COMPLETED | OUTPATIENT
Start: 2023-07-29 | End: 2023-07-29

## 2023-07-29 RX ORDER — TIZANIDINE 4 MG/1
4 TABLET ORAL EVERY 6 HOURS PRN
Qty: 25 TABLET | Refills: 0 | Status: SHIPPED | OUTPATIENT
Start: 2023-07-29 | End: 2023-08-05

## 2023-07-29 RX ORDER — LIDOCAINE 50 MG/G
2 PATCH TOPICAL
Status: DISCONTINUED | OUTPATIENT
Start: 2023-07-29 | End: 2023-07-30 | Stop reason: HOSPADM

## 2023-07-29 RX ORDER — MORPHINE SULFATE 4 MG/ML
4 INJECTION, SOLUTION INTRAMUSCULAR; INTRAVENOUS
Status: COMPLETED | OUTPATIENT
Start: 2023-07-29 | End: 2023-07-29

## 2023-07-29 RX ORDER — TIZANIDINE 2 MG/1
4 TABLET ORAL
Status: COMPLETED | OUTPATIENT
Start: 2023-07-29 | End: 2023-07-29

## 2023-07-29 RX ORDER — NAPROXEN 500 MG/1
500 TABLET ORAL 2 TIMES DAILY
Qty: 14 TABLET | Refills: 0 | Status: SHIPPED | OUTPATIENT
Start: 2023-07-29 | End: 2023-08-05

## 2023-07-29 RX ADMIN — MORPHINE SULFATE 4 MG: 4 INJECTION INTRAVENOUS at 09:07

## 2023-07-29 RX ADMIN — TIZANIDINE 4 MG: 2 TABLET ORAL at 09:07

## 2023-07-29 RX ADMIN — LIDOCAINE 2 PATCH: 50 PATCH TOPICAL at 09:07

## 2023-07-29 RX ADMIN — KETOROLAC TROMETHAMINE 15 MG: 30 INJECTION, SOLUTION INTRAMUSCULAR; INTRAVENOUS at 09:07

## 2023-07-29 NOTE — Clinical Note
"Christiano Blackburn" Hesham was seen and treated in our emergency department on 7/29/2023.  He may return to work on 08/02/2023.       If you have any questions or concerns, please don't hesitate to call.      Jorge A Croft, DO"
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
## 2023-08-17 ENCOUNTER — HOSPITAL ENCOUNTER (OUTPATIENT)
Facility: HOSPITAL | Age: 63
Discharge: HOME HEALTH | End: 2023-08-19
Attending: EMERGENCY MEDICINE | Admitting: HOSPITALIST
Payer: MEDICAID

## 2023-08-17 DIAGNOSIS — N17.9 AKI (ACUTE KIDNEY INJURY): ICD-10-CM

## 2023-08-17 DIAGNOSIS — S32.030A COMPRESSION FRACTURE OF L3 VERTEBRA, INITIAL ENCOUNTER: Primary | ICD-10-CM

## 2023-08-17 DIAGNOSIS — R07.9 CHEST PAIN: ICD-10-CM

## 2023-08-17 DIAGNOSIS — T14.90XA TRAUMA: ICD-10-CM
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
## 2023-08-17 LAB
BUN SERPL-MCNC: 26 MG/DL (ref 6–30)
CHLORIDE SERPL-SCNC: 97 MMOL/L (ref 95–110)
CREAT SERPL-MCNC: 1.7 MG/DL (ref 0.5–1.4)
GLUCOSE SERPL-MCNC: 135 MG/DL (ref 70–110)
HCT VFR BLD CALC: 41 %PCV (ref 36–54)
POC IONIZED CALCIUM: 1.15 MMOL/L (ref 1.06–1.42)
POC TCO2 (MEASURED): 26 MMOL/L (ref 23–29)
POCT GLUCOSE: 135 MG/DL (ref 70–110)
POTASSIUM BLD-SCNC: 3.9 MMOL/L (ref 3.5–5.1)
SAMPLE: ABNORMAL
SODIUM BLD-SCNC: 135 MMOL/L (ref 136–145)

## 2023-08-17 PROCEDURE — 96374 THER/PROPH/DIAG INJ IV PUSH: CPT

## 2023-08-17 PROCEDURE — 99285 EMERGENCY DEPT VISIT HI MDM: CPT | Mod: 25

## 2023-08-17 PROCEDURE — 25000003 PHARM REV CODE 250: Performed by: STUDENT IN AN ORGANIZED HEALTH CARE EDUCATION/TRAINING PROGRAM

## 2023-08-17 PROCEDURE — 93010 ELECTROCARDIOGRAM REPORT: CPT | Mod: ,,, | Performed by: INTERNAL MEDICINE

## 2023-08-17 PROCEDURE — 80047 BASIC METABLC PNL IONIZED CA: CPT

## 2023-08-17 PROCEDURE — 93005 ELECTROCARDIOGRAM TRACING: CPT

## 2023-08-17 PROCEDURE — 93010 EKG 12-LEAD: ICD-10-PCS | Mod: ,,, | Performed by: INTERNAL MEDICINE

## 2023-08-17 PROCEDURE — 63600175 PHARM REV CODE 636 W HCPCS: Performed by: STUDENT IN AN ORGANIZED HEALTH CARE EDUCATION/TRAINING PROGRAM

## 2023-08-17 PROCEDURE — 82962 GLUCOSE BLOOD TEST: CPT

## 2023-08-17 RX ORDER — FENTANYL CITRATE 50 UG/ML
50 INJECTION, SOLUTION INTRAMUSCULAR; INTRAVENOUS
Status: COMPLETED | OUTPATIENT
Start: 2023-08-17 | End: 2023-08-17

## 2023-08-17 RX ORDER — METHOCARBAMOL 500 MG/1
500 TABLET, FILM COATED ORAL
Status: COMPLETED | OUTPATIENT
Start: 2023-08-17 | End: 2023-08-17

## 2023-08-17 RX ORDER — ACETAMINOPHEN 325 MG/1
650 TABLET ORAL
Status: COMPLETED | OUTPATIENT
Start: 2023-08-17 | End: 2023-08-17

## 2023-08-17 RX ADMIN — FENTANYL CITRATE 50 MCG: 50 INJECTION INTRAMUSCULAR; INTRAVENOUS at 11:08

## 2023-08-17 RX ADMIN — METHOCARBAMOL 500 MG: 500 TABLET ORAL at 11:08

## 2023-08-17 RX ADMIN — ACETAMINOPHEN 650 MG: 325 TABLET ORAL at 11:08

## 2023-08-17 NOTE — Clinical Note
Diagnosis: ELVIS (acute kidney injury) [[id number]]   Future Attending Provider: SUREKHA VICTOR [[id number]]   Admitting Provider:: SUREKHA VICTOR [[id number]]

## 2023-08-18 PROBLEM — S32.030A COMPRESSION FRACTURE OF L3 VERTEBRA: Status: ACTIVE | Noted: 2023-08-18

## 2023-08-18 PROBLEM — N17.9 AKI (ACUTE KIDNEY INJURY): Status: ACTIVE | Noted: 2023-08-18

## 2023-08-18 LAB
ALBUMIN SERPL BCP-MCNC: 2.9 G/DL (ref 3.5–5.2)
ALP SERPL-CCNC: 128 U/L (ref 55–135)
ALT SERPL W/O P-5'-P-CCNC: 16 U/L (ref 10–44)
ANION GAP SERPL CALC-SCNC: 12 MMOL/L (ref 8–16)
AST SERPL-CCNC: 18 U/L (ref 10–40)
BASOPHILS # BLD AUTO: 0.05 K/UL (ref 0–0.2)
BASOPHILS NFR BLD: 0.3 % (ref 0–1.9)
BILIRUB DIRECT SERPL-MCNC: 0.3 MG/DL (ref 0.1–0.3)
BILIRUB SERPL-MCNC: 0.9 MG/DL (ref 0.1–1)
BILIRUB UR QL STRIP: NEGATIVE
BUN SERPL-MCNC: 22 MG/DL (ref 6–30)
BUN SERPL-MCNC: 24 MG/DL (ref 8–23)
CALCIUM SERPL-MCNC: 8.6 MG/DL (ref 8.7–10.5)
CHLORIDE SERPL-SCNC: 98 MMOL/L (ref 95–110)
CHLORIDE SERPL-SCNC: 98 MMOL/L (ref 95–110)
CLARITY UR REFRACT.AUTO: CLEAR
CO2 SERPL-SCNC: 24 MMOL/L (ref 23–29)
COLOR UR AUTO: YELLOW
CREAT SERPL-MCNC: 1.3 MG/DL (ref 0.5–1.4)
CREAT SERPL-MCNC: 1.6 MG/DL (ref 0.5–1.4)
DIFFERENTIAL METHOD: ABNORMAL
EOSINOPHIL # BLD AUTO: 0.2 K/UL (ref 0–0.5)
EOSINOPHIL NFR BLD: 1.3 % (ref 0–8)
ERYTHROCYTE [DISTWIDTH] IN BLOOD BY AUTOMATED COUNT: 16.4 % (ref 11.5–14.5)
EST. GFR  (NO RACE VARIABLE): 48.1 ML/MIN/1.73 M^2
GLUCOSE SERPL-MCNC: 117 MG/DL (ref 70–110)
GLUCOSE SERPL-MCNC: 133 MG/DL (ref 70–110)
GLUCOSE UR QL STRIP: NEGATIVE
HCT VFR BLD AUTO: 34.8 % (ref 40–54)
HCT VFR BLD CALC: 36 %PCV (ref 36–54)
HCV AB SERPL QL IA: NORMAL
HGB BLD-MCNC: 11 G/DL (ref 14–18)
HGB UR QL STRIP: NEGATIVE
HIV 1+2 AB+HIV1 P24 AG SERPL QL IA: NORMAL
IMM GRANULOCYTES # BLD AUTO: 0.16 K/UL (ref 0–0.04)
IMM GRANULOCYTES NFR BLD AUTO: 1.1 % (ref 0–0.5)
KETONES UR QL STRIP: NEGATIVE
LEUKOCYTE ESTERASE UR QL STRIP: NEGATIVE
LYMPHOCYTES # BLD AUTO: 2.3 K/UL (ref 1–4.8)
LYMPHOCYTES NFR BLD: 15.2 % (ref 18–48)
MCH RBC QN AUTO: 27 PG (ref 27–31)
MCHC RBC AUTO-ENTMCNC: 31.6 G/DL (ref 32–36)
MCV RBC AUTO: 86 FL (ref 82–98)
MONOCYTES # BLD AUTO: 0.9 K/UL (ref 0.3–1)
MONOCYTES NFR BLD: 6.3 % (ref 4–15)
NEUTROPHILS # BLD AUTO: 11.2 K/UL (ref 1.8–7.7)
NEUTROPHILS NFR BLD: 75.8 % (ref 38–73)
NITRITE UR QL STRIP: NEGATIVE
NRBC BLD-RTO: 0 /100 WBC
PH UR STRIP: 5 [PH] (ref 5–8)
PLATELET # BLD AUTO: 313 K/UL (ref 150–450)
PMV BLD AUTO: 9.3 FL (ref 9.2–12.9)
POC IONIZED CALCIUM: 1.15 MMOL/L (ref 1.06–1.42)
POC TCO2 (MEASURED): 24 MMOL/L (ref 23–29)
POCT GLUCOSE: 154 MG/DL (ref 70–110)
POTASSIUM BLD-SCNC: 3.6 MMOL/L (ref 3.5–5.1)
POTASSIUM SERPL-SCNC: 3.8 MMOL/L (ref 3.5–5.1)
PROT SERPL-MCNC: 8 G/DL (ref 6–8.4)
PROT UR QL STRIP: ABNORMAL
RBC # BLD AUTO: 4.07 M/UL (ref 4.6–6.2)
SAMPLE: ABNORMAL
SODIUM BLD-SCNC: 136 MMOL/L (ref 136–145)
SODIUM SERPL-SCNC: 134 MMOL/L (ref 136–145)
SP GR UR STRIP: 1.02 (ref 1–1.03)
URN SPEC COLLECT METH UR: ABNORMAL
WBC # BLD AUTO: 14.77 K/UL (ref 3.9–12.7)

## 2023-08-18 PROCEDURE — 86803 HEPATITIS C AB TEST: CPT | Performed by: PHYSICIAN ASSISTANT

## 2023-08-18 PROCEDURE — 99499 UNLISTED E&M SERVICE: CPT | Mod: ,,, | Performed by: INTERNAL MEDICINE

## 2023-08-18 PROCEDURE — 81003 URINALYSIS AUTO W/O SCOPE: CPT | Performed by: PHYSICIAN ASSISTANT

## 2023-08-18 PROCEDURE — A4216 STERILE WATER/SALINE, 10 ML: HCPCS | Performed by: PHYSICIAN ASSISTANT

## 2023-08-18 PROCEDURE — 99499 NO LOS: ICD-10-PCS | Mod: ,,, | Performed by: INTERNAL MEDICINE

## 2023-08-18 PROCEDURE — 80076 HEPATIC FUNCTION PANEL: CPT | Performed by: STUDENT IN AN ORGANIZED HEALTH CARE EDUCATION/TRAINING PROGRAM

## 2023-08-18 PROCEDURE — 25000003 PHARM REV CODE 250: Performed by: PHYSICIAN ASSISTANT

## 2023-08-18 PROCEDURE — 82962 GLUCOSE BLOOD TEST: CPT

## 2023-08-18 PROCEDURE — 99223 PR INITIAL HOSPITAL CARE,LEVL III: ICD-10-PCS | Mod: ,,, | Performed by: PHYSICIAN ASSISTANT

## 2023-08-18 PROCEDURE — 80048 BASIC METABOLIC PNL TOTAL CA: CPT | Performed by: STUDENT IN AN ORGANIZED HEALTH CARE EDUCATION/TRAINING PROGRAM

## 2023-08-18 PROCEDURE — 99223 1ST HOSP IP/OBS HIGH 75: CPT | Mod: ,,, | Performed by: PHYSICIAN ASSISTANT

## 2023-08-18 PROCEDURE — G0378 HOSPITAL OBSERVATION PER HR: HCPCS

## 2023-08-18 PROCEDURE — 82310 ASSAY OF CALCIUM: CPT | Performed by: STUDENT IN AN ORGANIZED HEALTH CARE EDUCATION/TRAINING PROGRAM

## 2023-08-18 PROCEDURE — 87389 HIV-1 AG W/HIV-1&-2 AB AG IA: CPT | Performed by: PHYSICIAN ASSISTANT

## 2023-08-18 PROCEDURE — 80047 BASIC METABLC PNL IONIZED CA: CPT

## 2023-08-18 PROCEDURE — 96361 HYDRATE IV INFUSION ADD-ON: CPT

## 2023-08-18 PROCEDURE — 63600175 PHARM REV CODE 636 W HCPCS: Performed by: STUDENT IN AN ORGANIZED HEALTH CARE EDUCATION/TRAINING PROGRAM

## 2023-08-18 PROCEDURE — 96375 TX/PRO/DX INJ NEW DRUG ADDON: CPT

## 2023-08-18 PROCEDURE — 85025 COMPLETE CBC W/AUTO DIFF WBC: CPT | Performed by: EMERGENCY MEDICINE

## 2023-08-18 RX ORDER — METFORMIN HYDROCHLORIDE 500 MG/1
1000 TABLET, EXTENDED RELEASE ORAL EVERY MORNING
Status: ON HOLD | COMMUNITY
Start: 2023-08-04 | End: 2023-11-21 | Stop reason: SDUPTHER

## 2023-08-18 RX ORDER — GLUCAGON 1 MG
1 KIT INJECTION
Status: DISCONTINUED | OUTPATIENT
Start: 2023-08-18 | End: 2023-08-19 | Stop reason: HOSPADM

## 2023-08-18 RX ORDER — IBUPROFEN 200 MG
16 TABLET ORAL
Status: DISCONTINUED | OUTPATIENT
Start: 2023-08-18 | End: 2023-08-18

## 2023-08-18 RX ORDER — NALOXONE HCL 0.4 MG/ML
0.02 VIAL (ML) INJECTION
Status: DISCONTINUED | OUTPATIENT
Start: 2023-08-18 | End: 2023-08-19 | Stop reason: HOSPADM

## 2023-08-18 RX ORDER — TALC
6 POWDER (GRAM) TOPICAL NIGHTLY PRN
Status: DISCONTINUED | OUTPATIENT
Start: 2023-08-18 | End: 2023-08-19 | Stop reason: HOSPADM

## 2023-08-18 RX ORDER — ONDANSETRON 8 MG/1
8 TABLET, ORALLY DISINTEGRATING ORAL EVERY 8 HOURS PRN
Status: DISCONTINUED | OUTPATIENT
Start: 2023-08-18 | End: 2023-08-19 | Stop reason: HOSPADM

## 2023-08-18 RX ORDER — METHOCARBAMOL 500 MG/1
500 TABLET, FILM COATED ORAL 3 TIMES DAILY
Qty: 21 TABLET | Refills: 0 | Status: CANCELLED | OUTPATIENT
Start: 2023-08-18 | End: 2023-08-25

## 2023-08-18 RX ORDER — METHOCARBAMOL 500 MG/1
500 TABLET, FILM COATED ORAL 4 TIMES DAILY
Status: DISCONTINUED | OUTPATIENT
Start: 2023-08-18 | End: 2023-08-19 | Stop reason: HOSPADM

## 2023-08-18 RX ORDER — IBUPROFEN 200 MG
24 TABLET ORAL
Status: DISCONTINUED | OUTPATIENT
Start: 2023-08-18 | End: 2023-08-18

## 2023-08-18 RX ORDER — LIDOCAINE 50 MG/G
1 PATCH TOPICAL
Status: DISCONTINUED | OUTPATIENT
Start: 2023-08-18 | End: 2023-08-19 | Stop reason: HOSPADM

## 2023-08-18 RX ORDER — PROCHLORPERAZINE EDISYLATE 5 MG/ML
5 INJECTION INTRAMUSCULAR; INTRAVENOUS EVERY 6 HOURS PRN
Status: DISCONTINUED | OUTPATIENT
Start: 2023-08-18 | End: 2023-08-19 | Stop reason: HOSPADM

## 2023-08-18 RX ORDER — SODIUM CHLORIDE 0.9 % (FLUSH) 0.9 %
10 SYRINGE (ML) INJECTION EVERY 8 HOURS
Status: DISCONTINUED | OUTPATIENT
Start: 2023-08-18 | End: 2023-08-19 | Stop reason: HOSPADM

## 2023-08-18 RX ORDER — ACETAMINOPHEN 500 MG
1000 TABLET ORAL EVERY 8 HOURS
Status: DISCONTINUED | OUTPATIENT
Start: 2023-08-18 | End: 2023-08-19 | Stop reason: HOSPADM

## 2023-08-18 RX ORDER — MORPHINE SULFATE 4 MG/ML
4 INJECTION, SOLUTION INTRAMUSCULAR; INTRAVENOUS
Status: COMPLETED | OUTPATIENT
Start: 2023-08-18 | End: 2023-08-18

## 2023-08-18 RX ORDER — DEXTROSE 40 %
16 GEL (GRAM) ORAL
Status: DISCONTINUED | OUTPATIENT
Start: 2023-08-18 | End: 2023-08-19 | Stop reason: HOSPADM

## 2023-08-18 RX ORDER — ACETAMINOPHEN 325 MG/1
650 TABLET ORAL EVERY 4 HOURS PRN
Status: DISCONTINUED | OUTPATIENT
Start: 2023-08-18 | End: 2023-08-18

## 2023-08-18 RX ORDER — SIMETHICONE 80 MG
1 TABLET,CHEWABLE ORAL 4 TIMES DAILY PRN
Status: DISCONTINUED | OUTPATIENT
Start: 2023-08-18 | End: 2023-08-19 | Stop reason: HOSPADM

## 2023-08-18 RX ORDER — MAG HYDROX/ALUMINUM HYD/SIMETH 200-200-20
30 SUSPENSION, ORAL (FINAL DOSE FORM) ORAL 4 TIMES DAILY PRN
Status: DISCONTINUED | OUTPATIENT
Start: 2023-08-18 | End: 2023-08-19 | Stop reason: HOSPADM

## 2023-08-18 RX ORDER — CALCITONIN SALMON 200 [IU]/.09ML
1 SPRAY, METERED NASAL
Status: DISPENSED | OUTPATIENT
Start: 2023-08-18 | End: 2023-08-18

## 2023-08-18 RX ORDER — HYDROCODONE BITARTRATE AND ACETAMINOPHEN 7.5; 325 MG/1; MG/1
1 TABLET ORAL DAILY PRN
Status: ON HOLD | COMMUNITY
Start: 2023-08-04 | End: 2023-08-19 | Stop reason: HOSPADM

## 2023-08-18 RX ORDER — DEXTROSE 40 %
24 GEL (GRAM) ORAL
Status: DISCONTINUED | OUTPATIENT
Start: 2023-08-18 | End: 2023-08-19 | Stop reason: HOSPADM

## 2023-08-18 RX ORDER — POLYETHYLENE GLYCOL 3350 17 G/17G
17 POWDER, FOR SOLUTION ORAL DAILY
Status: DISCONTINUED | OUTPATIENT
Start: 2023-08-18 | End: 2023-08-19 | Stop reason: HOSPADM

## 2023-08-18 RX ORDER — OXYCODONE HYDROCHLORIDE 5 MG/1
5 TABLET ORAL EVERY 6 HOURS PRN
Status: DISCONTINUED | OUTPATIENT
Start: 2023-08-18 | End: 2023-08-19

## 2023-08-18 RX ADMIN — METHOCARBAMOL 500 MG: 500 TABLET ORAL at 09:08

## 2023-08-18 RX ADMIN — METHOCARBAMOL 500 MG: 500 TABLET ORAL at 11:08

## 2023-08-18 RX ADMIN — ACETAMINOPHEN 1000 MG: 500 TABLET ORAL at 09:08

## 2023-08-18 RX ADMIN — SODIUM CHLORIDE, POTASSIUM CHLORIDE, SODIUM LACTATE AND CALCIUM CHLORIDE 1000 ML: 600; 310; 30; 20 INJECTION, SOLUTION INTRAVENOUS at 01:08

## 2023-08-18 RX ADMIN — METHOCARBAMOL 500 MG: 500 TABLET ORAL at 05:08

## 2023-08-18 RX ADMIN — MORPHINE SULFATE 4 MG: 4 INJECTION INTRAVENOUS at 03:08

## 2023-08-18 RX ADMIN — Medication 10 ML: at 01:08

## 2023-08-18 RX ADMIN — OXYCODONE HYDROCHLORIDE 5 MG: 5 TABLET ORAL at 09:08

## 2023-08-18 RX ADMIN — Medication 10 ML: at 10:08

## 2023-08-18 RX ADMIN — ACETAMINOPHEN 1000 MG: 500 TABLET ORAL at 01:08

## 2023-08-18 RX ADMIN — LIDOCAINE 5% 1 PATCH: 700 PATCH TOPICAL at 11:08

## 2023-08-18 NOTE — DISCHARGE INSTRUCTIONS
Home Care Instructions:  - Medications: Continue taking your home medications as prescribed  - For pain: Alternate every 3 hours between 500mg Tylenol and 400mg Ibuprofen.   - Take 500mg Robaxin (muscle relaxant) three times daily for the next 7 days.    Follow-Up Plan:  - Follow-up with: Primary care doctor   - Additional testing and/or evaluation will be directed by your primary doctor    Return to the Emergency Department for symptoms including but not limited to: worsening symptoms, severe back pain, shortness of breath or chest pain, vomiting with inability to hold down fluids, blood in vomit or poop, fevers greater than 100.4°F, passing out/fainting/unconsciousness, or other concerning symptoms.

## 2023-08-18 NOTE — ED NOTES
Pt resting at this time, opens eyes spontaneously when name called by RN, pt in hospital gown, on cardiac monitor, belongings at bedside with wife, side rails up x2, call light in reach, white board updated, urinal at bedside. Pt encouraged to call for assistance if needed. Care on going

## 2023-08-18 NOTE — ED NOTES
SHENG Whyte and TESFAYE Stewart at bedside. Patient and patient's spouse informed at length regarding standards of care and expectations pertaining to patient's care during remainder of hospital stay. SOD informed spouse of being able to follow up with SOD at noon for update on patient's POC. Spouse verbalized understanding and expressed apologies to staff.
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
## 2023-08-18 NOTE — PLAN OF CARE
[facility] - Emergency Dept  Initial Discharge Assessment       Primary Care Provider: Jaci Newton MD    Admission Diagnosis: ELVIS (acute kidney injury) [N17.9]    Admission Date: 8/17/2023  Expected Discharge Date: 8/18/2023    Pt stated he is independent with his ADL's and ambulation and does not require assistance or equipment    Pt to d/c home with no needs when ready    Transition of Care Barriers: (P) None    Payor: MEDICAID / Plan: [facility] CONNECT / Product Type: Managed Medicaid /     Extended Emergency Contact Information  Primary Emergency Contact: SAQIB SIMEON  Mobile Phone: [phone]  Relation: Significant other  Preferred language: English   needed? No    Discharge Plan A: (P) Home  Discharge Plan B: (P) Home      [id number] - [place], [place] - [address] AT [facility] [address]  [address]  [place] [address]  Phone: [phone] Fax: [phone]    Dr Fuentes [place], [place] - [address] [place] [address]  [address] [facility] [address]  [facility] [address]  Phone: [phone] Fax: [phone]    [id number] - [place], [place] - [address]  [address]  [place] [address]  Phone: [phone] Fax: [phone]      Initial Assessment (most recent)       Adult Discharge Assessment - 08/18/23 0921          Discharge Assessment    Assessment Type Discharge Planning Assessment (P)      Confirmed/corrected address, phone number and insurance Yes (P)      Confirmed Demographics Correct on Facesheet (P)      Source of Information patient (P)      People in Home significant other (P)      Facility Arrived From: home (P)      Do you expect to return to your current living situation? Yes (P)      Do you have help at home or someone to help you manage your care at home? No (P)      Prior to hospitilization cognitive status: Alert/Oriented;No Deficits (P)      Current cognitive status: Alert/Oriented;No  Deficits (P)      Home Accessibility stairs to enter home (P)      Number of Stairs, Main Entrance six (P)      Home Layout Able to live on 1st floor (P)      Equipment Currently Used at Home blood pressure machine;glucometer (P)      Patient currently being followed by outpatient case management? No (P)      Do you currently have service(s) that help you manage your care at home? No (P)      Do you have any problems affording any of your prescribed medications? No (P)      Is the patient taking medications as prescribed? yes (P)      Who is going to help you get home at discharge? family/friends (P)      How do you get to doctors appointments? car, drives self (P)      Are you on dialysis? No (P)      Do you take coumadin? No (P)      DME Needed Upon Discharge  none (P)      Discharge Plan discussed with: Patient (P)      Transition of Care Barriers None (P)      Discharge Plan A Home (P)      Discharge Plan B Home (P)         OTHER    Name(s) of People in Home significant other Latangia (P)                       Kat Lopez, RON, MSW, LMSW, RSW   Case Management  [facility]  Email: [email or web address]

## 2023-08-18 NOTE — ED NOTES
I-STAT Chem-8+ Results:   Value Reference Range   Sodium 135 136-145 mmol/L   Potassium  3.9 3.5-5.1 mmol/L   Chloride 97  mmol/L   Ionized Calcium 1.15 1.06-1.42 mmol/L   CO2 (measured) 26 23-29 mmol/L   Glucose 135  mg/dL   BUN 26 6-30 mg/dL   Creatinine 1.7 0.5-1.4 mg/dL   Hematocrit 41 36-54%
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
## 2023-08-18 NOTE — ED NOTES
Care assumed from SERINA Enciso    APPEARANCE: odorous. sleeping with easy arousal to voice. Appears to be in moderate discomfort. Pain score 10/10. Bed in lowest and locked position w/ side rails up x2. Call light w/n pt reach and instructed on how to use.   SKIN: warm, dry and intact. No visible breakdown or bruising.  MUSCULOSKELETAL: +generalized weakness. Patient moving all extremities spontaneously, no obvious swelling or deformities noted. +lumbar frx, back pain.  RESPIRATORY: Airway open and patent. Denies shortness of breath.Respirations even, unlabored, equal bilaterally on inspiration and expiration. On RA.  CARDIAC: Regular HR. Denies CP, 2+ distal pulses bilaterally; no peripheral edema  ABDOMEN: S/ND/NT, Denies N/V/D. Pt reports no change in appetite.   : Pt voids spontaneously, denies dysuria, hematuria, urinary frequency, or incontinence.   NEUROLOGIC: AAO x 4; dozing off mid-sentence. Follows commands appropriately. Equal strength in all extremities; denies numbness/tingling. Denies dizziness, lightheadedness, visual changes, or HA.      Repositioned in bed for pt comfort and provided with clean linens. Care ongoing.

## 2023-08-18 NOTE — ASSESSMENT & PLAN NOTE
Patient with acute kidney injury/acute renal failure likely due to pre-renal azotemia due to dehydration ELVIS is currently improving. Baseline creatinine 1.3 - Labs reviewed- Renal function/electrolytes with CrCl cannot be calculated (Unknown ideal weight.). according to latest data. Monitor urine output and serial BMP and adjust therapy as needed. Avoid nephrotoxins and renally dose meds for GFR listed above.     -patient educated on importance of staying hydrated  -adequate urine output assessed  resolved
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
## 2023-08-18 NOTE — ASSESSMENT & PLAN NOTE
-patient 5/5 strength bilateral lower extremities, denies any new onset weakness or numbness or tingling sensation   -Xray tib/fib, wrist, pelvis, ankle, foot, knee no acute findings  -Xray Lumbar Spine noted New compression fractures at L1 through L3 vertebral bodies. Correlate with MRI lumbar spine  -Xray chest noted Increasing perihilar opacities and Left ventricular configuration of the heart.   -CT head no acute findings   -CT spine with contrast Multilevel compression fractures involving L1 through L3 vertebral bodies associated with mild anterior displacement of the anterosuperior endplates, and fracture involvement of the superior and inferior endplates at L3.  These are new compared to recent 07/29/2023 CT.  No significant fracture fragment retropulsion or posterior element involvement. Degenerative changes throughout the spine as described above, most significantly contributing to severe left neural foraminal narrowing at C3-C4.  No high-grade spinal canal stenosis.   -MRI results noted Partial lumbarization of S1 and rudimentary ribs at L1 noted, normal variant. L1-L3 compression fractures  -orthospine consulted, recommeneded TLSO brace for comfort, Intranasal calcitonin, and MM pain reigmen  -LSO brace being brought down to the patient 08/18  -PT/OT consulted, pending recs  -upon discharge orthospine follow up
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
## 2023-08-18 NOTE — ED NOTES
Martha with DME at bedside for delivery and application of LSO back brace. Pt currently JOSEMANUEL in Xray. This nurse will inform Martha of when patient returns to room.
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
## 2023-08-18 NOTE — SUBJECTIVE & OBJECTIVE
Past Medical History:   Diagnosis Date    C. difficile colitis 2013    hospitalized for 9 days    Diabetes mellitus     Diverticulitis 2014    required hospitalization    Hypertension     Pneumonia 2014       Past Surgical History:   Procedure Laterality Date    ABCESS DRAINAGE  03/2016    perineum       Review of patient's allergies indicates:  No Known Allergies    Current Facility-Administered Medications   Medication    calcitonin (salmon) nasal spray 1 spray     Current Outpatient Medications   Medication Sig    chlorthalidone (HYGROTEN) 50 MG Tab TAKE 1 TABLET(50 MG) BY MOUTH EVERY DAY    clindamycin (CLEOCIN T) 1 % lotion Apply topically 2 (two) times daily. For hidradenitis (Patient not taking: Reported on 5/20/2020)    CONTOUR TEST STRIPS Strp Use to check blood sugar once daily    ergocalciferol (ERGOCALCIFEROL) 50,000 unit Cap Take 1 capsule (50,000 Units total) by mouth every 7 days. For vitamin D deficiency (Patient not taking: Reported on 5/20/2020)    losartan (COZAAR) 100 MG tablet TAKE 1 TABLET(100 MG) BY MOUTH EVERY DAY FOR BLOOD PRESSURE    mupirocin (BACTROBAN) 2 % ointment Apply topically once daily. (Patient not taking: Reported on 5/20/2020)    mupirocin (BACTROBAN) 2 % ointment Apply to affected area 3 times daily    sildenafiL (VIAGRA) 100 MG tablet TAKE ONE TABLET BY MOUTH 30 MINUTES PRIOR TO INTERCOURSE     Family History       Problem Relation (Age of Onset)    Cancer Paternal Grandmother    Diabetes Father, Paternal Grandfather, Other    Heart disease Mother          Tobacco Use    Smoking status: Every Day     Current packs/day: 1.00     Average packs/day: 1 pack/day for 30.0 years (30.0 ttl pk-yrs)     Types: Cigarettes    Smokeless tobacco: Never    Tobacco comments:     30 years, has quit a few times in the past   Substance and Sexual Activity    Alcohol use: Yes     Comment: soc    Drug use: Not on file     Comment: MJ daily    Sexual activity: Yes     Partners: Female     Comment:  in a relationship     ROS  Constitutional: negative for fevers  Eyes: negative visual changes  ENT: negative for hearing loss  Respiratory: negative for dyspnea  Cardiovascular: negative for chest pain  Gastrointestinal: negative for abdominal pain  Genitourinary: negative for dysuria  Neurological: negative for headaches  Behavioral/Psych: negative for hallucinations  Endocrine: negative for temperature intolerance    Objective:     Vital Signs (Most Recent):  Temp: 98.1 °F (36.7 °C) (08/17/23 2202)  Pulse: 78 (08/18/23 0332)  Resp: 18 (08/18/23 0350)  BP: (!) 143/82 (08/18/23 0402)  SpO2: 95 % (08/18/23 0332) Vital Signs (24h Range):  Temp:  [98.1 °F (36.7 °C)] 98.1 °F (36.7 °C)  Pulse:  [78-81] 78  Resp:  [17-19] 18  SpO2:  [95 %-97 %] 95 %  BP: (125-143)/(61-94) 143/82           There is no height or weight on file to calculate BMI.      Intake/Output Summary (Last 24 hours) at 8/18/2023 0609  Last data filed at 8/18/2023 0304  Gross per 24 hour   Intake 999.11 ml   Output --   Net 999.11 ml        Ortho/SPM Exam  General:  no acute distress, appears stated age   Neuro: alert and oriented x3  Psych: normal mood  Head: normocephalic, atraumatic.  Eyes: no scleral icterus  Mouth: moist mucous membranes  Cardiovascular: extremities warm and well perfused  Lungs: breathing comfortably, equal chest rise bilat  Skin: clean, dry, intact (any exceptions noted in below musculoskeletal exam)    MSK:  RUE:  - Skin intact throughout, no open wounds  - No swelling  - No ecchymosis, erythema, or signs of cellulitis  - NonTTP throughout  - AROM and PROM of the shoulder, elbow, wrist, and hand intact without pain  - Axillary/AIN/PIN/Radial/Median/Ulnar Nerves assessed in isolation without deficit  - SILT throughout  - Compartments soft  - Radial artery palpated   - Capillary Refill <3s    LUE:  - Skin intact throughout, no open wounds  - No swelling  - No ecchymosis, erythema, or signs of cellulitis  - NonTTP throughout  - AROM  and PROM of the shoulder, elbow, wrist, and hand intact without pain  - Axillary/AIN/PIN/Radial/Median/Ulnar Nerves assessed in isolation without deficit  - SILT throughout  - Compartments soft  - Radial artery palpated   - Capillary Refill <3s    RLE:  - Skin intact throughout, no open wounds  - No swelling  - No ecchymosis, erythema, or signs of cellulitis  - NonTTP throughout  - AROM and PROM of the hip, knee, ankle, and foot intact without pain  - TA/EHL/Gastroc/FHL assessed in isolation without deficit  - SILT throughout  - Compartments soft  - DP and PT palpated  - Capillary Refill <3s  - Negative Log roll  - Negative [facility]    LLE:  - Skin intact throughout, no open wounds  - No swelling  - No ecchymosis, erythema, or signs of cellulitis  - NonTTP throughout  - AROM and PROM of the hip, knee, ankle, and foot intact without pain  - TA/EHL/Gastroc/FHL assessed in isolation without deficit  - SILT throughout  - Compartments soft  - DP and PT palpated  - Capillary Refill <3s  - Negative Log roll  - Negative [facility] includes the [facility]      Mild TTP throughout C/T spine, severe TTP at L spine    Motor            RIGHT  LEFT  Deltoid(C5)        5/5    5/5  Biceps(C5)         5/5    5/5  Brachioradialis(C6)       5/5    5/5   Extensor Carpi Radialis Longus(C6)    5/5    5/5   Triceps(C7)       5/5    5/5     Flexor Carpi Radialis(C7)     5/5    5/5  Flexor Digitorum Superficialis(C8)    5/5    5/5  Interossei(T1)       5/5    5/5     Hip Flexion (L3)                                     4/5                  4/5  Knee Extension (L4)                              5/5                  5/5  Tib Ant (L5)                                            5/5                  5/5       EHL (L5)                                                 5/5                  5/5  Gastrocs (S1)                                         5/5                  5/5  Peroneals (S1)                                       5/5                  5/5       FHL (S2)                                                 5/5                  5/5    Sensation   All dermatomes (C1-S5) normal    Reflexes       RIGHT  LEFT  Triceps           2+     2+  Biceps           2+     2+  Brachioradialis          2+           2+  Patella        2+     2+  Achilles       2+     2+  Hoffmans's        Neg    Neg  Babinski      Neg    Neg   Clonus       Neg    Neg     Pulses       RIGHT  LEFT  Radial        2+     2+  Dorsalis Pedis       2+     2+  Post Tib       2+     2+           Significant Labs: All pertinent labs within the past 24 hours have been reviewed.    Significant Imaging: I have reviewed and interpreted all pertinent imaging results/findings.  CT of entire spine: L1-3 compression fractures
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
## 2023-08-18 NOTE — ED NOTES
Spouse returns to pt bedside at this time. Spouse repeatedly asking to speaking with charge nurse regarding bed assignment and food tray. This nurse informed spouse of pt being NPO at this time pending NSGY consult and completion of imaging. Charge nurse made aware.

## 2023-08-18 NOTE — PLAN OF CARE
AAOx4. Pt orient to the room upon arrival. No complaints of pain. Pt refused bed alarm. Pt instructed not to get out of bed without assistance. Call light in reach.      Problem: Adult Inpatient Plan of Care  Goal: Plan of Care Review  Outcome: Ongoing, Progressing  Goal: Optimal Comfort and Wellbeing  Outcome: Ongoing, Progressing     Problem: Infection  Goal: Absence of Infection Signs and Symptoms  Outcome: Ongoing, Progressing     Problem: Diabetes Comorbidity  Goal: Blood Glucose Level Within Targeted Range  Outcome: Ongoing, Progressing     Problem: Fluid and Electrolyte Imbalance (Acute Kidney Injury/Impairment)  Goal: Fluid and Electrolyte Balance  Outcome: Ongoing, Progressing     Problem: Oral Intake Inadequate (Acute Kidney Injury/Impairment)  Goal: Optimal Nutrition Intake  Outcome: Ongoing, Progressing     Problem: Renal Function Impairment (Acute Kidney Injury/Impairment)  Goal: Effective Renal Function  Outcome: Ongoing, Progressing     Problem: Hypertension Comorbidity  Goal: Blood Pressure in Desired Range  Outcome: Ongoing, Progressing     Problem: Fracture Stabilization and Management (Orthopaedic Fracture)  Goal: Fracture Stability  Outcome: Ongoing, Progressing

## 2023-08-18 NOTE — ASSESSMENT & PLAN NOTE
Patient with acute kidney injury/acute renal failure likely due to pre-renal azotemia due to dehydration ELVIS is currently improving. Baseline creatinine 1.3 - Labs reviewed- Renal function/electrolytes with CrCl cannot be calculated (Unknown ideal weight.). according to latest data. Monitor urine output and serial BMP and adjust therapy as needed. Avoid nephrotoxins and renally dose meds for GFR listed above.     -patient educated on importance of staying hydrated  -adequate urine output assessed
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
## 2023-08-18 NOTE — ASSESSMENT & PLAN NOTE
Patient's FSGs are uncontrolled due to hyperglycemia on current medication regimen.  Last A1c reviewed-   Lab Results   Component Value Date    HGBA1C 10.9 (H) 06/02/2022     Most recent fingerstick glucose reviewed-   Recent Labs   Lab 08/17/23  2337 08/18/23  0926   POCTGLUCOSE 135* 154*     Current correctional scale  Low  Maintain anti-hyperglycemic dose as follows-   Antihyperglycemics (From admission, onward)    None        Hold Oral hypoglycemics while patient is in the hospital.

## 2023-08-18 NOTE — ED NOTES
APPEARANCE: awake and alert in NAD. Drowsy upon arrival but arouses to voice. Wife at the bedside.   SKIN: warm, dry and intact. No breakdown or bruising.   MUSCULOSKELETAL: Patient moving all extremities spontaneously, no obvious swelling or deformities noted. Ambulates independently.  RESPIRATORY: Denies shortness of breath.Respirations unlabored.   CARDIAC: Denies CP, 2+ distal pulses; no peripheral edema  ABDOMEN: S/ND/NT, Denies nausea  : voids spontaneously, denies difficulty  Neurologic: AAO x 4; follows commands equal strength in all extremities; denies numbness/tingling. Denies dizziness ***

## 2023-08-18 NOTE — ED NOTES
Bed: 17  Expected date: 8/17/23  Expected time: 9:50 PM  Means of arrival:   Comments:  Sinan when arrived

## 2023-08-18 NOTE — ED NOTES
"Nurse at bedside for pt assessment and evaluation. Pt's spouse at bedside continuously impeding patient care stating, "do not wake him. He needs to sleep, and you talk too loud. I want him to have a new nurse." This nurse informed spouse of needing to appropriately assess patient for proper continuity of care and management of pt's acute status. Pt's spouse proceeds to speak about this nurse inappropriately to remainder of staff and approach nurses station unsolicited. Charge nurse made aware at this time. Care ongoing.   "

## 2023-08-19 VITALS
DIASTOLIC BLOOD PRESSURE: 58 MMHG | RESPIRATION RATE: 20 BRPM | TEMPERATURE: 99 F | WEIGHT: 236.31 LBS | OXYGEN SATURATION: 91 % | BODY MASS INDEX: 37.09 KG/M2 | SYSTOLIC BLOOD PRESSURE: 129 MMHG | HEART RATE: 73 BPM | HEIGHT: 67 IN

## 2023-08-19 PROBLEM — N17.9 AKI (ACUTE KIDNEY INJURY): Status: RESOLVED | Noted: 2023-08-18 | Resolved: 2023-08-19

## 2023-08-19 LAB
ANION GAP SERPL CALC-SCNC: 13 MMOL/L (ref 8–16)
BASOPHILS # BLD AUTO: 0.08 K/UL (ref 0–0.2)
BASOPHILS NFR BLD: 0.5 % (ref 0–1.9)
BUN SERPL-MCNC: 14 MG/DL (ref 8–23)
CALCIUM SERPL-MCNC: 8.9 MG/DL (ref 8.7–10.5)
CHLORIDE SERPL-SCNC: 100 MMOL/L (ref 95–110)
CO2 SERPL-SCNC: 23 MMOL/L (ref 23–29)
CREAT SERPL-MCNC: 0.9 MG/DL (ref 0.5–1.4)
DIFFERENTIAL METHOD: ABNORMAL
EOSINOPHIL # BLD AUTO: 0.2 K/UL (ref 0–0.5)
EOSINOPHIL NFR BLD: 1.5 % (ref 0–8)
ERYTHROCYTE [DISTWIDTH] IN BLOOD BY AUTOMATED COUNT: 16.6 % (ref 11.5–14.5)
EST. GFR  (NO RACE VARIABLE): >60 ML/MIN/1.73 M^2
GLUCOSE SERPL-MCNC: 92 MG/DL (ref 70–110)
HCT VFR BLD AUTO: 37.6 % (ref 40–54)
HGB BLD-MCNC: 11.8 G/DL (ref 14–18)
IMM GRANULOCYTES # BLD AUTO: 0.1 K/UL (ref 0–0.04)
IMM GRANULOCYTES NFR BLD AUTO: 0.6 % (ref 0–0.5)
LYMPHOCYTES # BLD AUTO: 2.5 K/UL (ref 1–4.8)
LYMPHOCYTES NFR BLD: 15.1 % (ref 18–48)
MAGNESIUM SERPL-MCNC: 1.7 MG/DL (ref 1.6–2.6)
MCH RBC QN AUTO: 26.7 PG (ref 27–31)
MCHC RBC AUTO-ENTMCNC: 31.4 G/DL (ref 32–36)
MCV RBC AUTO: 85 FL (ref 82–98)
MONOCYTES # BLD AUTO: 1.2 K/UL (ref 0.3–1)
MONOCYTES NFR BLD: 7.1 % (ref 4–15)
NEUTROPHILS # BLD AUTO: 12.2 K/UL (ref 1.8–7.7)
NEUTROPHILS NFR BLD: 75.2 % (ref 38–73)
NRBC BLD-RTO: 0 /100 WBC
PLATELET # BLD AUTO: 342 K/UL (ref 150–450)
PMV BLD AUTO: 9.7 FL (ref 9.2–12.9)
POTASSIUM SERPL-SCNC: 3.5 MMOL/L (ref 3.5–5.1)
RBC # BLD AUTO: 4.42 M/UL (ref 4.6–6.2)
SODIUM SERPL-SCNC: 136 MMOL/L (ref 136–145)
WBC # BLD AUTO: 16.25 K/UL (ref 3.9–12.7)

## 2023-08-19 PROCEDURE — 25000003 PHARM REV CODE 250: Performed by: PHYSICIAN ASSISTANT

## 2023-08-19 PROCEDURE — 99499 UNLISTED E&M SERVICE: CPT | Mod: ,,, | Performed by: INTERNAL MEDICINE

## 2023-08-19 PROCEDURE — A4216 STERILE WATER/SALINE, 10 ML: HCPCS | Performed by: PHYSICIAN ASSISTANT

## 2023-08-19 PROCEDURE — 36415 COLL VENOUS BLD VENIPUNCTURE: CPT | Performed by: PHYSICIAN ASSISTANT

## 2023-08-19 PROCEDURE — 97165 OT EVAL LOW COMPLEX 30 MIN: CPT

## 2023-08-19 PROCEDURE — 99499 NO LOS: ICD-10-PCS | Mod: ,,, | Performed by: INTERNAL MEDICINE

## 2023-08-19 PROCEDURE — 99233 PR SUBSEQUENT HOSPITAL CARE,LEVL III: ICD-10-PCS | Mod: ,,, | Performed by: PHYSICIAN ASSISTANT

## 2023-08-19 PROCEDURE — 97535 SELF CARE MNGMENT TRAINING: CPT

## 2023-08-19 PROCEDURE — 83735 ASSAY OF MAGNESIUM: CPT | Performed by: PHYSICIAN ASSISTANT

## 2023-08-19 PROCEDURE — G0378 HOSPITAL OBSERVATION PER HR: HCPCS

## 2023-08-19 PROCEDURE — 97116 GAIT TRAINING THERAPY: CPT

## 2023-08-19 PROCEDURE — 99233 SBSQ HOSP IP/OBS HIGH 50: CPT | Mod: ,,, | Performed by: PHYSICIAN ASSISTANT

## 2023-08-19 PROCEDURE — 80048 BASIC METABOLIC PNL TOTAL CA: CPT | Performed by: PHYSICIAN ASSISTANT

## 2023-08-19 PROCEDURE — 97161 PT EVAL LOW COMPLEX 20 MIN: CPT

## 2023-08-19 PROCEDURE — 85025 COMPLETE CBC W/AUTO DIFF WBC: CPT | Performed by: PHYSICIAN ASSISTANT

## 2023-08-19 RX ORDER — METHOCARBAMOL 500 MG/1
500 TABLET, FILM COATED ORAL 4 TIMES DAILY
Qty: 40 TABLET | Refills: 0 | Status: SHIPPED | OUTPATIENT
Start: 2023-08-19 | End: 2023-08-29

## 2023-08-19 RX ORDER — ACETAMINOPHEN 500 MG
1000 TABLET ORAL EVERY 8 HOURS PRN
Qty: 30 TABLET | Refills: 0
Start: 2023-08-19

## 2023-08-19 RX ORDER — OXYCODONE HYDROCHLORIDE 10 MG/1
10 TABLET ORAL EVERY 8 HOURS PRN
Qty: 15 TABLET | Refills: 0 | Status: SHIPPED | OUTPATIENT
Start: 2023-08-19 | End: 2023-10-27 | Stop reason: ALTCHOICE

## 2023-08-19 RX ORDER — LIDOCAINE 50 MG/G
1 PATCH TOPICAL DAILY
Qty: 15 PATCH | Refills: 0 | Status: SHIPPED | OUTPATIENT
Start: 2023-08-19

## 2023-08-19 RX ORDER — OXYCODONE HYDROCHLORIDE 10 MG/1
10 TABLET ORAL EVERY 6 HOURS PRN
Status: DISCONTINUED | OUTPATIENT
Start: 2023-08-19 | End: 2023-08-19 | Stop reason: HOSPADM

## 2023-08-19 RX ADMIN — METHOCARBAMOL 500 MG: 500 TABLET ORAL at 08:08

## 2023-08-19 RX ADMIN — POLYETHYLENE GLYCOL 3350 17 G: 17 POWDER, FOR SOLUTION ORAL at 08:08

## 2023-08-19 RX ADMIN — METHOCARBAMOL 500 MG: 500 TABLET ORAL at 01:08

## 2023-08-19 RX ADMIN — ACETAMINOPHEN 1000 MG: 500 TABLET ORAL at 01:08

## 2023-08-19 RX ADMIN — Medication 10 ML: at 06:08

## 2023-08-19 RX ADMIN — ACETAMINOPHEN 1000 MG: 500 TABLET ORAL at 05:08

## 2023-08-19 RX ADMIN — LIDOCAINE 5% 1 PATCH: 700 PATCH TOPICAL at 08:08

## 2023-08-19 RX ADMIN — OXYCODONE HYDROCHLORIDE 10 MG: 10 TABLET ORAL at 01:08

## 2023-08-19 RX ADMIN — OXYCODONE HYDROCHLORIDE 5 MG: 5 TABLET ORAL at 05:08

## 2023-08-19 NOTE — PLAN OF CARE
CM noted discharge order. [facility] referrals sent to 8 in-network  agencies. Will follow-up on acceptance Sun 8/20/23.      Radha Austin RN  Weekend  - [facility] [address]  Spectralink: [phone]

## 2023-08-19 NOTE — SUBJECTIVE & OBJECTIVE
Interval History: NAEON. Pt seen and evaluated at bedside this am. Pt is complaining of 9/10 back pain today, will increase pain regimen. PTOT assigned today, appreciate recs. Continue to monitor. Pt likely to discharge when pain controlled if no post acute placement necessary.    Review of Systems   Constitutional:  Negative for activity change, chills and fever.   HENT:  Negative for trouble swallowing.    Eyes:  Negative for photophobia and visual disturbance.   Respiratory:  Negative for chest tightness, shortness of breath and wheezing.    Cardiovascular:  Negative for chest pain, palpitations and leg swelling.   Gastrointestinal:  Negative for abdominal pain, constipation, diarrhea, nausea and vomiting.   Genitourinary:  Negative for dysuria, frequency, hematuria and urgency.   Musculoskeletal:  Positive for back pain (Lumbar area). Negative for arthralgias, gait problem and neck pain.   Skin:  Negative for color change and rash.   Neurological:  Negative for dizziness, syncope, weakness, light-headedness, numbness and headaches.   Psychiatric/Behavioral:  Negative for agitation and confusion. The patient is not nervous/anxious.      Objective:     Vital Signs (Most Recent):  Temp: 99.4 °F (37.4 °C) (08/19/23 0552)  Pulse: 73 (08/19/23 0552)  Resp: 18 (08/19/23 0552)  BP: (!) 129/58 (08/19/23 0552)  SpO2: (!) 91 % (08/19/23 0552) Vital Signs (24h Range):  Temp:  [97.8 °F (36.6 °C)-100.1 °F (37.8 °C)] 99.4 °F (37.4 °C)  Pulse:  [66-80] 73  Resp:  [16-20] 18  SpO2:  [91 %-96 %] 91 %  BP: (118-138)/(58-77) 129/58     Weight: 107.2 kg (236 lb 5.3 oz)  Body mass index is 37.02 kg/m².  No intake or output data in the 24 hours ending 08/19/23 1300      Physical Exam  Vitals and nursing note reviewed.   Constitutional:       General: He is not in acute distress.     Appearance: He is well-developed. He is obese.      Comments:      HENT:      Head: Normocephalic and atraumatic.      Mouth/Throat:      Pharynx: No  oropharyngeal exudate.   Eyes:      Extraocular Movements: Extraocular movements intact.      Conjunctiva/sclera: Conjunctivae normal.      Pupils: Pupils are equal, round, and reactive to light.   Cardiovascular:      Rate and Rhythm: Normal rate.      Heart sounds: Normal heart sounds.   Pulmonary:      Effort: Pulmonary effort is normal. No respiratory distress.      Breath sounds: Normal breath sounds. No wheezing.   Abdominal:      General: Bowel sounds are normal. There is no distension.      Palpations: Abdomen is soft.      Tenderness: There is no abdominal tenderness.   Musculoskeletal:         General: Normal range of motion.      Cervical back: Normal range of motion and neck supple.      Lumbar back: Tenderness present.   Lymphadenopathy:      Cervical: No cervical adenopathy.   Skin:     General: Skin is warm and dry.      Capillary Refill: Capillary refill takes less than 2 seconds.      Findings: No rash.   Neurological:      General: No focal deficit present.      Mental Status: He is oriented to person, place, and time.      Cranial Nerves: No cranial nerve deficit.      Sensory: No sensory deficit.      Motor: No weakness.      Coordination: Coordination normal.   Psychiatric:         Behavior: Behavior normal.         Thought Content: Thought content normal.         Judgment: Judgment normal.             Significant Labs: All pertinent labs within the past 24 hours have been reviewed.    Significant Imaging: I have reviewed all pertinent imaging results/findings within the past 24 hours.
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
## 2023-08-19 NOTE — PLAN OF CARE
Plan of care review with patient. Verbalized understanding. C/o back pain is being managed by PRN Oxycodone See MAR. Bed alarm on due to weakness. Patient use urinal during the night. Orthopedic vest is at bedside. No acute distress noted.     Problem: Diabetes Comorbidity  Goal: Blood Glucose Level Within Targeted Range  Outcome: Ongoing, Progressing        Problem: Adult Inpatient Plan of Care  Goal: Plan of Care Review  Outcome: Ongoing, Progressing     Problem: Infection  Goal: Absence of Infection Signs and Symptoms  Outcome: Ongoing, Progressing     Problem: Hypertension Comorbidity  Goal: Blood Pressure in Desired Range  Outcome: Ongoing, Progressing     Problem: Pain (Orthopaedic Fracture)  Goal: Acceptable Pain Control  Outcome: Ongoing, Progressing     Problem: Fall Injury Risk  Goal: Absence of Fall and Fall-Related Injury  Outcome: Ongoing, Progressing

## 2023-08-19 NOTE — PLAN OF CARE
[facility] - [facility]      HOME HEALTH ORDERS  FACE TO FACE ENCOUNTER    Patient Name: Christiano Dahl  YOB: 1960    PCP: Jaci Newton MD   PCP Address: [address] [place] [address]  PCP Phone Number: [phone]  PCP Fax: [phone]    Encounter Date: 8/17/23    Admit to Home Health    Diagnoses:  Active Hospital Problems    Diagnosis  POA    *Multiple compression fractures of the lumbar spine [S32.030A]  Yes     Priority: 1 - High    Controlled type 2 diabetes mellitus without complication, without long-term current use of insulin [E11.9]  Yes    Leukocytosis [D72.829]  Yes    Essential hypertension [I10]  Yes     Chronic    Cigarette nicotine dependence without complication [F17.210]  Yes     Chronic      Resolved Hospital Problems    Diagnosis Date Resolved POA    ELVIS (acute kidney injury) [N17.9] 08/19/2023 Yes     Priority: 2        Follow Up Appointments:  No future appointments.    Allergies:Review of patient's allergies indicates:  No Known Allergies    Medications: Review discharge medications with patient and family and provide education.    Current Facility-Administered Medications   Medication Dose Route Frequency Provider Last Rate Last Admin    acetaminophen tablet 1,000 mg  1,000 mg Oral Q8H Nayely Shepard PA-RADHA   1,000 mg at 08/19/23 1315    aluminum-magnesium hydroxide-simethicone 200-200-20 mg/5 mL suspension 30 mL  30 mL Oral QID PRN Nayely Shepard PA-C        dextrose 40 % gel 16,000 mg  16 g Oral PRN Oneil Myers MD        dextrose 40 % gel 24,000 mg  24 g Oral PRN Oneil Myers MD        glucagon (human recombinant) injection 1 mg  1 mg Intramuscular PRN Nayely Shepard PA-C        LIDOcaine 5 % patch 1 patch  1 patch Transdermal Q24H Nayely Shepard PA-C   1 patch at 08/19/23 0832    melatonin tablet 6 mg  6 mg Oral Nightly PRN Nayely Shepard PA-C        methocarbamoL tablet 500 mg  500 mg Oral QID Nayely Shepard  JOHNY   500 mg at 08/19/23 1315    naloxone 0.4 mg/mL injection 0.02 mg  0.02 mg Intravenous PRN Nayely Shepard PA-C        ondansetron disintegrating tablet 8 mg  8 mg Oral Q8H PRN Nayely Shepard PA-C        oxyCODONE immediate release tablet Tab 10 mg  10 mg Oral Q6H PRN Nayely Shepard PA-C   10 mg at 08/19/23 1315    polyethylene glycol packet 17 g  17 g Oral Daily Nayely Shepard PA-C   17 g at 08/19/23 0802    prochlorperazine injection Soln 5 mg  5 mg Intravenous Q6H PRN Nayely Shepard PA-C        simethicone chewable tablet 80 mg  1 tablet Oral QID PRN Nayely Shepard PA-C        sodium chloride 0.9% flush 10 mL  10 mL Intravenous Q8H Nayely Shepard PA-C   10 mL at 08/19/23 0600     Current Discharge Medication List        START taking these medications    Details   acetaminophen (TYLENOL) 500 MG tablet Take 2 tablets (1,000 mg total) by mouth every 8 (eight) hours as needed for Pain.  Qty: 30 tablet, Refills: 0      LIDOcaine (LIDODERM) 5 % Place 1 patch onto the skin once daily. Remove & Discard patch within 12 hours or as directed by MD  Qty: 15 patch, Refills: 0      methocarbamoL (ROBAXIN) 500 MG Tab Take 1 tablet (500 mg total) by mouth 4 (four) times daily. for 10 days  Qty: 40 tablet, Refills: 0      oxyCODONE (ROXICODONE) 10 mg Tab immediate release tablet Take 1 tablet (10 mg total) by mouth every 8 (eight) hours as needed for Pain.  Qty: 15 tablet, Refills: 0    Comments: Quantity prescribed more than 7 day supply? No           CONTINUE these medications which have NOT CHANGED    Details   chlorthalidone (HYGROTEN) 50 MG Tab TAKE 1 TABLET(50 MG) BY MOUTH EVERY DAY  Qty: 90 tablet, Refills: 0    Comments: Please inactivate all prior scripts with same name and strength including on holds.  Associated Diagnoses: Essential hypertension      losartan (COZAAR) 100 MG tablet TAKE 1 TABLET(100 MG) BY MOUTH EVERY DAY FOR BLOOD PRESSURE  Qty: 90 tablet,  Refills: 0    Associated Diagnoses: Essential hypertension      metFORMIN (GLUCOPHAGE-XR) 500 MG ER 24hr tablet Take 1,000 mg by mouth every morning.      CONTOUR TEST STRIPS Strp Use to check blood sugar once daily  Qty: 100 each, Refills: 3    Associated Diagnoses: Type 2 diabetes mellitus without complication, without long-term current use of insulin      sildenafiL (VIAGRA) 100 MG tablet TAKE ONE TABLET BY MOUTH 30 MINUTES PRIOR TO INTERCOURSE  Qty: 30 tablet, Refills: 0           STOP taking these medications       HYDROcodone-acetaminophen (NORCO) 7.5-325 mg per tablet Comments:   Reason for Stopping:                 I have seen and examined this patient within the last 30 days. My clinical findings that support the need for the home health skilled services and home bound status are the following:no   Weakness/numbness causing balance and gait disturbance due to Weakness/Debility making it taxing to leave home.     Diet:   regular diet    Labs:  Report Lab results to PCP.    Referrals/ Consults  Physical Therapy to evaluate and treat. Evaluate for home safety and equipment needs; Establish/upgrade home exercise program. Perform / instruct on therapeutic exercises, gait training, transfer training, and Range of Motion.  Occupational Therapy to evaluate and treat. Evaluate home environment for safety and equipment needs. Perform/Instruct on transfers, ADL training, ROM, and therapeutic exercises.    Activities:   activity as tolerated    Nursing:   Agency to admit patient within 24 hours of hospital discharge unless specified on physician order or at patient request    SN to complete comprehensive assessment including routine vital signs. Instruct on disease process and s/s of complications to report to MD. Review/verify medication list sent home with the patient at time of discharge  and instruct patient/caregiver as needed. Frequency may be adjusted depending on start of care date.     Skilled nurse to perform  up to 3 visits PRN for symptoms related to diagnosis    Notify MD if SBP > 160 or < 90; DBP > 90 or < 50; HR > 120 or < 50; Temp > 101; O2 < 88%    Ok to schedule additional visits based on staff availability and patient request on consecutive days within the home health episode.    When multiple disciplines ordered:    Start of Care occurs on Sunday - Wednesday schedule remaining discipline evaluations as ordered on separate consecutive days following the start of care.    Thursday SOC -schedule subsequent evaluations Friday and Monday the following week.     Friday - Saturday SOC - schedule subsequent discipline evaluations on consecutive days starting Monday of the following week.    For all post-discharge communication and subsequent orders please contact patient's primary care physician. If unable to reach primary care physician or do not receive response within 30 minutes, please contact [facility] [place] [place] [facility] for clinical staff order clarification    Home Health Aide:  Physical Therapy Three times weekly and Occupational Therapy Three times weekly    Wound Care Orders  no    I certify that this patient is confined to his home and needs physical therapy and occupational therapy.

## 2023-08-19 NOTE — ASSESSMENT & PLAN NOTE
- WBC elevated on admission 14.77. Chronically elevated on chart review. No further findings infection. Continue to monitor. Will give hematology referral at discharge.

## 2023-08-19 NOTE — PLAN OF CARE
Problem: Physical Therapy  Goal: Physical Therapy Goal  Description: PT goals until 8/25/23    1. Pt supine to sit with spinal prec with mod independent-not met  2. Pt sit to supine with spinal prec with mod independent-not met  3. Pt sit to stand with or without RW as needed for safety with mod independent-not met  4. Pt to perform gait 100ft with RW with supervision.-not met  5. Pt to go up/down 5 steps with B UE rails with supervision.-not met  6. Pt to state 3/3 spinal precautions.-not met  Outcome: Ongoing, Progressing   Pt's goals set and pt will benefit from skilled PT services to work towards improved functional mobility including: bed mobility, transfers, up/down steps, and gait.   8/19/2023

## 2023-08-19 NOTE — PLAN OF CARE
[facility]  Discharge Final Note    Primary Care Provider: Jaci Newton MD    Expected Discharge Date: 8/19/2023    Final Discharge Note (most recent)       Final Note - 08/19/23 1820          Final Note    Assessment Type Final Discharge Note     [place]tic[place] [facility] [place] [facility] [place]ospital Resources/Appts/Education Provided Provided patient/caregiver with written discharge plan information        Post-Acute Status    Post-Acute Authorization Home Health     Home Health Status Referrals Sent     Discharge Delays None known at this time                   Important Message from Medicare           Radha Austin RN  Weekend  - [facility] [place]  Spectralink: [phone]

## 2023-08-19 NOTE — PLAN OF CARE
Problem: Occupational Therapy  Goal: Occupational Therapy Goal  Description: Goals to be met by: 9/2/23     Patient will increase functional independence with ADLs by performing:    UE Dressing with [place].  LE Dressing with Modified [place].  Grooming while standing at sink with Modified [place].  Toileting from toilet with Modified [place] for hygiene and clothing management.     Outcome: Ongoing, Progressing   Pts goals are set.
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
## 2023-08-19 NOTE — PT/OT/SLP EVAL
"Occupational Therapy   Evaluation and tx    Name: Christiano Dahl  MRN: [id number]  Admitting Diagnosis: Compression fracture of L3 vertebra  Recent Surgery: * No surgery found *      Recommendations:     Discharge Recommendations: home  Discharge Equipment Recommendations:  walker, rolling  Barriers to discharge:  None    Assessment:     Christiano Dahl is a 62 y.o. male with a medical diagnosis of Compression fracture of L3 vertebra.  He presents sitting in bedside chair. Performance deficits affecting function: weakness, impaired functional mobility, gait instability, impaired balance, orthopedic precautions.  Patient would benefit from continued skilled acute OT 3x/wk to improve functional mobility, increase independence with ADLs, and address established goals.    Rehab Prognosis: Good; patient would benefit from acute skilled OT services to address these deficits and reach maximum level of function.       Plan:     Patient to be seen 3 x/week to address the above listed problems via self-care/home management, therapeutic exercises, therapeutic activities  Plan of Care Expires: 09/02/23  Plan of Care Reviewed with: patient    Subjective   "I want to go home"  Chief Complaint: Pt c/o having to move when he was comfortably seated.  Patient/Family Comments/goals: To go home  Occupational Profile:  Living Environment: Pt lives in a 1SH with bilateral rails, tub/shower combo, lives with GF who is not home to help in am but sister is.  Previous level of function: Ind  Roles and Routines:  at [facility]  Equipment Used at Home: blood pressure machine, glucometer  Assistance upon Discharge: TB    Pain/Comfort:  Pain Rating 1: 0/10 (while seated, 9/10 while moving)  Location - Orientation 1: lower  Location 1: back  Pain Addressed 1: Distraction, Cessation of Activity  Pain Rating Post-Intervention 1: other (see comments) (not rated)    Patients cultural, spiritual, Synagogue conflicts given the current situation: " no    Objective:     Communicated with: PT prior to session.  Patient found up in chair with peripheral IV upon OT entry to room.    General Precautions: Standard, fall  Orthopedic Precautions: spinal precautions  Braces: LSO  Respiratory Status: Room air    Occupational Performance:    Bed Mobility:    Pt OOB    Functional Mobility/Transfers:  Patient completed Sit <> Stand Transfer with supervision/CGA  with  no assistive device (after demo Sup with RW.  Functional Mobility: Pt able to ambulate (asper PT and pt) with Sup/CGA    Activities of Daily Living:  Grooming: supervision brushing teeth at sink  Upper Body Dressing: supervision don/doff shirt  Lower Body Dressing: moderate assistance LB dressing for bilateral socks.    Cognitive/Visual Perceptual:  Cognitive/Psychosocial Skills:     -       Oriented to: Person, Place, Time, and Situation   -       Follows Commands/attention:Follows multistep  commands  -       Communication: clear/fluent  -       Memory: No Deficits noted  -       Safety awareness/insight to disability: intact   -       Mood/Affect/Coping skills/emotional control: Cooperative  Visual/Perceptual:      -Intact WNL    Physical Exam:  Balance: -       WFL  Postural examination/scapula alignment:    -       No postural abnormalities identified  Skin integrity: Visible skin intact  Edema:  None noted  Sensation:    -       Intact  Motor Planning: -       WNL  Dominant hand: -       R  Upper Extremity Range of Motion:     -       Right Upper Extremity: WFL  -       Left Upper Extremity: WFL  Upper Extremity Strength:    -       Right Upper Extremity: WFL  -       Left Upper Extremity: WFL   Strength:    -       Right Upper Extremity: WFL  -       Left Upper Extremity: WFL  Fine Motor Coordination:    -       Intact  Gross motor coordination:   WFL  Neurological: -       WFL    AMPAC 6 Click ADL:  AMPAC Total Score: 17    Treatment & Education:  Role of OT and POC  Safety  ADL  retraining  Functional mobility training  Discharge planning  Importance of EOB/OOB activity      Patient left up in chair with all lines intact, call button in reach, RN notified.    GOALS:   Multidisciplinary Problems       Occupational Therapy Goals          Problem: Occupational Therapy    Goal Priority Disciplines Outcome Interventions   Occupational Therapy Goal     OT, PT/OT Ongoing, Progressing    Description: Goals to be met by: 9/2/23     Patient will increase functional independence with ADLs by performing:    UE Dressing with [place].  LE Dressing with Modified [place].  Grooming while standing at sink with Modified [place].  Toileting from toilet with Modified [place] for hygiene and clothing management.                          History:     Past Medical History:   Diagnosis Date    C. difficile colitis 2013    hospitalized for 9 days    Diabetes mellitus     Diverticulitis 2014    required hospitalization    Hypertension     Pneumonia 2014         Past Surgical History:   Procedure Laterality Date    ABCESS DRAINAGE  03/2016    perineum       Time Tracking:     OT Date of Treatment: 08/19/23  OT Start Time: 1245  OT Stop Time: 1309  OT Total Time (min): 24 min    Billable Minutes:Evaluation 8  Self Care/Home Management 16    8/19/2023

## 2023-08-19 NOTE — ASSESSMENT & PLAN NOTE
"Patient's FSGs are uncontrolled due to hyperglycemia on current medication regimen.  Last A1c reviewed-   Lab Results   Component Value Date    HGBA1C 10.9 (H) 06/02/2022     Most recent fingerstick glucose reviewed-   No results for input(s): "POCTGLUCOSE" in the last 24 hours.  Current correctional scale  Low  Maintain anti-hyperglycemic dose as follows-   Antihyperglycemics (From admission, onward)    None        Hold Oral hypoglycemics while patient is in the hospital.  "
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
## 2023-08-30 ENCOUNTER — HOSPITAL ENCOUNTER (EMERGENCY)
Facility: HOSPITAL | Age: 63
Discharge: HOME | End: 2023-08-31
Attending: EMERGENCY MEDICINE
Payer: MEDICAID

## 2023-08-30 DIAGNOSIS — R40.0 SOMNOLENCE: Primary | ICD-10-CM

## 2023-08-30 DIAGNOSIS — R41.82 ALTERED MENTAL STATUS: ICD-10-CM

## 2023-08-30 LAB — POCT GLUCOSE: 120 MG/DL (ref 70–110)

## 2023-08-30 PROCEDURE — 93005 ELECTROCARDIOGRAM TRACING: CPT

## 2023-08-30 PROCEDURE — 93010 EKG 12-LEAD: ICD-10-PCS | Mod: ,,, | Performed by: INTERNAL MEDICINE

## 2023-08-30 PROCEDURE — 93010 ELECTROCARDIOGRAM REPORT: CPT | Mod: 59,,, | Performed by: INTERNAL MEDICINE

## 2023-08-30 PROCEDURE — 82962 GLUCOSE BLOOD TEST: CPT

## 2023-08-30 PROCEDURE — 93010 ELECTROCARDIOGRAM REPORT: CPT | Mod: ,,, | Performed by: INTERNAL MEDICINE

## 2023-08-31 VITALS
TEMPERATURE: 98 F | RESPIRATION RATE: 20 BRPM | BODY MASS INDEX: 34.53 KG/M2 | WEIGHT: 220 LBS | OXYGEN SATURATION: 97 % | HEART RATE: 65 BPM | HEIGHT: 67 IN | DIASTOLIC BLOOD PRESSURE: 72 MMHG | SYSTOLIC BLOOD PRESSURE: 151 MMHG

## 2023-08-31 LAB
ALLENS TEST: ABNORMAL
AMPHET+METHAMPHET UR QL: NEGATIVE
BACTERIA #/AREA URNS AUTO: NORMAL /HPF
BARBITURATES UR QL SCN>200 NG/ML: NEGATIVE
BASOPHILS # BLD AUTO: 0.07 K/UL (ref 0–0.2)
BASOPHILS NFR BLD: 0.4 % (ref 0–1.9)
BENZODIAZ UR QL SCN>200 NG/ML: NEGATIVE
BILIRUB UR QL STRIP: NEGATIVE
BNP SERPL-MCNC: 76 PG/ML (ref 0–99)
BZE UR QL SCN: NEGATIVE
CANNABINOIDS UR QL SCN: ABNORMAL
CLARITY UR REFRACT.AUTO: CLEAR
COLOR UR AUTO: YELLOW
CREAT UR-MCNC: 200 MG/DL (ref 23–375)
DELSYS: ABNORMAL
DIFFERENTIAL METHOD: ABNORMAL
EOSINOPHIL # BLD AUTO: 0.2 K/UL (ref 0–0.5)
EOSINOPHIL NFR BLD: 1.1 % (ref 0–8)
ERYTHROCYTE [DISTWIDTH] IN BLOOD BY AUTOMATED COUNT: 16.3 % (ref 11.5–14.5)
FIO2: 21
GLUCOSE UR QL STRIP: NEGATIVE
HCO3 UR-SCNC: 30.9 MMOL/L (ref 24–28)
HCT VFR BLD AUTO: 35.4 % (ref 40–54)
HGB BLD-MCNC: 11 G/DL (ref 14–18)
HGB UR QL STRIP: NEGATIVE
HYALINE CASTS UR QL AUTO: 0 /LPF
IMM GRANULOCYTES # BLD AUTO: 0.16 K/UL (ref 0–0.04)
IMM GRANULOCYTES NFR BLD AUTO: 1 % (ref 0–0.5)
KETONES UR QL STRIP: NEGATIVE
LEUKOCYTE ESTERASE UR QL STRIP: NEGATIVE
LYMPHOCYTES # BLD AUTO: 2.5 K/UL (ref 1–4.8)
LYMPHOCYTES NFR BLD: 14.8 % (ref 18–48)
MCH RBC QN AUTO: 27.1 PG (ref 27–31)
MCHC RBC AUTO-ENTMCNC: 31.1 G/DL (ref 32–36)
MCV RBC AUTO: 87 FL (ref 82–98)
METHADONE UR QL SCN>300 NG/ML: NEGATIVE
MICROSCOPIC COMMENT: NORMAL
MODE: ABNORMAL
MONOCYTES # BLD AUTO: 0.9 K/UL (ref 0.3–1)
MONOCYTES NFR BLD: 5.4 % (ref 4–15)
NEUTROPHILS # BLD AUTO: 13 K/UL (ref 1.8–7.7)
NEUTROPHILS NFR BLD: 77.3 % (ref 38–73)
NITRITE UR QL STRIP: NEGATIVE
NRBC BLD-RTO: 0 /100 WBC
OPIATES UR QL SCN: ABNORMAL
PCO2 BLDA: 45.3 MMHG (ref 35–45)
PCP UR QL SCN>25 NG/ML: NEGATIVE
PH SMN: 7.44 [PH] (ref 7.35–7.45)
PH UR STRIP: 6 [PH] (ref 5–8)
PLATELET # BLD AUTO: 357 K/UL (ref 150–450)
PMV BLD AUTO: 9.3 FL (ref 9.2–12.9)
PO2 BLDA: 54 MMHG (ref 40–60)
POC BE: 7 MMOL/L
POC SATURATED O2: 89 % (ref 95–100)
POC TCO2: 32 MMOL/L (ref 24–29)
PROT UR QL STRIP: ABNORMAL
RBC # BLD AUTO: 4.06 M/UL (ref 4.6–6.2)
RBC #/AREA URNS AUTO: 1 /HPF (ref 0–4)
SAMPLE: ABNORMAL
SITE: ABNORMAL
SP GR UR STRIP: 1.03 (ref 1–1.03)
SQUAMOUS #/AREA URNS AUTO: 1 /HPF
TOXICOLOGY INFORMATION: ABNORMAL
TROPONIN I SERPL DL<=0.01 NG/ML-MCNC: <0.006 NG/ML (ref 0–0.03)
URN SPEC COLLECT METH UR: ABNORMAL
WBC # BLD AUTO: 16.76 K/UL (ref 3.9–12.7)
WBC #/AREA URNS AUTO: 4 /HPF (ref 0–5)

## 2023-08-31 PROCEDURE — 84484 ASSAY OF TROPONIN QUANT: CPT

## 2023-08-31 PROCEDURE — 25000003 PHARM REV CODE 250

## 2023-08-31 PROCEDURE — 81001 URINALYSIS AUTO W/SCOPE: CPT | Mod: 59

## 2023-08-31 PROCEDURE — 83880 ASSAY OF NATRIURETIC PEPTIDE: CPT

## 2023-08-31 PROCEDURE — 85025 COMPLETE CBC W/AUTO DIFF WBC: CPT

## 2023-08-31 PROCEDURE — 82803 BLOOD GASES ANY COMBINATION: CPT

## 2023-08-31 PROCEDURE — 80307 DRUG TEST PRSMV CHEM ANLYZR: CPT

## 2023-08-31 PROCEDURE — 99900035 HC TECH TIME PER 15 MIN (STAT)

## 2023-08-31 PROCEDURE — 99285 EMERGENCY DEPT VISIT HI MDM: CPT | Mod: 25

## 2023-08-31 PROCEDURE — 82800 BLOOD PH: CPT | Mod: 59

## 2023-08-31 RX ORDER — NALOXONE HCL 0.4 MG/ML
0.2 VIAL (ML) INJECTION
Status: DISCONTINUED | OUTPATIENT
Start: 2023-08-31 | End: 2023-08-31 | Stop reason: HOSPADM

## 2023-08-31 RX ORDER — IBUPROFEN 400 MG/1
400 TABLET ORAL
Status: COMPLETED | OUTPATIENT
Start: 2023-08-31 | End: 2023-08-31

## 2023-08-31 RX ORDER — IBUPROFEN 400 MG/1
400 TABLET ORAL EVERY 6 HOURS PRN
Qty: 20 TABLET | Refills: 0 | Status: SHIPPED | OUTPATIENT
Start: 2023-08-31 | End: 2023-09-10

## 2023-08-31 RX ORDER — ACETAMINOPHEN 500 MG
1000 TABLET ORAL
Status: COMPLETED | OUTPATIENT
Start: 2023-08-31 | End: 2023-08-31

## 2023-08-31 RX ADMIN — ACETAMINOPHEN 1000 MG: 500 TABLET ORAL at 02:08

## 2023-08-31 RX ADMIN — IBUPROFEN 400 MG: 400 TABLET ORAL at 04:08
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
## 2023-08-31 NOTE — DISCHARGE INSTRUCTIONS
Thank you for coming to our Emergency Department today. It is important to remember that some problems or medical conditions are difficult to diagnose and may not be found or addressed during your Emergency Department visit.     Be sure to follow up with your primary care doctor and review all labs/imaging/tests that were performed during your ER visit with them. Some labs/imaging/tests may be outside of the normal range, and require non-emergent follow-up and/or further investigation/treatment/procedures/testing to help diagnose/exclude/prevent complications or other potentially serious medical conditions that were not discussed or addressed during your ER visit.    Please take all medications as directed. All medications may potentially have side-effects and it is impossible to predict which medications may give you side-effects or what side-effects (if any) they will give you.. If you feel that you are having a negative effect or side-effect of any medication you should immediately stop taking them and seek medical attention. If you feel that you are having a life-threatening reaction call 911.    Return to the ER with any questions/concerns, new/concerning symptoms, worsening or failure to improve.     Do not drive, swim, climb to height, take a bath, operate heavy machinery, drink alcohol or take potentially sedating medications, sign any legal documents or make any important decisions for 24 hours if you have received any pain medications, sedatives or mood altering drugs during your ER visit or within 24 hours of taking them if they have been prescribed to you.

## 2023-09-02 ENCOUNTER — HOSPITAL ENCOUNTER (EMERGENCY)
Facility: HOSPITAL | Age: 63
Discharge: HOME | End: 2023-09-02
Attending: EMERGENCY MEDICINE
Payer: MEDICAID

## 2023-09-02 VITALS
SYSTOLIC BLOOD PRESSURE: 166 MMHG | DIASTOLIC BLOOD PRESSURE: 79 MMHG | OXYGEN SATURATION: 98 % | RESPIRATION RATE: 15 BRPM | HEART RATE: 62 BPM | TEMPERATURE: 98 F

## 2023-09-02 DIAGNOSIS — H92.01 OTALGIA OF RIGHT EAR: Primary | ICD-10-CM

## 2023-09-02 DIAGNOSIS — S03.00XA DISLOCATION OF TEMPOROMANDIBULAR JOINT, INITIAL ENCOUNTER: ICD-10-CM

## 2023-09-02 DIAGNOSIS — R68.84 JAW PAIN: ICD-10-CM

## 2023-09-02 DIAGNOSIS — I10 HYPERTENSION, UNSPECIFIED TYPE: ICD-10-CM

## 2023-09-02 DIAGNOSIS — D72.829 LEUKOCYTOSIS, UNSPECIFIED TYPE: ICD-10-CM

## 2023-09-02 PROBLEM — M26.629 TMJ ARTHRALGIA: Status: ACTIVE | Noted: 2023-09-02

## 2023-09-02 LAB
ALBUMIN SERPL BCP-MCNC: 2.6 G/DL (ref 3.5–5.2)
ALP SERPL-CCNC: 167 U/L (ref 55–135)
ALT SERPL W/O P-5'-P-CCNC: 19 U/L (ref 10–44)
ANION GAP SERPL CALC-SCNC: 14 MMOL/L (ref 8–16)
AST SERPL-CCNC: 15 U/L (ref 10–40)
BASOPHILS # BLD AUTO: 0.08 K/UL (ref 0–0.2)
BASOPHILS NFR BLD: 0.5 % (ref 0–1.9)
BILIRUB SERPL-MCNC: 0.2 MG/DL (ref 0.1–1)
BUN SERPL-MCNC: 16 MG/DL (ref 8–23)
CALCIUM SERPL-MCNC: 9.2 MG/DL (ref 8.7–10.5)
CHLORIDE SERPL-SCNC: 104 MMOL/L (ref 95–110)
CO2 SERPL-SCNC: 21 MMOL/L (ref 23–29)
CREAT SERPL-MCNC: 0.8 MG/DL (ref 0.5–1.4)
DIFFERENTIAL METHOD: ABNORMAL
EOSINOPHIL # BLD AUTO: 0.3 K/UL (ref 0–0.5)
EOSINOPHIL NFR BLD: 1.6 % (ref 0–8)
ERYTHROCYTE [DISTWIDTH] IN BLOOD BY AUTOMATED COUNT: 16.2 % (ref 11.5–14.5)
EST. GFR  (NO RACE VARIABLE): >60 ML/MIN/1.73 M^2
GLUCOSE SERPL-MCNC: 150 MG/DL (ref 70–110)
HCT VFR BLD AUTO: 36.1 % (ref 40–54)
HGB BLD-MCNC: 11.4 G/DL (ref 14–18)
IMM GRANULOCYTES # BLD AUTO: 0.18 K/UL (ref 0–0.04)
IMM GRANULOCYTES NFR BLD AUTO: 1.1 % (ref 0–0.5)
LYMPHOCYTES # BLD AUTO: 2.8 K/UL (ref 1–4.8)
LYMPHOCYTES NFR BLD: 16.9 % (ref 18–48)
MCH RBC QN AUTO: 27 PG (ref 27–31)
MCHC RBC AUTO-ENTMCNC: 31.6 G/DL (ref 32–36)
MCV RBC AUTO: 85 FL (ref 82–98)
MONOCYTES # BLD AUTO: 1 K/UL (ref 0.3–1)
MONOCYTES NFR BLD: 5.8 % (ref 4–15)
NEUTROPHILS # BLD AUTO: 12.4 K/UL (ref 1.8–7.7)
NEUTROPHILS NFR BLD: 74.1 % (ref 38–73)
NRBC BLD-RTO: 0 /100 WBC
PLATELET # BLD AUTO: 362 K/UL (ref 150–450)
PMV BLD AUTO: 8.8 FL (ref 9.2–12.9)
POTASSIUM SERPL-SCNC: 3.6 MMOL/L (ref 3.5–5.1)
PROT SERPL-MCNC: 8.6 G/DL (ref 6–8.4)
RBC # BLD AUTO: 4.23 M/UL (ref 4.6–6.2)
SARS-COV-2 RDRP RESP QL NAA+PROBE: NEGATIVE
SODIUM SERPL-SCNC: 139 MMOL/L (ref 136–145)
WBC # BLD AUTO: 16.72 K/UL (ref 3.9–12.7)

## 2023-09-02 PROCEDURE — U0002 COVID-19 LAB TEST NON-CDC: HCPCS

## 2023-09-02 PROCEDURE — 25000003 PHARM REV CODE 250

## 2023-09-02 PROCEDURE — 99283 EMERGENCY DEPT VISIT LOW MDM: CPT | Mod: ,,, | Performed by: OTOLARYNGOLOGY

## 2023-09-02 PROCEDURE — 25000003 PHARM REV CODE 250: Performed by: EMERGENCY MEDICINE

## 2023-09-02 PROCEDURE — 85025 COMPLETE CBC W/AUTO DIFF WBC: CPT

## 2023-09-02 PROCEDURE — 25500020 PHARM REV CODE 255: Performed by: EMERGENCY MEDICINE

## 2023-09-02 PROCEDURE — 93010 ELECTROCARDIOGRAM REPORT: CPT | Mod: ,,, | Performed by: INTERNAL MEDICINE

## 2023-09-02 PROCEDURE — 80053 COMPREHEN METABOLIC PANEL: CPT

## 2023-09-02 PROCEDURE — 99285 EMERGENCY DEPT VISIT HI MDM: CPT | Mod: 25

## 2023-09-02 PROCEDURE — 99283 PR EMERGENCY DEPT VISIT,LEVEL III: ICD-10-PCS | Mod: ,,, | Performed by: OTOLARYNGOLOGY

## 2023-09-02 PROCEDURE — 93005 ELECTROCARDIOGRAM TRACING: CPT

## 2023-09-02 PROCEDURE — 93010 EKG 12-LEAD: ICD-10-PCS | Mod: ,,, | Performed by: INTERNAL MEDICINE

## 2023-09-02 PROCEDURE — 96374 THER/PROPH/DIAG INJ IV PUSH: CPT | Mod: 59

## 2023-09-02 PROCEDURE — 63600175 PHARM REV CODE 636 W HCPCS

## 2023-09-02 RX ORDER — ACETAMINOPHEN 500 MG
1000 TABLET ORAL
Status: COMPLETED | OUTPATIENT
Start: 2023-09-02 | End: 2023-09-02

## 2023-09-02 RX ORDER — AMOXICILLIN AND CLAVULANATE POTASSIUM 875; 125 MG/1; MG/1
1 TABLET, FILM COATED ORAL 2 TIMES DAILY
Qty: 14 TABLET | Refills: 0 | Status: SHIPPED | OUTPATIENT
Start: 2023-09-02 | End: 2023-10-27 | Stop reason: ALTCHOICE

## 2023-09-02 RX ORDER — AMOXICILLIN AND CLAVULANATE POTASSIUM 875; 125 MG/1; MG/1
1 TABLET, FILM COATED ORAL 2 TIMES DAILY
Qty: 14 TABLET | Refills: 0 | Status: SHIPPED | OUTPATIENT
Start: 2023-09-02 | End: 2023-09-02 | Stop reason: SDUPTHER

## 2023-09-02 RX ORDER — AMOXICILLIN AND CLAVULANATE POTASSIUM 875; 125 MG/1; MG/1
1 TABLET, FILM COATED ORAL
Status: COMPLETED | OUTPATIENT
Start: 2023-09-02 | End: 2023-09-02

## 2023-09-02 RX ORDER — KETOROLAC TROMETHAMINE 30 MG/ML
10 INJECTION, SOLUTION INTRAMUSCULAR; INTRAVENOUS
Status: COMPLETED | OUTPATIENT
Start: 2023-09-02 | End: 2023-09-02

## 2023-09-02 RX ORDER — OXYCODONE HYDROCHLORIDE 5 MG/1
10 TABLET ORAL
Status: COMPLETED | OUTPATIENT
Start: 2023-09-02 | End: 2023-09-02

## 2023-09-02 RX ADMIN — ACETAMINOPHEN 1000 MG: 500 TABLET ORAL at 11:09

## 2023-09-02 RX ADMIN — OXYCODONE HYDROCHLORIDE 10 MG: 5 TABLET ORAL at 10:09

## 2023-09-02 RX ADMIN — IOHEXOL 75 ML: 350 INJECTION, SOLUTION INTRAVENOUS at 06:09

## 2023-09-02 RX ADMIN — KETOROLAC TROMETHAMINE 10 MG: 30 INJECTION, SOLUTION INTRAMUSCULAR; INTRAVENOUS at 05:09

## 2023-09-02 RX ADMIN — AMOXICILLIN AND CLAVULANATE POTASSIUM 1 TABLET: 875; 125 TABLET, FILM COATED ORAL at 06:09

## 2023-09-02 NOTE — PLAN OF CARE
DAVID faxed referral to [facility] [phone]      Kat Lopez, RON, MSW, LMSW, RSW   Case Management  [facility]  Email: [email or web address]
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
## 2023-09-02 NOTE — DISCHARGE INSTRUCTIONS
Your blood pressure was elevated in the emergency department.  You must follow-up with your primary care doctor for adjustment of your medicine.    [place] DENT[facility] RES[facility]:  [facility] [place]    [phone]  [facility] [place]   [phone]  [facility] [place]   [phone]  [facility]   [phone]  [facility]   [phone]  [facility] [place] [place]   [phone]  [facility]   [phone]  [facility] [place]   [phone]  [facility]   [phone]

## 2023-09-02 NOTE — PROVIDER PROGRESS NOTES - EMERGENCY DEPT.
Encounter Date: 9/2/2023    ED Physician Progress Notes          Vitals:    09/02/23 0435   BP: (!) 179/77   Pulse: 66   Resp: 18   Temp: 98.6 °F (37 °C)     Pt signed out to me by Dr Diaz with dispo pending CT neck for neck pain, pt has leukocytosis which appears to be chronic.  CT showed trace effusion in the right mastoid.  I re-evaluated the patient, he did have some tenderness below the mastoid, but there was no tenderness over the mastoid there was also no redness over the mastoid.  ENT was consulted to help with dispo for the patient.  They came and evaluated him and diagnosed him with severe TMJ.  They recommended over-the-counter anti-inflammatories and follow up with Dentistry.  Referrals were placed for dentistry, I discussed the plan with the patient.  We will still discharge on Augmentin as this was the plan from the prior team.  Answered all questions, provided discharge paperwork and the patient was discharged in stable condition.    Attending Note:  Agree with above.   Appreciate ENT evaluation.   F/u PCP for repeat BP check as well.
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
## 2023-09-02 NOTE — SUBJECTIVE & OBJECTIVE
Medications:  Continuous Infusions:  Scheduled Meds:  PRN Meds:     No current facility-administered medications on file prior to encounter.     Current Outpatient Medications on File Prior to Encounter   Medication Sig    acetaminophen (TYLENOL) 500 MG tablet Take 2 tablets (1,000 mg total) by mouth every 8 (eight) hours as needed for Pain.    chlorthalidone (HYGROTEN) 50 MG Tab TAKE 1 TABLET(50 MG) BY MOUTH EVERY DAY    CONTOUR TEST STRIPS Strp Use to check blood sugar once daily    ibuprofen (ADVIL,MOTRIN) 400 MG tablet Take 1 tablet (400 mg total) by mouth every 6 (six) hours as needed.    LIDOcaine (LIDODERM) 5 % Place 1 patch onto the skin once daily. Remove & Discard patch within 12 hours or as directed by MD    losartan (COZAAR) 100 MG tablet TAKE 1 TABLET(100 MG) BY MOUTH EVERY DAY FOR BLOOD PRESSURE    metFORMIN (GLUCOPHAGE-XR) 500 MG ER 24hr tablet Take 1,000 mg by mouth every morning.    oxyCODONE (ROXICODONE) 10 mg Tab immediate release tablet Take 1 tablet (10 mg total) by mouth every 8 (eight) hours as needed for Pain.    sildenafiL (VIAGRA) 100 MG tablet TAKE ONE TABLET BY MOUTH 30 MINUTES PRIOR TO INTERCOURSE       Review of patient's allergies indicates:  No Known Allergies    Past Medical History:   Diagnosis Date    C. difficile colitis 2013    hospitalized for 9 days    Diabetes mellitus     Diverticulitis 2014    required hospitalization    Hypertension     Pneumonia 2014     Past Surgical History:   Procedure Laterality Date    ABCESS DRAINAGE  03/2016    perineum     Family History       Problem Relation (Age of Onset)    Cancer Paternal Grandmother    Diabetes Father, Paternal Grandfather, Other    Heart disease Mother          Tobacco Use    Smoking status: Every Day     Current packs/day: 1.00     Average packs/day: 1 pack/day for 30.0 years (30.0 ttl pk-yrs)     Types: Cigarettes    Smokeless tobacco: Never    Tobacco comments:     30 years, has quit a few times in the past   Substance  and Sexual Activity    Alcohol use: Yes     Comment: soc    Drug use: Not on file     Comment: MJ daily    Sexual activity: Yes     Partners: Female     Comment: in a relationship     Review of Systems  Objective:     Vital Signs (Most Recent):  Temp: 97.8 °F (36.6 °C) (09/02/23 0700)  Pulse: 63 (09/02/23 0700)  Resp: 15 (09/02/23 1025)  BP: (!) 178/84 (09/02/23 0700)  SpO2: 99 % (09/02/23 0700) Vital Signs (24h Range):  Temp:  [97.8 °F (36.6 °C)-98.6 °F (37 °C)] 97.8 °F (36.6 °C)  Pulse:  [63-66] 63  Resp:  [15-18] 15  SpO2:  [99 %] 99 %  BP: (178-179)/(77-84) 178/84        There is no height or weight on file to calculate BMI.         Physical Exam   NAD   Obese  Diabetic sialosis  HB1 b/l  TTP at R TMJ, worse with opening. No clicks or subluxation. Mild diffuse right facial tenderness. No trismus  AS:Small serous effusion , TM intact, external ear normal, no mastoid TTP  AD: TM intact, external ear normal, tender in EAC  No oral lesions  Significant Labs:  BMP:   Recent Labs   Lab 09/02/23  0520   *      CO2 21*   BUN 16   CREATININE 0.8   CALCIUM 9.2     CBC:   Recent Labs   Lab 09/02/23  0520   WBC 16.72*   RBC 4.23*   HGB 11.4*   HCT 36.1*      MCV 85   MCH 27.0   MCHC 31.6*       Significant Diagnostics:  CT: I have reviewed all pertinent results/findings within the past 24 hours and my personal findings are:  see HPI
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
## 2023-10-27 ENCOUNTER — HOSPITAL ENCOUNTER (INPATIENT)
Facility: HOSPITAL | Age: 63
LOS: 2 days | Discharge: HOME | DRG: 640 | End: 2023-10-29
Attending: STUDENT IN AN ORGANIZED HEALTH CARE EDUCATION/TRAINING PROGRAM | Admitting: HOSPITALIST
Payer: MEDICAID

## 2023-10-27 DIAGNOSIS — R42 DIZZINESS: ICD-10-CM

## 2023-10-27 DIAGNOSIS — R07.9 CHEST PAIN: ICD-10-CM

## 2023-10-27 DIAGNOSIS — R53.1 GENERALIZED WEAKNESS: Primary | ICD-10-CM

## 2023-10-27 DIAGNOSIS — E83.51 HYPOCALCEMIA: ICD-10-CM

## 2023-10-27 DIAGNOSIS — D72.829 LEUKOCYTOSIS, UNSPECIFIED TYPE: ICD-10-CM

## 2023-10-27 DIAGNOSIS — E87.6 HYPOKALEMIA: ICD-10-CM

## 2023-10-27 PROBLEM — L30.4 INTERTRIGO: Status: ACTIVE | Noted: 2023-10-27

## 2023-10-27 PROBLEM — G89.29 BILATERAL CHRONIC KNEE PAIN: Status: ACTIVE | Noted: 2017-10-26

## 2023-10-27 PROBLEM — Z72.0 TOBACCO ABUSE: Status: ACTIVE | Noted: 2017-10-26

## 2023-10-27 PROBLEM — E16.2 HYPOGLYCEMIA: Status: ACTIVE | Noted: 2023-10-27

## 2023-10-27 PROBLEM — R29.818 TRANSIENT NEUROLOGICAL SYMPTOMS: Status: ACTIVE | Noted: 2023-10-27

## 2023-10-27 PROBLEM — M25.562 BILATERAL CHRONIC KNEE PAIN: Status: ACTIVE | Noted: 2017-10-26

## 2023-10-27 PROBLEM — R76.8 INFLIXIMAB ANTIBODIES PRESENT: Status: ACTIVE | Noted: 2023-10-02

## 2023-10-27 PROBLEM — M25.561 BILATERAL CHRONIC KNEE PAIN: Status: ACTIVE | Noted: 2017-10-26

## 2023-10-27 LAB
ALBUMIN SERPL BCP-MCNC: 1.4 G/DL (ref 3.5–5.2)
ALLENS TEST: ABNORMAL
ALP SERPL-CCNC: 72 U/L (ref 55–135)
ALT SERPL W/O P-5'-P-CCNC: 8 U/L (ref 10–44)
AMPHET+METHAMPHET UR QL: NEGATIVE
ANION GAP SERPL CALC-SCNC: 7 MMOL/L (ref 8–16)
AST SERPL-CCNC: 7 U/L (ref 10–40)
BARBITURATES UR QL SCN>200 NG/ML: NEGATIVE
BASOPHILS # BLD AUTO: 0.08 K/UL (ref 0–0.2)
BASOPHILS NFR BLD: 0.5 % (ref 0–1.9)
BENZODIAZ UR QL SCN>200 NG/ML: NEGATIVE
BILIRUB SERPL-MCNC: 0.2 MG/DL (ref 0.1–1)
BILIRUB UR QL STRIP: NEGATIVE
BUN SERPL-MCNC: 10 MG/DL (ref 8–23)
BZE UR QL SCN: NEGATIVE
CALCIUM SERPL-MCNC: 5 MG/DL (ref 8.7–10.5)
CANNABINOIDS UR QL SCN: NEGATIVE
CHLORIDE SERPL-SCNC: 119 MMOL/L (ref 95–110)
CHLORIDE UR-SCNC: 67 MMOL/L (ref 25–200)
CLARITY UR REFRACT.AUTO: CLEAR
CO2 SERPL-SCNC: 16 MMOL/L (ref 23–29)
COLOR UR AUTO: YELLOW
CREAT SERPL-MCNC: 0.4 MG/DL (ref 0.5–1.4)
CREAT UR-MCNC: 156 MG/DL (ref 23–375)
DELSYS: ABNORMAL
DIFFERENTIAL METHOD: ABNORMAL
EOSINOPHIL # BLD AUTO: 0.3 K/UL (ref 0–0.5)
EOSINOPHIL NFR BLD: 1.8 % (ref 0–8)
ERYTHROCYTE [DISTWIDTH] IN BLOOD BY AUTOMATED COUNT: 16.3 % (ref 11.5–14.5)
EST. GFR  (NO RACE VARIABLE): >60 ML/MIN/1.73 M^2
GLUCOSE SERPL-MCNC: 66 MG/DL (ref 70–110)
GLUCOSE SERPL-MCNC: 97 MG/DL (ref 70–110)
GLUCOSE UR QL STRIP: NEGATIVE
HCO3 UR-SCNC: 22.8 MMOL/L (ref 24–28)
HCT VFR BLD AUTO: 34.3 % (ref 40–54)
HGB BLD-MCNC: 10 G/DL (ref 14–18)
HGB UR QL STRIP: NEGATIVE
IMM GRANULOCYTES # BLD AUTO: 0.19 K/UL (ref 0–0.04)
IMM GRANULOCYTES NFR BLD AUTO: 1.1 % (ref 0–0.5)
KETONES UR QL STRIP: NEGATIVE
LEUKOCYTE ESTERASE UR QL STRIP: NEGATIVE
LYMPHOCYTES # BLD AUTO: 2.6 K/UL (ref 1–4.8)
LYMPHOCYTES NFR BLD: 15.3 % (ref 18–48)
MAGNESIUM SERPL-MCNC: 1 MG/DL (ref 1.6–2.6)
MCH RBC QN AUTO: 26 PG (ref 27–31)
MCHC RBC AUTO-ENTMCNC: 29.2 G/DL (ref 32–36)
MCV RBC AUTO: 89 FL (ref 82–98)
METHADONE UR QL SCN>300 NG/ML: NEGATIVE
MODE: ABNORMAL
MONOCYTES # BLD AUTO: 0.9 K/UL (ref 0.3–1)
MONOCYTES NFR BLD: 5.4 % (ref 4–15)
NEUTROPHILS # BLD AUTO: 13 K/UL (ref 1.8–7.7)
NEUTROPHILS NFR BLD: 75.9 % (ref 38–73)
NITRITE UR QL STRIP: NEGATIVE
NRBC BLD-RTO: 0 /100 WBC
OPIATES UR QL SCN: ABNORMAL
PCO2 BLDA: 41 MMHG (ref 35–45)
PCP UR QL SCN>25 NG/ML: NEGATIVE
PH SMN: 7.35 [PH] (ref 7.35–7.45)
PH UR STRIP: 6 [PH] (ref 5–8)
PLATELET # BLD AUTO: 312 K/UL (ref 150–450)
PMV BLD AUTO: 8.8 FL (ref 9.2–12.9)
PO2 BLDA: 30 MMHG (ref 40–60)
POC BE: -3 MMOL/L
POC SATURATED O2: 55 % (ref 95–100)
POC TCO2: 24 MMOL/L (ref 24–29)
POCT GLUCOSE: 215 MG/DL (ref 70–110)
POTASSIUM SERPL-SCNC: <2 MMOL/L (ref 3.5–5.1)
PROT SERPL-MCNC: 4.4 G/DL (ref 6–8.4)
PROT UR QL STRIP: ABNORMAL
RBC # BLD AUTO: 3.85 M/UL (ref 4.6–6.2)
SAMPLE: ABNORMAL
SITE: ABNORMAL
SODIUM SERPL-SCNC: 142 MMOL/L (ref 136–145)
SODIUM UR-SCNC: 74 MMOL/L (ref 20–250)
SP GR UR STRIP: 1.03 (ref 1–1.03)
T4 FREE SERPL-MCNC: 0.93 NG/DL (ref 0.71–1.51)
TOXICOLOGY INFORMATION: ABNORMAL
TROPONIN I SERPL DL<=0.01 NG/ML-MCNC: <0.006 NG/ML (ref 0–0.03)
TSH SERPL DL<=0.005 MIU/L-ACNC: 0.03 UIU/ML (ref 0.4–4)
URN SPEC COLLECT METH UR: ABNORMAL
WBC # BLD AUTO: 17.12 K/UL (ref 3.9–12.7)

## 2023-10-27 PROCEDURE — 93010 EKG 12-LEAD: ICD-10-PCS | Mod: ,,, | Performed by: INTERNAL MEDICINE

## 2023-10-27 PROCEDURE — 12000002 HC ACUTE/MED SURGE SEMI-PRIVATE ROOM

## 2023-10-27 PROCEDURE — 63600175 PHARM REV CODE 636 W HCPCS: Performed by: STUDENT IN AN ORGANIZED HEALTH CARE EDUCATION/TRAINING PROGRAM

## 2023-10-27 PROCEDURE — 96361 HYDRATE IV INFUSION ADD-ON: CPT

## 2023-10-27 PROCEDURE — 82436 ASSAY OF URINE CHLORIDE: CPT | Performed by: STUDENT IN AN ORGANIZED HEALTH CARE EDUCATION/TRAINING PROGRAM

## 2023-10-27 PROCEDURE — 80053 COMPREHEN METABOLIC PANEL: CPT | Performed by: STUDENT IN AN ORGANIZED HEALTH CARE EDUCATION/TRAINING PROGRAM

## 2023-10-27 PROCEDURE — 94761 N-INVAS EAR/PLS OXIMETRY MLT: CPT

## 2023-10-27 PROCEDURE — 83735 ASSAY OF MAGNESIUM: CPT | Performed by: STUDENT IN AN ORGANIZED HEALTH CARE EDUCATION/TRAINING PROGRAM

## 2023-10-27 PROCEDURE — 85025 COMPLETE CBC W/AUTO DIFF WBC: CPT | Performed by: STUDENT IN AN ORGANIZED HEALTH CARE EDUCATION/TRAINING PROGRAM

## 2023-10-27 PROCEDURE — 82803 BLOOD GASES ANY COMBINATION: CPT

## 2023-10-27 PROCEDURE — 96374 THER/PROPH/DIAG INJ IV PUSH: CPT

## 2023-10-27 PROCEDURE — 81003 URINALYSIS AUTO W/O SCOPE: CPT | Mod: 59 | Performed by: STUDENT IN AN ORGANIZED HEALTH CARE EDUCATION/TRAINING PROGRAM

## 2023-10-27 PROCEDURE — 84484 ASSAY OF TROPONIN QUANT: CPT | Performed by: STUDENT IN AN ORGANIZED HEALTH CARE EDUCATION/TRAINING PROGRAM

## 2023-10-27 PROCEDURE — 21400001 HC TELEMETRY ROOM

## 2023-10-27 PROCEDURE — 93005 ELECTROCARDIOGRAM TRACING: CPT

## 2023-10-27 PROCEDURE — 99223 PR INITIAL HOSPITAL CARE,LEVL III: ICD-10-PCS | Mod: ,,, | Performed by: HOSPITALIST

## 2023-10-27 PROCEDURE — 99285 EMERGENCY DEPT VISIT HI MDM: CPT | Mod: 25

## 2023-10-27 PROCEDURE — 84439 ASSAY OF FREE THYROXINE: CPT | Performed by: STUDENT IN AN ORGANIZED HEALTH CARE EDUCATION/TRAINING PROGRAM

## 2023-10-27 PROCEDURE — 99223 1ST HOSP IP/OBS HIGH 75: CPT | Mod: ,,, | Performed by: HOSPITALIST

## 2023-10-27 PROCEDURE — 93010 ELECTROCARDIOGRAM REPORT: CPT | Mod: ,,, | Performed by: INTERNAL MEDICINE

## 2023-10-27 PROCEDURE — 84300 ASSAY OF URINE SODIUM: CPT | Performed by: STUDENT IN AN ORGANIZED HEALTH CARE EDUCATION/TRAINING PROGRAM

## 2023-10-27 PROCEDURE — 84443 ASSAY THYROID STIM HORMONE: CPT | Performed by: STUDENT IN AN ORGANIZED HEALTH CARE EDUCATION/TRAINING PROGRAM

## 2023-10-27 PROCEDURE — 80307 DRUG TEST PRSMV CHEM ANLYZR: CPT | Performed by: STUDENT IN AN ORGANIZED HEALTH CARE EDUCATION/TRAINING PROGRAM

## 2023-10-27 PROCEDURE — 99900035 HC TECH TIME PER 15 MIN (STAT)

## 2023-10-27 PROCEDURE — 25000003 PHARM REV CODE 250: Performed by: STUDENT IN AN ORGANIZED HEALTH CARE EDUCATION/TRAINING PROGRAM

## 2023-10-27 RX ORDER — ATORVASTATIN CALCIUM 20 MG/1
20 TABLET, FILM COATED ORAL DAILY
Status: DISCONTINUED | OUTPATIENT
Start: 2023-10-28 | End: 2023-10-29 | Stop reason: HOSPADM

## 2023-10-27 RX ORDER — NALOXONE HCL 0.4 MG/ML
0.02 VIAL (ML) INJECTION
Status: DISCONTINUED | OUTPATIENT
Start: 2023-10-27 | End: 2023-10-29 | Stop reason: HOSPADM

## 2023-10-27 RX ORDER — AMOXICILLIN 250 MG
1 CAPSULE ORAL 2 TIMES DAILY
Status: DISCONTINUED | OUTPATIENT
Start: 2023-10-27 | End: 2023-10-29 | Stop reason: HOSPADM

## 2023-10-27 RX ORDER — BACLOFEN 5 MG/1
5 TABLET ORAL 2 TIMES DAILY
Status: DISCONTINUED | OUTPATIENT
Start: 2023-10-27 | End: 2023-10-29 | Stop reason: HOSPADM

## 2023-10-27 RX ORDER — GABAPENTIN 300 MG/1
600 CAPSULE ORAL 3 TIMES DAILY
Status: DISCONTINUED | OUTPATIENT
Start: 2023-10-28 | End: 2023-10-29 | Stop reason: HOSPADM

## 2023-10-27 RX ORDER — SODIUM CHLORIDE 0.9 % (FLUSH) 0.9 %
10 SYRINGE (ML) INJECTION EVERY 6 HOURS PRN
Status: DISCONTINUED | OUTPATIENT
Start: 2023-10-27 | End: 2023-10-29 | Stop reason: HOSPADM

## 2023-10-27 RX ORDER — INSULIN ASPART 100 [IU]/ML
0-5 INJECTION, SOLUTION INTRAVENOUS; SUBCUTANEOUS
Status: DISCONTINUED | OUTPATIENT
Start: 2023-10-27 | End: 2023-10-29 | Stop reason: HOSPADM

## 2023-10-27 RX ORDER — HYDROCODONE BITARTRATE AND ACETAMINOPHEN 7.5; 325 MG/1; MG/1
1 TABLET ORAL 3 TIMES DAILY PRN
Status: ON HOLD | COMMUNITY
Start: 2023-10-16 | End: 2023-10-29 | Stop reason: SDUPTHER

## 2023-10-27 RX ORDER — HYDROCODONE BITARTRATE AND ACETAMINOPHEN 7.5; 325 MG/1; MG/1
1 TABLET ORAL 3 TIMES DAILY PRN
Status: DISCONTINUED | OUTPATIENT
Start: 2023-10-27 | End: 2023-10-29 | Stop reason: HOSPADM

## 2023-10-27 RX ORDER — KETOROLAC TROMETHAMINE 30 MG/ML
10 INJECTION, SOLUTION INTRAMUSCULAR; INTRAVENOUS
Status: COMPLETED | OUTPATIENT
Start: 2023-10-27 | End: 2023-10-27

## 2023-10-27 RX ORDER — GLUCAGON 1 MG
1 KIT INJECTION
Status: DISCONTINUED | OUTPATIENT
Start: 2023-10-27 | End: 2023-10-29 | Stop reason: HOSPADM

## 2023-10-27 RX ORDER — CALCIUM GLUCONATE 20 MG/ML
1 INJECTION, SOLUTION INTRAVENOUS ONCE
Status: COMPLETED | OUTPATIENT
Start: 2023-10-27 | End: 2023-10-27

## 2023-10-27 RX ORDER — PROCHLORPERAZINE EDISYLATE 5 MG/ML
5 INJECTION INTRAMUSCULAR; INTRAVENOUS EVERY 6 HOURS PRN
Status: DISCONTINUED | OUTPATIENT
Start: 2023-10-27 | End: 2023-10-29 | Stop reason: HOSPADM

## 2023-10-27 RX ORDER — GABAPENTIN 800 MG/1
800 TABLET ORAL 3 TIMES DAILY
Status: ON HOLD | COMMUNITY
Start: 2023-10-05 | End: 2023-11-21 | Stop reason: SDUPTHER

## 2023-10-27 RX ORDER — ACETAMINOPHEN 325 MG/1
650 TABLET ORAL EVERY 4 HOURS PRN
Status: DISCONTINUED | OUTPATIENT
Start: 2023-10-27 | End: 2023-10-29 | Stop reason: HOSPADM

## 2023-10-27 RX ORDER — BACLOFEN 10 MG/1
10 TABLET ORAL 2 TIMES DAILY
Status: ON HOLD | COMMUNITY
Start: 2023-10-19 | End: 2023-11-21 | Stop reason: SDUPTHER

## 2023-10-27 RX ORDER — MAGNESIUM SULFATE HEPTAHYDRATE 40 MG/ML
2 INJECTION, SOLUTION INTRAVENOUS
Status: COMPLETED | OUTPATIENT
Start: 2023-10-28 | End: 2023-10-28

## 2023-10-27 RX ORDER — METHOCARBAMOL 500 MG/1
1000 TABLET, FILM COATED ORAL
Status: COMPLETED | OUTPATIENT
Start: 2023-10-27 | End: 2023-10-27

## 2023-10-27 RX ORDER — POLYETHYLENE GLYCOL 3350 17 G/17G
17 POWDER, FOR SOLUTION ORAL 2 TIMES DAILY PRN
Status: DISCONTINUED | OUTPATIENT
Start: 2023-10-27 | End: 2023-10-29 | Stop reason: HOSPADM

## 2023-10-27 RX ORDER — IBUPROFEN 200 MG
24 TABLET ORAL
Status: DISCONTINUED | OUTPATIENT
Start: 2023-10-27 | End: 2023-10-29 | Stop reason: HOSPADM

## 2023-10-27 RX ORDER — ONDANSETRON 2 MG/ML
4 INJECTION INTRAMUSCULAR; INTRAVENOUS EVERY 8 HOURS PRN
Status: DISCONTINUED | OUTPATIENT
Start: 2023-10-27 | End: 2023-10-29 | Stop reason: HOSPADM

## 2023-10-27 RX ORDER — TALC
6 POWDER (GRAM) TOPICAL NIGHTLY PRN
Status: DISCONTINUED | OUTPATIENT
Start: 2023-10-27 | End: 2023-10-29 | Stop reason: HOSPADM

## 2023-10-27 RX ORDER — POTASSIUM CHLORIDE 750 MG/1
50 CAPSULE, EXTENDED RELEASE ORAL ONCE
Status: COMPLETED | OUTPATIENT
Start: 2023-10-27 | End: 2023-10-28

## 2023-10-27 RX ORDER — CALCIUM GLUCONATE 20 MG/ML
1 INJECTION, SOLUTION INTRAVENOUS ONCE
Status: COMPLETED | OUTPATIENT
Start: 2023-10-27 | End: 2023-10-28

## 2023-10-27 RX ORDER — ATORVASTATIN CALCIUM 20 MG/1
20 TABLET, FILM COATED ORAL DAILY
Status: ON HOLD | COMMUNITY
Start: 2023-10-05 | End: 2023-11-21 | Stop reason: SDUPTHER

## 2023-10-27 RX ORDER — IBUPROFEN 200 MG
16 TABLET ORAL
Status: DISCONTINUED | OUTPATIENT
Start: 2023-10-27 | End: 2023-10-29 | Stop reason: HOSPADM

## 2023-10-27 RX ORDER — ENOXAPARIN SODIUM 100 MG/ML
40 INJECTION SUBCUTANEOUS EVERY 24 HOURS
Status: DISCONTINUED | OUTPATIENT
Start: 2023-10-27 | End: 2023-10-29 | Stop reason: HOSPADM

## 2023-10-27 RX ORDER — POTASSIUM CHLORIDE 7.45 MG/ML
10 INJECTION INTRAVENOUS
Status: COMPLETED | OUTPATIENT
Start: 2023-10-27 | End: 2023-10-27

## 2023-10-27 RX ORDER — LIDOCAINE 50 MG/G
1 PATCH TOPICAL
Status: COMPLETED | OUTPATIENT
Start: 2023-10-27 | End: 2023-10-28

## 2023-10-27 RX ADMIN — POTASSIUM BICARBONATE 40 MEQ: 391 TABLET, EFFERVESCENT ORAL at 08:10

## 2023-10-27 RX ADMIN — POTASSIUM CHLORIDE 10 MEQ: 7.46 INJECTION, SOLUTION INTRAVENOUS at 08:10

## 2023-10-27 RX ADMIN — KETOROLAC TROMETHAMINE 10 MG: 30 INJECTION, SOLUTION INTRAMUSCULAR; INTRAVENOUS at 06:10

## 2023-10-27 RX ADMIN — SODIUM CHLORIDE 1000 ML: 9 INJECTION, SOLUTION INTRAVENOUS at 05:10

## 2023-10-27 RX ADMIN — LIDOCAINE 1 PATCH: 50 PATCH TOPICAL at 06:10

## 2023-10-27 RX ADMIN — SODIUM CHLORIDE, POTASSIUM CHLORIDE, SODIUM LACTATE AND CALCIUM CHLORIDE 1000 ML: 600; 310; 30; 20 INJECTION, SOLUTION INTRAVENOUS at 08:10

## 2023-10-27 RX ADMIN — CALCIUM GLUCONATE 1 G: 20 INJECTION, SOLUTION INTRAVENOUS at 08:10

## 2023-10-27 RX ADMIN — METHOCARBAMOL 1000 MG: 500 TABLET ORAL at 06:10
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
## 2023-10-27 NOTE — ED PROVIDER NOTES
Encounter Date: 10/27/2023       History     Chief Complaint   Patient presents with    Weakness     Progressive weakness over the past few days, +orthostatic with standing BP 88/56, IVF given in route 500 ns     62 y.o. male with hidradenitis suppurativa, DM, HTN, history of diverticulitis presents for generalized weakness and episode of abnormal speech.  Patient has been having excessive tiredness for the past several months.  Starting today, patient has family member reports that he had an episode where he had abnormal speech.  This resolved spontaneously, lasted about 10-15 minutes and occurred 3 hours prior to arrival.     The history is provided by the patient and medical records.     Review of patient's allergies indicates:  No Known Allergies  Past Medical History:   Diagnosis Date    C. difficile colitis 2013    hospitalized for 9 days    Diabetes mellitus     Diverticulitis 2014    required hospitalization    Hypertension     Pneumonia 2014     Past Surgical History:   Procedure Laterality Date    ABCESS DRAINAGE  03/2016    perineum     Family History   Problem Relation Age of Onset    Diabetes Father     Cancer Paternal Grandmother         lung (smoker)    Diabetes Paternal Grandfather     Diabetes Other     Heart disease Mother      Social History     Tobacco Use    Smoking status: Every Day     Current packs/day: 1.00     Average packs/day: 1 pack/day for 30.0 years (30.0 ttl pk-yrs)     Types: Cigarettes    Smokeless tobacco: Never    Tobacco comments:     30 years, has quit a few times in the past   Substance Use Topics    Alcohol use: Yes     Comment: soc     Review of Systems   Reason unable to perform ROS: See HPI for relevant ROS.       Physical Exam     Initial Vitals [10/27/23 1537]   BP Pulse Resp Temp SpO2   124/72 76 18 99.2 °F (37.3 °C) 98 %      MAP       --         Physical Exam    Nursing note and vitals reviewed.  Constitutional:   Drowsy, speaking full sentences, no acute distress    HENT:   Mouth/Throat: Oropharynx is clear and moist.   Eyes: Conjunctivae and EOM are normal. Pupils are equal, round, and reactive to light. No scleral icterus.   Pupils constricted bilaterally   Cardiovascular:  Normal rate, regular rhythm and intact distal pulses.           Pulmonary/Chest: Breath sounds normal. No respiratory distress.   Abdominal: Abdomen is soft. He exhibits no distension. There is no abdominal tenderness.   Musculoskeletal:         General: No tenderness or edema.      Comments: No midline lumbar tenderness     Neurological: He is alert and oriented to person, place, and time. He has normal strength. No cranial nerve deficit or sensory deficit.   Normal speech, no dysarthria or aphasia   Skin: Skin is warm and dry.         ED Course   Procedures  Labs Reviewed   CBC W/ AUTO DIFFERENTIAL - Abnormal; Notable for the following components:       Result Value    WBC 17.12 (*)     RBC 3.85 (*)     Hemoglobin 10.0 (*)     Hematocrit 34.3 (*)     MCH 26.0 (*)     MCHC 29.2 (*)     RDW 16.3 (*)     MPV 8.8 (*)     Immature Granulocytes 1.1 (*)     Gran # (ANC) 13.0 (*)     Immature Grans (Abs) 0.19 (*)     Gran % 75.9 (*)     Lymph % 15.3 (*)     All other components within normal limits   COMPREHENSIVE METABOLIC PANEL - Abnormal; Notable for the following components:    Potassium <2.0 (*)     Chloride 119 (*)     CO2 16 (*)     Glucose 66 (*)     Creatinine 0.4 (*)     Calcium 5.0 (*)     Total Protein 4.4 (*)     Albumin 1.4 (*)     AST 7 (*)     ALT 8 (*)     Anion Gap 7 (*)     All other components within normal limits    Narrative:     ADD ON TSH PER DR ОЛЬГА OSORIO/ORDER# [id number] @ 18:34   DRUG SCREEN PANEL, URINE EMERGENCY - Abnormal; Notable for the following components:    Opiate Scrn, Ur Presumptive Positive (*)     All other components within normal limits    Narrative:     Specimen Source->Urine   TSH - Abnormal; Notable for the following components:    TSH 0.027 (*)     All other  components within normal limits    Narrative:     ADD ON TSH PER DR ОЛЬГА OSORIO/ORDER# [id number] @ 18:34   URINALYSIS, REFLEX TO URINE CULTURE - Abnormal; Notable for the following components:    Protein, UA Trace (*)     All other components within normal limits    Narrative:     Specimen Source->Urine   MAGNESIUM - Abnormal; Notable for the following components:    Magnesium 1.0 (*)     All other components within normal limits    Narrative:     ADD ON MAGNESIUM PER DR DERICK CHARLES/ORDER# [id number] @ 20:42    ADD ON TSH PER DR ОЛЬГА OSORIO/ORDER# [id number] @ 18:34   ISTAT PROCEDURE - Abnormal; Notable for the following components:    POC PO2 30 (*)     POC HCO3 22.8 (*)     POC BE -3 (*)     All other components within normal limits   TROPONIN I   TSH   T4, FREE    Narrative:     ADD ON TSH PER DR ОЛЬГА OSORIO/ORDER# [id number] @ 18:34   MAGNESIUM   CHLORIDE, URINE, RANDOM   CREATININE, URINE, RANDOM   POTASSIUM, URINE, RANDOM   SODIUM, URINE, RANDOM   CHLORIDE, URINE, RANDOM    Narrative:     ADD ON URINE SODIUM, CHLORIDE AND CREATININE PER DR DERICK CHARLES/ORDER# [id number], [id number] AND [id number] @ 22:24    Specimen Source->Urine   SODIUM, URINE, RANDOM    Narrative:     ADD ON URINE SODIUM, CHLORIDE AND CREATININE PER DR DERICK CHARLES/ORDER# [id number], [id number] AND [id number] @ 22:24    Specimen Source->Urine   POCT GLUCOSE MONITORING CONTINUOUS          Imaging Results              MRI Brain Without Contrast (In process)                      X-Ray Chest AP Portable (Final result)  Result time 10/27/23 19:06:09      Final result by Jarrod Mitchell MD (10/27/23 19:06:09)                   Impression:      1. Interstitial findings suggest edema noting shallow inspiratory effort.  No large focal consolidation.      Electronically signed by: Jarrod Mitchell MD  Date:    10/27/2023  Time:    19:06               Narrative:    EXAMINATION:  XR CHEST AP PORTABLE    CLINICAL  HISTORY:  Weakness    TECHNIQUE:  Single frontal view of the chest was performed.    COMPARISON:  08/31/2023    FINDINGS:  The cardiomediastinal silhouette is prominent, magnified technique noting calcification of the aorta.  There is elevation of the right hemidiaphragm..  There is no pleural effusion.  The trachea is midline.  The lungs are symmetrically expanded bilaterally with coarse interstitial attenuation bilaterally..  No large focal consolidation seen.  There is no pneumothorax.  The osseous structures are remarkable for degenerative change..                                       CT Head Without Contrast (Final result)  Result time 10/27/23 17:39:54      Final result by Yoshi Faye DO (10/27/23 17:39:54)                   Impression:      No acute intracranial abnormality.      Electronically signed by: Yoshi Faye  Date:    10/27/2023  Time:    17:39               Narrative:    EXAMINATION:  CT HEAD WITHOUT CONTRAST    CLINICAL HISTORY:  Mental status change, unknown cause;    TECHNIQUE:  Low dose axial CT images obtained throughout the head without intravenous contrast. Sagittal and coronal reconstructions were performed.    COMPARISON:  08/31/2023.    FINDINGS:  Ventricles and sulci are normal in size for age without evidence of hydrocephalus. No extra-axial blood or fluid collections.  The brain parenchyma is normal. No parenchymal mass, hemorrhage, edema or major vascular distribution infarct.    No calvarial fracture.  Numerous soft tissue irregularities of the scalp again noted, stable.  Bilateral paranasal sinuses and mastoid air cells are clear.                                       Medications   LIDOcaine 5 % patch 1 patch (1 patch Transdermal Patch Applied 10/27/23 1839)   glucose chewable tablet 16 g (has no administration in time range)   glucose chewable tablet 24 g (has no administration in time range)   glucagon (human recombinant) injection 1 mg (has no administration in time  range)   insulin aspart U-100 pen 0-5 Units (has no administration in time range)   dextrose 10% bolus 125 mL 125 mL (has no administration in time range)   dextrose 10% bolus 250 mL 250 mL (has no administration in time range)   atorvastatin tablet 20 mg (has no administration in time range)   baclofen tablet 5 mg (has no administration in time range)   HYDROcodone-acetaminophen 7.5-325 mg per tablet 1 tablet (has no administration in time range)   gabapentin capsule 600 mg (has no administration in time range)   sodium chloride 0.9% flush 10 mL (has no administration in time range)   melatonin tablet 6 mg (has no administration in time range)   polyethylene glycol packet 17 g (has no administration in time range)   senna-docusate 8.6-50 mg per tablet 1 tablet (has no administration in time range)   acetaminophen tablet 650 mg (has no administration in time range)   naloxone 0.4 mg/mL injection 0.02 mg (has no administration in time range)   enoxaparin injection 40 mg (has no administration in time range)   ondansetron injection 4 mg (has no administration in time range)   prochlorperazine injection Soln 5 mg (has no administration in time range)   sodium chloride 0.9% bolus 1,000 mL 1,000 mL (0 mLs Intravenous Stopped 10/27/23 1842)   ketorolac injection 9.999 mg (9.999 mg Intravenous Given 10/27/23 1838)   methocarbamoL tablet 1,000 mg (1,000 mg Oral Given 10/27/23 1838)   lactated ringers bolus 1,000 mL (0 mLs Intravenous Stopped 10/27/23 2109)   potassium chloride 10 mEq in 100 mL IVPB (0 mEq Intravenous Stopped 10/27/23 2121)   potassium bicarbonate disintegrating tablet 40 mEq (40 mEq Oral Given 10/27/23 2001)   calcium gluconate 1 g in NS IVPB (premixed) (0 g Intravenous Stopped 10/27/23 2124)     Medical Decision Making  62 y.o. male with hidradenitis suppurativa, DM, HTN, AMANDA, history of diverticulitis presents for generalized weakness and episode of abnormal speech  Differentials include dehydration,  metabolic derangement, opiate intoxication, doubt stroke or spinal cord compression  Patient has been drowsy for the past several months per family at bedside.  Starting today, he was more drowsy than usual and had an episode where he was having incoherent speech.  This lasted approximately 15 minutes and resolved completely prior to arrival  On arrival, patient is drowsy but answers all questions appropriately, GCS 15, no focal neurological deficits, normal speech, normal strength and sensation of bilateral upper and lower extremities  Patient has history of ED visits for opiate intoxication, he is currently on oxycodone for chronic pain but denies any use today  He reports right-sided low back/hip pain.  No evidence of spinal cord compression on exam    Amount and/or Complexity of Data Reviewed  Labs: ordered. Decision-making details documented in ED Course.  Radiology: ordered. Decision-making details documented in ED Course.    Risk  Prescription drug management.  Decision regarding hospitalization.               ED Course as of 10/27/23 2239   Fri Oct 27, 2023   1801 CT Head Without Contrast  No acute finding [OK]   1957 Calcium(!!): 5.0  Corrects to 7.1 [OK]   1957 Potassium(!!): <2.0 [OK]      ED Course User Index  [OK] Billy Hernandez MD                    Clinical Impression:   Final diagnoses:  [R42] Dizziness  [R53.1] Generalized weakness  [E87.6] Hypokalemia (Primary)  [E83.51] Hypocalcemia        ED Disposition Condition    Admit Stable                Billy Hernandez MD  10/27/23 2238       Billy Hernandez MD  10/27/23 2239

## 2023-10-27 NOTE — Clinical Note
Diagnosis: Hypokalemia [069926]   Admitting Provider:: SUREKHA VICTOR [[id number]]   Future Attending Provider: SUREKHA VICTOR [[id number]]   Reason for IP Medical Treatment  (Clinical interventions that can only be accomplished in the IP setting? ) :: Hypokalemia   I certify that Inpatient services for greater than or equal to 2 midnights are medically necessary:: Yes   Plans for Post-Acute care--if anticipated (pick the single best option):: A. No post acute care anticipated at this time   Special Needs:: No Special Needs [1]

## 2023-10-28 PROBLEM — D72.829 LEUCOCYTOSIS: Status: ACTIVE | Noted: 2023-10-28

## 2023-10-28 PROBLEM — E83.42 HYPOMAGNESEMIA: Status: RESOLVED | Noted: 2023-10-28 | Resolved: 2023-10-28

## 2023-10-28 PROBLEM — E83.42 HYPOMAGNESEMIA: Status: ACTIVE | Noted: 2023-10-28

## 2023-10-28 PROBLEM — E43 SEVERE MALNUTRITION: Status: ACTIVE | Noted: 2023-10-28

## 2023-10-28 PROBLEM — R79.89 ABNORMAL THYROID BLOOD TEST: Status: ACTIVE | Noted: 2023-10-28

## 2023-10-28 LAB
25(OH)D3+25(OH)D2 SERPL-MCNC: 11 NG/ML (ref 30–96)
ALBUMIN SERPL BCP-MCNC: 2.1 G/DL (ref 3.5–5.2)
AMMONIA PLAS-SCNC: 29 UMOL/L (ref 10–50)
ANION GAP SERPL CALC-SCNC: 14 MMOL/L (ref 8–16)
B-OH-BUTYR BLD STRIP-SCNC: 0 MMOL/L (ref 0–0.5)
BASOPHILS # BLD AUTO: 0.07 K/UL (ref 0–0.2)
BASOPHILS NFR BLD: 0.3 % (ref 0–1.9)
BUN SERPL-MCNC: 17 MG/DL (ref 8–23)
CALCIUM SERPL-MCNC: 8.7 MG/DL (ref 8.7–10.5)
CHLORIDE SERPL-SCNC: 101 MMOL/L (ref 95–110)
CO2 SERPL-SCNC: 26 MMOL/L (ref 23–29)
CREAT SERPL-MCNC: 0.8 MG/DL (ref 0.5–1.4)
DIFFERENTIAL METHOD: ABNORMAL
EOSINOPHIL # BLD AUTO: 0.3 K/UL (ref 0–0.5)
EOSINOPHIL NFR BLD: 1.3 % (ref 0–8)
ERYTHROCYTE [DISTWIDTH] IN BLOOD BY AUTOMATED COUNT: 16.5 % (ref 11.5–14.5)
EST. GFR  (NO RACE VARIABLE): >60 ML/MIN/1.73 M^2
FOLATE SERPL-MCNC: 5.9 NG/ML (ref 4–24)
GLUCOSE SERPL-MCNC: 154 MG/DL (ref 70–110)
HCT VFR BLD AUTO: 31.4 % (ref 40–54)
HGB BLD-MCNC: 9.9 G/DL (ref 14–18)
IMM GRANULOCYTES # BLD AUTO: 0.21 K/UL (ref 0–0.04)
IMM GRANULOCYTES NFR BLD AUTO: 1 % (ref 0–0.5)
LYMPHOCYTES # BLD AUTO: 3.4 K/UL (ref 1–4.8)
LYMPHOCYTES NFR BLD: 16.6 % (ref 18–48)
MAGNESIUM SERPL-MCNC: 2.2 MG/DL (ref 1.6–2.6)
MCH RBC QN AUTO: 26.6 PG (ref 27–31)
MCHC RBC AUTO-ENTMCNC: 31.5 G/DL (ref 32–36)
MCV RBC AUTO: 84 FL (ref 82–98)
MONOCYTES # BLD AUTO: 0.9 K/UL (ref 0.3–1)
MONOCYTES NFR BLD: 4.5 % (ref 4–15)
NEUTROPHILS # BLD AUTO: 15.7 K/UL (ref 1.8–7.7)
NEUTROPHILS NFR BLD: 76.3 % (ref 38–73)
NRBC BLD-RTO: 0 /100 WBC
PHOSPHATE SERPL-MCNC: 3.6 MG/DL (ref 2.7–4.5)
PHOSPHATE SERPL-MCNC: 3.6 MG/DL (ref 2.7–4.5)
PLATELET # BLD AUTO: 441 K/UL (ref 150–450)
PMV BLD AUTO: 9 FL (ref 9.2–12.9)
POCT GLUCOSE: 146 MG/DL (ref 70–110)
POCT GLUCOSE: 152 MG/DL (ref 70–110)
POCT GLUCOSE: 174 MG/DL (ref 70–110)
POCT GLUCOSE: 190 MG/DL (ref 70–110)
POTASSIUM SERPL-SCNC: 3.9 MMOL/L (ref 3.5–5.1)
PTH-INTACT SERPL-MCNC: 52 PG/ML (ref 9–77)
RBC # BLD AUTO: 3.72 M/UL (ref 4.6–6.2)
SODIUM SERPL-SCNC: 141 MMOL/L (ref 136–145)
T3 SERPL-MCNC: 58 NG/DL (ref 60–180)
VIT B12 SERPL-MCNC: 345 PG/ML (ref 210–950)
WBC # BLD AUTO: 20.56 K/UL (ref 3.9–12.7)

## 2023-10-28 PROCEDURE — 82746 ASSAY OF FOLIC ACID SERUM: CPT | Performed by: HOSPITALIST

## 2023-10-28 PROCEDURE — 83735 ASSAY OF MAGNESIUM: CPT | Performed by: HOSPITALIST

## 2023-10-28 PROCEDURE — 85025 COMPLETE CBC W/AUTO DIFF WBC: CPT | Performed by: INTERNAL MEDICINE

## 2023-10-28 PROCEDURE — 80069 RENAL FUNCTION PANEL: CPT | Performed by: HOSPITALIST

## 2023-10-28 PROCEDURE — 84480 ASSAY TRIIODOTHYRONINE (T3): CPT | Performed by: HOSPITALIST

## 2023-10-28 PROCEDURE — 83970 ASSAY OF PARATHORMONE: CPT | Performed by: HOSPITALIST

## 2023-10-28 PROCEDURE — 63600175 PHARM REV CODE 636 W HCPCS: Performed by: HOSPITALIST

## 2023-10-28 PROCEDURE — 82010 KETONE BODYS QUAN: CPT | Performed by: HOSPITALIST

## 2023-10-28 PROCEDURE — 82607 VITAMIN B-12: CPT | Performed by: HOSPITALIST

## 2023-10-28 PROCEDURE — 82306 VITAMIN D 25 HYDROXY: CPT | Performed by: HOSPITALIST

## 2023-10-28 PROCEDURE — 84207 ASSAY OF VITAMIN B-6: CPT | Performed by: HOSPITALIST

## 2023-10-28 PROCEDURE — 25000003 PHARM REV CODE 250: Performed by: HOSPITALIST

## 2023-10-28 PROCEDURE — 84425 ASSAY OF VITAMIN B-1: CPT | Performed by: HOSPITALIST

## 2023-10-28 PROCEDURE — 25000242 PHARM REV CODE 250 ALT 637 W/ HCPCS: Performed by: HOSPITALIST

## 2023-10-28 PROCEDURE — 82140 ASSAY OF AMMONIA: CPT | Performed by: HOSPITALIST

## 2023-10-28 PROCEDURE — 25000003 PHARM REV CODE 250: Performed by: INTERNAL MEDICINE

## 2023-10-28 PROCEDURE — 21400001 HC TELEMETRY ROOM

## 2023-10-28 RX ORDER — FLUTICASONE PROPIONATE 50 MCG
2 SPRAY, SUSPENSION (ML) NASAL DAILY
Status: DISCONTINUED | OUTPATIENT
Start: 2023-10-28 | End: 2023-10-29 | Stop reason: HOSPADM

## 2023-10-28 RX ORDER — LOSARTAN POTASSIUM 25 MG/1
25 TABLET ORAL DAILY
Status: DISCONTINUED | OUTPATIENT
Start: 2023-10-29 | End: 2023-10-29 | Stop reason: HOSPADM

## 2023-10-28 RX ORDER — CHOLECALCIFEROL (VITAMIN D3) 25 MCG
1000 TABLET ORAL DAILY
Status: DISCONTINUED | OUTPATIENT
Start: 2023-10-28 | End: 2023-10-29 | Stop reason: HOSPADM

## 2023-10-28 RX ORDER — LIDOCAINE 50 MG/G
1 PATCH TOPICAL
Status: DISCONTINUED | OUTPATIENT
Start: 2023-10-28 | End: 2023-10-28

## 2023-10-28 RX ADMIN — CALCIUM GLUCONATE 1 G: 20 INJECTION, SOLUTION INTRAVENOUS at 12:10

## 2023-10-28 RX ADMIN — MAGNESIUM SULFATE HEPTAHYDRATE 2 G: 40 INJECTION, SOLUTION INTRAVENOUS at 01:10

## 2023-10-28 RX ADMIN — ENOXAPARIN SODIUM 40 MG: 40 INJECTION SUBCUTANEOUS at 05:10

## 2023-10-28 RX ADMIN — GABAPENTIN 600 MG: 300 CAPSULE ORAL at 03:10

## 2023-10-28 RX ADMIN — CHOLECALCIFEROL TAB 25 MCG (1000 UNIT) 1000 UNITS: 25 TAB at 03:10

## 2023-10-28 RX ADMIN — SENNOSIDES AND DOCUSATE SODIUM 1 TABLET: 8.6; 5 TABLET ORAL at 09:10

## 2023-10-28 RX ADMIN — GABAPENTIN 600 MG: 300 CAPSULE ORAL at 09:10

## 2023-10-28 RX ADMIN — HYDROCODONE BITARTRATE AND ACETAMINOPHEN 1 TABLET: 7.5; 325 TABLET ORAL at 09:10

## 2023-10-28 RX ADMIN — BACLOFEN 5 MG: 5 TABLET ORAL at 09:10

## 2023-10-28 RX ADMIN — ACETAMINOPHEN 650 MG: 325 TABLET ORAL at 03:10

## 2023-10-28 RX ADMIN — HYDROCODONE BITARTRATE AND ACETAMINOPHEN 1 TABLET: 7.5; 325 TABLET ORAL at 05:10

## 2023-10-28 RX ADMIN — ATORVASTATIN CALCIUM 20 MG: 20 TABLET, FILM COATED ORAL at 09:10

## 2023-10-28 RX ADMIN — FLUTICASONE PROPIONATE 100 MCG: 50 SPRAY, METERED NASAL at 05:10

## 2023-10-28 RX ADMIN — HYDROCODONE BITARTRATE AND ACETAMINOPHEN 1 TABLET: 7.5; 325 TABLET ORAL at 01:10

## 2023-10-28 RX ADMIN — INSULIN ASPART 1 UNITS: 100 INJECTION, SOLUTION INTRAVENOUS; SUBCUTANEOUS at 01:10

## 2023-10-28 RX ADMIN — ENOXAPARIN SODIUM 40 MG: 40 INJECTION SUBCUTANEOUS at 01:10

## 2023-10-28 RX ADMIN — POTASSIUM CHLORIDE 50 MEQ: 10 CAPSULE, COATED, EXTENDED RELEASE ORAL at 01:10

## 2023-10-28 RX ADMIN — MAGNESIUM SULFATE HEPTAHYDRATE 2 G: 40 INJECTION, SOLUTION INTRAVENOUS at 03:10

## 2023-10-28 RX ADMIN — Medication 6 MG: at 01:10

## 2023-10-28 RX ADMIN — BACLOFEN 5 MG: 5 TABLET ORAL at 01:10

## 2023-10-28 NOTE — SUBJECTIVE & OBJECTIVE
Interval History:  Patient feels better, still feels a little weak.  Electrolytes corrected.  He sees [facility].  Discussed with dermatologist, he has no acute changes at this time they do need to see him inpatient.    Review of Systems   All other systems reviewed and are negative.    Objective:     Vital Signs (Most Recent):  Temp: 98.7 °F (37.1 °C) (10/28/23 1138)  Pulse: (!) 58 (10/28/23 1138)  Resp: 18 (10/28/23 1138)  BP: 135/63 (10/28/23 1138)  SpO2: (!) 92 % (10/28/23 1138) Vital Signs (24h Range):  Temp:  [97.8 °F (36.6 °C)-100.1 °F (37.8 °C)] 98.7 °F (37.1 °C)  Pulse:  [55-87] 58  Resp:  [17-20] 18  SpO2:  [90 %-98 %] 92 %  BP: (124-169)/(61-85) 135/63     Weight: 103.8 kg (228 lb 14.4 oz)  Body mass index is 35.85 kg/m².    Intake/Output Summary (Last 24 hours) at 10/28/2023 1411  Last data filed at 10/28/2023 0800  Gross per 24 hour   Intake 480 ml   Output --   Net 480 ml      Physical Exam  Constitutional:       General: He is not in acute distress.     Appearance: He is obese.   HENT:      Head: Normocephalic.      Right Ear: External ear normal.      Left Ear: External ear normal.      Nose: Nose normal.   Cardiovascular:      Rate and Rhythm: Normal rate.   Pulmonary:      Effort: Pulmonary effort is normal.   Abdominal:      Palpations: Abdomen is soft.      Tenderness: There is no abdominal tenderness.   Musculoskeletal:      Comments: Appears to be slightly lethargic   Skin:     Comments: Chronic changes, no acute changes to armpits   Neurological:      Mental Status: He is alert and oriented to person, place, and time.   Psychiatric:         Mood and Affect: Mood normal.         Computed MELD 3.0 unavailable. Necessary lab results were not found in the last year.  Computed MELD-Na unavailable. Necessary lab results were not found in the last year.      Significant Labs:  CBC:  Recent Labs   Lab 10/27/23  1745   WBC 17.12*   HGB 10.0*   HCT 34.3*        CMP:  Recent Labs   Lab  "10/27/23  1745 10/28/23  0742    141   K <2.0* 3.9   * 101   CO2 16* 26   GLU 66* 154*   BUN 10 17   CREATININE 0.4* 0.8   CALCIUM 5.0* 8.7   PROT 4.4*  --    ALBUMIN 1.4* 2.1*   BILITOT 0.2  --    ALKPHOS 72  --    AST 7*  --    ALT 8*  --    ANIONGAP 7* 14     PTINR:  No results for input(s): "INR" in the last 48 hours.    Significant Procedures:   Dobutamine Stress Test with Color Flow: No results found for this or any previous visit.      "

## 2023-10-28 NOTE — NURSING
"Pt reported feeling congested on the right side of his nose. Pt verbalized "it feels like it is on the right side of my whole face and my right ear is affected too". Pt reported that he also has nasal drip and would like to have medication intervention. Monica, DO informed via secured chat.  "

## 2023-10-28 NOTE — ASSESSMENT & PLAN NOTE
Low TSH and T3 noted.  T4 normal.  We will obtain endocrine consult for evaluation of central hypothyroidism

## 2023-10-28 NOTE — ASSESSMENT & PLAN NOTE
Also his reported increased somnolence and lethargy per his wife, may have been exacerbated by bouts of hypoglycemia and potential polypharmacy.

## 2023-10-28 NOTE — NURSING
Received pt awake, alert, oriented x4 via stretcher fr ED, with fiancee at the bedside. Pt w/ spontaneous non-labored breathing. Pt on room air, saturating at 95%-97%. Pt placed on cardiac telemetry box as ordered. Radial pulse regular, abdominal sounds active and lung sounds diminished on all quadrants. Noted dry blister on sacral wound and moisture associated wound on the bilateral axilla. Pt w/ 20g on the left forearm, intact. Pt oriented to room and call light. All needs attended.

## 2023-10-28 NOTE — CONSULTS
"Nutrition Care Consult    RD consulted for "concern for malnutrition drop in albumin over months- severe change in last year". Please note: Albumin level indicates increased risk morbidity/mortality but is no longer used as a direct marker, determinant or characteristic of protein-calorie malnutrition or nutritional status (It more closely reflects overall inflammatory status related to the metabolic stress response). Serum albumin and pre-albumin are negative acute phase reactants and are known to decrease in the presence of inflammation/injury/chronic illness. Albumin half-life is about 3 weeks, and pre-albumin is 2-3 days. Increasing patients protein intake will not necessarily have any effect on albumin level.    Pt reports he typically eats 3 meals/day at home and is trying to eat healthier by reducing the amount he eats and including more salads. RN states that current PO intake of breakfast was 50% and that pt also ate a turkey sandwich and a bowl of cereal with a carton of milk before 5 am. No issues with n/v/d/c/chewing/swallowing. Wt on 1/12/23 was 245# and # - indicates 7% wt loss x 10 months (not significant per ASPEN guideline). No s/s of malnutrition at this time. LBM 10/28. RD to continue to monitor and follow. Please see recommendations below.     Thanks!  Sarah Patel RDN,LDN    Recommendations    Continue current diabetic (2000 kcal diet)- encourage adequate PO intake.   If PO intake <50%, add Boost Glucose BID.   RD following.    Goals: Will meet % EEN/EPN by next RD f/u.   Nutrition Goal Status: new  Communication of RD Recs: POC      "

## 2023-10-28 NOTE — H&P
[facility] ([address] Medicine  History & Physical    Patient Name: Christiano Dahl  MRN: [id number]  Patient Class: IP- Inpatient  Admission Date: 10/27/2023  Attending Physician: Oneil Myers MD [facility]  Admitting Physician: Gonzalo Newman MD  Primary Care Provider: Jaci Newton MD         Patient information was obtained from patient, spouse/SO and ER records.     Subjective:     Principal Problem:Hypocalcemia    Chief Complaint:   Chief Complaint   Patient presents with    Weakness     Progressive weakness over the past few days, +orthostatic with standing BP 88/56, IVF given in route 500 ns        HPI: 62-year-old  man with a type 2 diabetes, hypertension, obesity, chronic pain, hidradenitis suppurativa on Remicade infusion presents to the emergency department for concerns of mental status change.    He is accompanied by his fiancee Keyur Alcazar.   Initial events today, bradye contacted patient and via phone call she was concerned that he was disoriented and had slurred speech.  EMS was contacted, per ED notes EMS found patient with orthostasis blood pressure was 88/56 when standing and he received 500 cc IV fluids EN route, on arrival patient was noted to be drowsy and had constricted pupils on ED exam.    Recent history per patient and significant other is that over the last 2 weeks he has had excessive somnolence, sleeping often throughout the day.  Patient is not employed, stays at home most of the time he states that he will intermittently stay up till approximately 4:00 a.m. sleep.  He does note that he will feel sleepy during the daytime if he was out of the house or had specific responsibilities he denies having feelings of being excessively tired or falling asleep.  He does not feel refreshed after sleep at night when he is sleeping at night he has approximate 3-4 bouts of nocturia say notes she believes it is closer to 5-6.  Patient with  possible history of sleep apnea but does not use any CPAP, he states that he needs to have a sleep study as an outpatient.    On lab studies in the emergency department he is found with severe hypokalemia unmeasurable than 2.0 and severe hypocalcemia serum level of 5.0 that corrects based on albumin level 7.08.  Patient denies having any muscle spasms.    For his hidradenitis suppurativa which primarily affects his bilateral sella also occasionally inguinal areas receives Remicade infusion in the [facility] [employer] system every 4 weeks, has been on this medication for 4 years dosages that have been titrated.  He states he feels that symptoms are controlled for 2 weeks after in infusion is given before he starts having increasing pain lateral axilla.  He takes Norco 7.5 mg p.r.n. as well as gabapentin 800 mg t.i.d., gabapentin dose titrated recently from 600 mg dosage.    He reports performing all ADLs at home, states in recent months he has been trying to eat healthier usually eats 3 meals a day, is trying to eat healthier by reducing the amount he eats trying to eat salads.    Pts essie had COVID infection about 3 weeks ago, she was tested at urgent care, patient had similar symptoms of cough and sore throat he believes he also had covid.  Took no other medications and reports symptoms have improved.     ED treatment Haldol 10 mg IV, LR 1 L, normal saline 1 L, methocarbamol 1 g, 40 mEq oral potassium bicarbonate, potassium chloride IV 10 mEq, 1 g calcium gluconate.      Past Medical History:   Diagnosis Date    C. difficile colitis 2013    hospitalized for 9 days    Diabetes mellitus     Diverticulitis 2014    required hospitalization    Hypertension     Pneumonia 2014       Past Surgical History:   Procedure Laterality Date    ABCESS DRAINAGE  03/2016    perineum       Review of patient's allergies indicates:  No Known Allergies    No current facility-administered medications on file prior to encounter.     Current  Outpatient Medications on File Prior to Encounter   Medication Sig    acetaminophen (TYLENOL) 500 MG tablet Take 2 tablets (1,000 mg total) by mouth every 8 (eight) hours as needed for Pain.    atorvastatin (LIPITOR) 20 MG tablet Take 20 mg by mouth once daily.    baclofen (LIORESAL) 10 MG tablet Take 10 mg by mouth 2 (two) times daily.    chlorthalidone (HYGROTEN) 50 MG Tab TAKE 1 TABLET(50 MG) BY MOUTH EVERY DAY (Patient taking differently: Take 50 mg by mouth once daily.)    gabapentin (NEURONTIN) 800 MG tablet Take 800 mg by mouth 3 (three) times daily.    HYDROcodone-acetaminophen (NORCO) 7.5-325 mg per tablet Take 1 tablet by mouth 3 (three) times daily as needed for Pain.    losartan (COZAAR) 100 MG tablet TAKE 1 TABLET(100 MG) BY MOUTH EVERY DAY FOR BLOOD PRESSURE (Patient taking differently: Take 100 mg by mouth once daily. TAKE 1 TABLET(100 MG) BY MOUTH EVERY DAY FOR BLOOD PRESSURE)    metFORMIN (GLUCOPHAGE-XR) 500 MG ER 24hr tablet Take 1,000 mg by mouth every morning.    sodium chloride 0.9% SolP with inFLIXimab 100 mg SolR 5 mg/kg Inject 800 mg into the vein every 28 days.    CONTOUR TEST STRIPS Strp Use to check blood sugar once daily    LIDOcaine (LIDODERM) 5 % Place 1 patch onto the skin once daily. Remove & Discard patch within 12 hours or as directed by MD     Family History       Problem Relation (Age of Onset)    Cancer Paternal Grandmother    Diabetes Father, Paternal Grandfather, Other    Heart disease Mother          Tobacco Use    Smoking status: Every Day     Current packs/day: 1.00     Average packs/day: 1 pack/day for 30.0 years (30.0 ttl pk-yrs)     Types: Cigarettes    Smokeless tobacco: Never    Tobacco comments:     30 years, has quit a few times in the past   Substance and Sexual Activity    Alcohol use: Yes     Comment: soc    Drug use: Not on file     Comment: MJ daily    Sexual activity: Yes     Partners: Female     Comment: in a relationship     Review of Systems   Constitutional:   Negative for activity change, chills, fatigue and fever.   HENT:  Negative for sore throat and trouble swallowing.    Respiratory: Negative.     Cardiovascular: Negative.    Gastrointestinal:  Negative for abdominal pain, diarrhea, nausea and vomiting.   Genitourinary:  Negative for difficulty urinating and frequency.   Neurological:  Negative for weakness.     Objective:     Vital Signs (Most Recent):  Temp: 99.8 °F (37.7 °C) (10/28/23 0347)  Pulse: 71 (10/28/23 0347)  Resp: 20 (10/28/23 0347)  BP: 131/61 (10/28/23 0347)  SpO2: (!) 92 % (10/28/23 0347) Vital Signs (24h Range):  Temp:  [98.9 °F (37.2 °C)-100.1 °F (37.8 °C)] 99.8 °F (37.7 °C)  Pulse:  [68-87] 71  Resp:  [18-20] 20  SpO2:  [92 %-98 %] 92 %  BP: (124-169)/(61-85) 131/61     Weight: 103.8 kg (228 lb 14.4 oz)  Body mass index is 35.85 kg/m².     Physical Exam        Physical Exam  Vitals and nursing note reviewed.   Constitutional:       Appearance: He is obese. He is ill-appearing.   HENT:      Head: Normocephalic and atraumatic.      Mouth/Throat:      Mouth: Mucous membranes are moist.      Pharynx: No oropharyngeal exudate.   Eyes:      General: No scleral icterus.     Pupils: Pupils are equal, round, and reactive to light.   Cardiovascular:      Rate and Rhythm: Normal rate and regular rhythm.      Pulses: Normal pulses.   Pulmonary:      Effort: Pulmonary effort is normal. No respiratory distress.      Breath sounds: No wheezing or rales.   Abdominal:      General: Abdomen is protuberant. Bowel sounds are normal. There is distension.      Palpations: Abdomen is soft.      Tenderness: There is no abdominal tenderness.   Musculoskeletal:      Cervical back: Neck supple.      Right lower leg: No edema.      Left lower leg: No edema.   Skin:     General: Skin is warm and dry.      Comments: Bilateral axilla appear with macerated appearance, hypopigmentation, moist appearance, malodor, follicular changes of hidradenitis suppuritiva   Neurological:       "Mental Status: He is alert and oriented to person, place, and time.      Comments: No asterixis   Psychiatric:         Mood and Affect: Mood normal.         Behavior: Behavior normal.                Significant Labs: All pertinent labs within the past 24 hours have been reviewed.  ABGs:       Recent Labs   Lab 10/27/23  1723   PH 7.354   PCO2 41.0   HCO3 22.8*   POCSATURATED 55   BE -3*   PO2 30*      CBC:       Recent Labs   Lab 10/27/23  1745   WBC 17.12*   HGB 10.0*   HCT 34.3*         CMP:       Recent Labs   Lab 10/27/23  1745      K <2.0*   *   CO2 16*   GLU 66*   BUN 10   CREATININE 0.4*   CALCIUM 5.0*   PROT 4.4*   ALBUMIN 1.4*   BILITOT 0.2   ALKPHOS 72   AST 7*   ALT 8*   ANIONGAP 7*      Cardiac Markers: No results for input(s): "CKMB", "MYOGLOBIN", "BNP", "TROPISTAT" in the last 48 hours.  Coagulation: No results for input(s): "PT", "INR", "APTT" in the last 48 hours.  Lactic Acid: No results for input(s): "LACTATE" in the last 48 hours.  Magnesium:       Recent Labs   Lab 10/27/23  1745   MG 1.0*      Troponin:       Recent Labs   Lab 10/27/23  1745   TROPONINI <0.006      Urine Studies:       Recent Labs   Lab 10/27/23  1900   COLORU Yellow   APPEARANCEUA Clear   PHUR 6.0   SPECGRAV 1.030   PROTEINUA Trace*   GLUCUA Negative   KETONESU Negative   BILIRUBINUA Negative   OCCULTUA Negative   NITRITE Negative   LEUKOCYTESUR Negative         Significant Imaging: I have reviewed all pertinent imaging results/findings within the past 24 hours.     XR CHEST AP PORTABLE     CLINICAL HISTORY:  Weakness     TECHNIQUE:  Single frontal view of the chest was performed.     COMPARISON:  08/31/2023     FINDINGS:  The cardiomediastinal silhouette is prominent, magnified technique noting calcification of the aorta.  There is elevation of the right hemidiaphragm..  There is no pleural effusion.  The trachea is midline.  The lungs are symmetrically expanded bilaterally with coarse interstitial " "attenuation bilaterally..  No large focal consolidation seen.  There is no pneumothorax.  The osseous structures are remarkable for degenerative change..     Impression:     1. Interstitial findings suggest edema noting shallow inspiratory effort.  No large focal consolidation.        Electronically signed by: Jarrod Mitchell MD  Date:                                            10/27/2023  Time:                                           19:06          Assessment/Plan:     * Hypocalcemia  -etiology is unclear.  Check PTH level, Vitamin D 25-OH level  -suspect etiology may be nutritional deficiency given his concurrent hypokalemia & hypomagnesemia.  A phosphorus level is pending this morning   -total 2gram of IV Calcium gluconate ordered tonight   -EKG reviewed today and QTc no prolonged.  No cardiac arrhythmia or muscle spasms notes.       Will replace calcium and monitor electrolytes closely. The latest calcium labs have been reviewed and are listed below.  Recent Labs   Lab 10/27/23  1745   CALCIUM 5.0*       No results for input(s): "CAION" in the last 24 hours.      Hypokalemia  Patient has hypokalemia which is Acute and currently uncontrolled. Most recent potassium levels reviewed-   Lab Results   Component Value Date    K <2.0 (LL) 10/27/2023   . Will continue potassium replacement per protocol and recheck repeat levels after replacement completed.     Patient is taking Chlorthalidone 50mg, hold at this time given hypokalemia/hypomagnesemia  -treat concurrent hypomagnesemia   -maintain on telemetry monitoring.   -Check Urine electrolytes for Non-gap metabolic acidosis also if a Transtubular potassium gradient calculation needed as part of workup.         Hypoglycemia  Suspect due to reduced diet intake, improved with oral feedings in the ED.   -=potentially contributed to his report or trying to eat healther and restricting foods too drastically.      Transient neurological symptoms  Also his reported increased " somnolence and lethargy per his wife, may have been exacerbated by bouts of hypoglycemia and potential polypharmacy. He has recent titration of gabapentin dosage     -his VBG did not show any hypercapnia    -He also has on review of labs a significant drop in albumin levels within the last year and occurring as recently as last 2 months.     MRI brain pending to rule out stroke f/u results    Hidradenitis suppurativa  -patient on outpatient remicade infusions  -patient with areas in bilateral axilla of disease, but suspect he has superimposed intertrigo at this time.   -consult dermatology to assist in determining best treatment course/ evaluation for these issues.       Intertrigo  -patient with areas in bilateral axilla of disease, but suspect he has superimposed intertrigo at this time.   -consult dermatology to assist in determining best treatment course/ evaluation for these issues.     -Discuss if topical powders alone may suffice however if not, he may need moisture absorbing products like Inter-Dry that our wound care service may be able to provide for patient to prevent further maceration, although this type of dressing may be difficulty to maintain under the arms when pt is moving arms regularly.     Controlled type 2 diabetes mellitus without complication, without long-term current use of insulin  Patient's FSGs are uncontrolled due to hypoglycemia on current medication regimen.  Last A1c reviewed-   Lab Results   Component Value Date    HGBA1C 8.1 (H) 10/05/2023     Most recent fingerstick glucose reviewed-   Recent Labs   Lab 10/27/23  2355   POCTGLUCOSE 215*     Current correctional scale  Low  Maintain anti-hyperglycemic dose as follows-   Antihyperglycemics (From admission, onward)      Start     Stop Route Frequency Ordered    10/27/23 2116  insulin aspart U-100 pen 0-5 Units         -- SubQ Before meals & nightly PRN 10/27/23 2016          Hold Oral hypoglycemics while patient is in the  hospital.    Hypoglycemic on arrival could cause patient's neurologic symptoms,  He is oriented and providing history at bedside, last glucose 95.     Essential hypertension  Chronic, controlled. Latest blood pressure and vitals reviewed-     Temp:  [98.9 °F (37.2 °C)-100.1 °F (37.8 °C)]   Pulse:  [68-87]   Resp:  [18-20]   BP: (124-169)/(61-85)   SpO2:  [92 %-98 %] .   Home meds for hypertension were reviewed and noted below.   Hypertension Medications               chlorthalidone (HYGROTEN) 50 MG Tab TAKE 1 TABLET(50 MG) BY MOUTH EVERY DAY    losartan (COZAAR) 100 MG tablet TAKE 1 TABLET(100 MG) BY MOUTH EVERY DAY FOR BLOOD PRESSURE            While in the hospital, will manage blood pressure as follows; Adjust home antihypertensive regimen as follows- HOLD CHLORTHALIDOne  due to hypokalemia.   Continue Losartan.         Severe malnutrition  Patient has critical hypokalemia, severe hypocalcemia and hypomagnesemia,  Low albumin  -No proteinuria noted on UA  -concern that poor oral intake is the cause as he is not complaining of any chronic nausea/vomiting or diarrhea symptoms that might contribute to severe malnutrition.   -check other micronutrient serum levels   -check thyroid function studies.     Consult nutrition as inpatient - he may require a calorie count and also continue f/u with nutrition as outpatient.     VTE Risk Mitigation (From admission, onward)           Ordered     enoxaparin injection 40 mg  Daily         10/27/23 2216     IP VTE HIGH RISK PATIENT  Once         10/27/23 2216     Place sequential compression device  Until discontinued         10/27/23 2216                       Gonzalo Newman MD  Department of Hospital Medicine  [facility] - Intensive Care ([place]

## 2023-10-28 NOTE — NURSING
Nurses Note -- 4 Eyes      10/28/2023   12:00 AM      Skin assessed during: Admit      [] No Altered Skin Integrity Present    []Prevention Measures Documented      [x] Yes- Altered Skin Integrity Present or Discovered   [x] LDA Added if Not in Epic (Describe Wound)   [x] New Altered Skin Integrity was Present on Admit and Documented in LDA   [] Wound Image Taken    Wound Care Consulted? Yes    Attending Nurse:  Anna Monroe RN    Second RN/Staff Member:  Kamille Morrell RN

## 2023-10-28 NOTE — ASSESSMENT & PLAN NOTE
Patient's FSGs are uncontrolled due to hypoglycemia on current medication regimen.  Last A1c reviewed-   Lab Results   Component Value Date    HGBA1C 8.1 (H) 10/05/2023     Most recent fingerstick glucose reviewed-   Recent Labs   Lab 10/27/23  2355   POCTGLUCOSE 215*     Current correctional scale  Low  Maintain anti-hyperglycemic dose as follows-   Antihyperglycemics (From admission, onward)    Start     Stop Route Frequency Ordered    10/27/23 2116  insulin aspart U-100 pen 0-5 Units         -- SubQ Before meals & nightly PRN 10/27/23 2016        Hold Oral hypoglycemics while patient is in the hospital.    Hypoglycemic on arrival could cause patient's neurologic symptoms,  He is oriented and providing history at bedside, last glucose 95.

## 2023-10-28 NOTE — ASSESSMENT & PLAN NOTE
Suspect due to reduced diet intake, improved with oral feedings in the ED.   -=potentially contributed to his report or trying to eat healther and restricting foods too drastically.

## 2023-10-28 NOTE — HPI
62-year-old  man with a type 2 diabetes, hypertension, obesity, chronic pain, hidradenitis suppurativa on Remicade infusion presents to the emergency department for concerns of mental status change.    He is accompanied by his essie Alcazar.   Initial events today, essie contacted patient and via phone call she was concerned that he was disoriented and had slurred speech.  EMS was contacted, per ED notes EMS found patient with orthostasis blood pressure was 88/56 when standing and he received 500 cc IV fluids EN route, on arrival patient was noted to be drowsy and had constricted pupils on ED exam.    Recent history per patient and significant other is that over the last 2 weeks he has had excessive somnolence, sleeping often throughout the day.  Patient is not employed, stays at home most of the time he states that he will intermittently stay up till approximately 4:00 a.m. sleep.  He does note that he will feel sleepy during the daytime if he was out of the house or had specific responsibilities he denies having feelings of being excessively tired or falling asleep.  He does not feel refreshed after sleep at night when he is sleeping at night he has approximate 3-4 bouts of nocturia say notes she believes it is closer to 5-6.  Patient with possible history of sleep apnea but does not use any CPAP, he states that he needs to have a sleep study as an outpatient.    On lab studies in the emergency department he is found with severe hypokalemia unmeasurable than 2.0 and severe hypocalcemia serum level of 5.0 that corrects based on albumin level 7.08.  Patient denies having any muscle spasms.    For his hidradenitis suppurativa which primarily affects his bilateral sella also occasionally inguinal areas receives Remicade infusion in the [facility] system every 4 weeks, has been on this medication for 4 years dosages that have been titrated.  He states he feels that symptoms are controlled for 2  weeks after in infusion is given before he starts having increasing pain lateral axilla.  He takes Norco 7.5 mg p.r.n. as well as gabapentin 800 mg t.i.d., gabapentin dose titrated recently from 600 mg dosage.    He reports performing all ADLs at home, states in recent months he has been trying to eat healthier usually eats 3 meals a day, is trying to eat healthier by reducing the amount he eats trying to eat salads.    Pts essie had COVID infection about 3 weeks ago, she was tested at urgent care, patient had similar symptoms of cough and sore throat he believes he also had covid.  Took no other medications and reports symptoms have improved.     ED treatment Haldol 10 mg IV, LR 1 L, normal saline 1 L, methocarbamol 1 g, 40 mEq oral potassium bicarbonate, potassium chloride IV 10 mEq, 1 g calcium gluconate.

## 2023-10-28 NOTE — ASSESSMENT & PLAN NOTE
-patient with areas in bilateral axilla of disease, but suspect he has superimposed intertrigo at this time.   -consult dermatology to assist in determining best treatment course/ evaluation for these issues.     -Discuss if topical powders alone may suffice however if not, he may need moisture absorbing products like Inter-Dry that our wound care service may be able to provide for patient to prevent further maceration, although this type of dressing may be difficulty to maintain under the arms when pt is moving arms regularly.

## 2023-10-28 NOTE — NURSING
Pt complains of right buttocks pain. Pt describes as sharp that has been going on for 4 weeks. Pt says it hurts even when it is not pressed on/palpated. Pain score is 9/10. DO Monica informed via Secured Chat.

## 2023-10-28 NOTE — ASSESSMENT & PLAN NOTE
Patient has hypokalemia which is Acute and currently uncontrolled. Most recent potassium levels reviewed-   Lab Results   Component Value Date    K <2.0 (LL) 10/27/2023   . Will continue potassium replacement per protocol and recheck repeat levels after replacement completed.     Patient is taking Chlorthalidone 50mg, hold at this time given hypokalemia/hypomagnesemia  -treat concurrent hypomagnesemia   -maintain on telemetry monitoring.   -Check Urine electrolytes for Non-gap metabolic acidosis also if a Transtubular potassium gradient calculation needed as part of workup.

## 2023-10-28 NOTE — ASSESSMENT & PLAN NOTE
Low TSH and T3 noted.  T4 normal.  Likely sick euthyroid.  Discussed with Endocrine, they recommended follow up as an outpatient with repeat thyroid function test.

## 2023-10-28 NOTE — SUBJECTIVE & OBJECTIVE
Past Medical History:   Diagnosis Date    C. difficile colitis 2013    hospitalized for 9 days    Diabetes mellitus     Diverticulitis 2014    required hospitalization    Hypertension     Pneumonia 2014       Past Surgical History:   Procedure Laterality Date    ABCESS DRAINAGE  03/2016    perineum       Review of patient's allergies indicates:  No Known Allergies    No current facility-administered medications on file prior to encounter.     Current Outpatient Medications on File Prior to Encounter   Medication Sig    acetaminophen (TYLENOL) 500 MG tablet Take 2 tablets (1,000 mg total) by mouth every 8 (eight) hours as needed for Pain.    atorvastatin (LIPITOR) 20 MG tablet Take 20 mg by mouth once daily.    baclofen (LIORESAL) 10 MG tablet Take 10 mg by mouth 2 (two) times daily.    chlorthalidone (HYGROTEN) 50 MG Tab TAKE 1 TABLET(50 MG) BY MOUTH EVERY DAY (Patient taking differently: Take 50 mg by mouth once daily.)    gabapentin (NEURONTIN) 800 MG tablet Take 800 mg by mouth 3 (three) times daily.    HYDROcodone-acetaminophen (NORCO) 7.5-325 mg per tablet Take 1 tablet by mouth 3 (three) times daily as needed for Pain.    losartan (COZAAR) 100 MG tablet TAKE 1 TABLET(100 MG) BY MOUTH EVERY DAY FOR BLOOD PRESSURE (Patient taking differently: Take 100 mg by mouth once daily. TAKE 1 TABLET(100 MG) BY MOUTH EVERY DAY FOR BLOOD PRESSURE)    metFORMIN (GLUCOPHAGE-XR) 500 MG ER 24hr tablet Take 1,000 mg by mouth every morning.    sodium chloride 0.9% SolP with inFLIXimab 100 mg SolR 5 mg/kg Inject 800 mg into the vein every 28 days.    CONTOUR TEST STRIPS Strp Use to check blood sugar once daily    LIDOcaine (LIDODERM) 5 % Place 1 patch onto the skin once daily. Remove & Discard patch within 12 hours or as directed by MD     Family History       Problem Relation (Age of Onset)    Cancer Paternal Grandmother    Diabetes Father, Paternal Grandfather, Other    Heart disease Mother          Tobacco Use    Smoking status:  Every Day     Current packs/day: 1.00     Average packs/day: 1 pack/day for 30.0 years (30.0 ttl pk-yrs)     Types: Cigarettes    Smokeless tobacco: Never    Tobacco comments:     30 years, has quit a few times in the past   Substance and Sexual Activity    Alcohol use: Yes     Comment: soc    Drug use: Not on file     Comment: MJ daily    Sexual activity: Yes     Partners: Female     Comment: in a relationship     Review of Systems   Constitutional:  Negative for activity change, chills, fatigue and fever.   HENT:  Negative for sore throat and trouble swallowing.    Respiratory: Negative.     Cardiovascular: Negative.    Gastrointestinal:  Negative for abdominal pain, diarrhea, nausea and vomiting.   Genitourinary:  Negative for difficulty urinating and frequency.   Neurological:  Negative for weakness.     Objective:     Vital Signs (Most Recent):  Temp: 99.8 °F (37.7 °C) (10/28/23 0347)  Pulse: 71 (10/28/23 0347)  Resp: 20 (10/28/23 0347)  BP: 131/61 (10/28/23 0347)  SpO2: (!) 92 % (10/28/23 0347) Vital Signs (24h Range):  Temp:  [98.9 °F (37.2 °C)-100.1 °F (37.8 °C)] 99.8 °F (37.7 °C)  Pulse:  [68-87] 71  Resp:  [18-20] 20  SpO2:  [92 %-98 %] 92 %  BP: (124-169)/(61-85) 131/61     Weight: 103.8 kg (228 lb 14.4 oz)  Body mass index is 35.85 kg/m².     Physical Exam        Physical Exam  Vitals and nursing note reviewed.   Constitutional:       Appearance: He is obese. He is ill-appearing.   HENT:      Head: Normocephalic and atraumatic.      Mouth/Throat:      Mouth: Mucous membranes are moist.      Pharynx: No oropharyngeal exudate.   Eyes:      General: No scleral icterus.     Pupils: Pupils are equal, round, and reactive to light.   Cardiovascular:      Rate and Rhythm: Normal rate and regular rhythm.      Pulses: Normal pulses.   Pulmonary:      Effort: Pulmonary effort is normal. No respiratory distress.      Breath sounds: No wheezing or rales.   Abdominal:      General: Abdomen is protuberant. Bowel sounds  "are normal. There is distension.      Palpations: Abdomen is soft.      Tenderness: There is no abdominal tenderness.   Musculoskeletal:      Cervical back: Neck supple.      Right lower leg: No edema.      Left lower leg: No edema.   Skin:     General: Skin is warm and dry.      Comments: Bilateral axilla appear with macerated appearance, hypopigmentation, moist appearance, malodor, follicular changes of hidradenitis suppuritiva   Neurological:      Mental Status: He is alert and oriented to person, place, and time.      Comments: No asterixis   Psychiatric:         Mood and Affect: Mood normal.         Behavior: Behavior normal.                Significant Labs: All pertinent labs within the past 24 hours have been reviewed.  ABGs:       Recent Labs   Lab 10/27/23  1723   PH 7.354   PCO2 41.0   HCO3 22.8*   POCSATURATED 55   BE -3*   PO2 30*      CBC:       Recent Labs   Lab 10/27/23  1745   WBC 17.12*   HGB 10.0*   HCT 34.3*         CMP:       Recent Labs   Lab 10/27/23  1745      K <2.0*   *   CO2 16*   GLU 66*   BUN 10   CREATININE 0.4*   CALCIUM 5.0*   PROT 4.4*   ALBUMIN 1.4*   BILITOT 0.2   ALKPHOS 72   AST 7*   ALT 8*   ANIONGAP 7*      Cardiac Markers: No results for input(s): "CKMB", "MYOGLOBIN", "BNP", "TROPISTAT" in the last 48 hours.  Coagulation: No results for input(s): "PT", "INR", "APTT" in the last 48 hours.  Lactic Acid: No results for input(s): "LACTATE" in the last 48 hours.  Magnesium:       Recent Labs   Lab 10/27/23  1745   MG 1.0*      Troponin:       Recent Labs   Lab 10/27/23  1745   TROPONINI <0.006      Urine Studies:       Recent Labs   Lab 10/27/23  1900   COLORU Yellow   APPEARANCEUA Clear   PHUR 6.0   SPECGRAV 1.030   PROTEINUA Trace*   GLUCUA Negative   KETONESU Negative   BILIRUBINUA Negative   OCCULTUA Negative   NITRITE Negative   LEUKOCYTESUR Negative         Significant Imaging: I have reviewed all pertinent imaging results/findings within the past 24 " hours.     XR CHEST AP PORTABLE     CLINICAL HISTORY:  Weakness     TECHNIQUE:  Single frontal view of the chest was performed.     COMPARISON:  08/31/2023     FINDINGS:  The cardiomediastinal silhouette is prominent, magnified technique noting calcification of the aorta.  There is elevation of the right hemidiaphragm..  There is no pleural effusion.  The trachea is midline.  The lungs are symmetrically expanded bilaterally with coarse interstitial attenuation bilaterally..  No large focal consolidation seen.  There is no pneumothorax.  The osseous structures are remarkable for degenerative change..     Impression:     1. Interstitial findings suggest edema noting shallow inspiratory effort.  No large focal consolidation.        Electronically signed by: Jarrod Mitchell MD  Date:                                            10/27/2023  Time:                                           19:06

## 2023-10-28 NOTE — H&P
[facility] ([address] Medicine  History & Physical    Patient Name: Christiano Dahl  MRN: [id number]  Patient Class: IP- Inpatient  Admission Date: 10/27/2023  Attending Physician: Nima Fuentes MD [facility]  Admitting Physician: Gonzalo Newman MD  Primary Care Provider: Jaci Newton MD         Patient information was obtained from patient, spouse/SO, past medical records and ER records.     Subjective:     Principal Problem:Hypocalcemia    Chief Complaint:   Chief Complaint   Patient presents with    Weakness     Progressive weakness over the past few days, +orthostatic with standing BP 88/56, IVF given in route 500 ns        HPI: No notes on file    Past Medical History:   Diagnosis Date    C. difficile colitis 2013    hospitalized for 9 days    Diabetes mellitus     Diverticulitis 2014    required hospitalization    Hypertension     Pneumonia 2014       Past Surgical History:   Procedure Laterality Date    ABCESS DRAINAGE  03/2016    perineum       Review of patient's allergies indicates:  No Known Allergies    No current facility-administered medications on file prior to encounter.     Current Outpatient Medications on File Prior to Encounter   Medication Sig    acetaminophen (TYLENOL) 500 MG tablet Take 2 tablets (1,000 mg total) by mouth every 8 (eight) hours as needed for Pain.    amoxicillin-clavulanate 875-125mg (AUGMENTIN) 875-125 mg per tablet Take 1 tablet by mouth 2 (two) times daily.    atorvastatin (LIPITOR) 20 MG tablet Take 20 mg by mouth once daily.    baclofen (LIORESAL) 10 MG tablet Take 10 mg by mouth 2 (two) times daily.    chlorthalidone (HYGROTEN) 50 MG Tab TAKE 1 TABLET(50 MG) BY MOUTH EVERY DAY    CONTOUR TEST STRIPS Strp Use to check blood sugar once daily    gabapentin (NEURONTIN) 800 MG tablet Take 800 mg by mouth 3 (three) times daily.    HYDROcodone-acetaminophen (NORCO) 7.5-325 mg per tablet Take 1 tablet by mouth 3 (three)  times daily as needed for Pain.    LIDOcaine (LIDODERM) 5 % Place 1 patch onto the skin once daily. Remove & Discard patch within 12 hours or as directed by MD    losartan (COZAAR) 100 MG tablet TAKE 1 TABLET(100 MG) BY MOUTH EVERY DAY FOR BLOOD PRESSURE    metFORMIN (GLUCOPHAGE-XR) 500 MG ER 24hr tablet Take 1,000 mg by mouth every morning.    oxyCODONE (ROXICODONE) 10 mg Tab immediate release tablet Take 1 tablet (10 mg total) by mouth every 8 (eight) hours as needed for Pain.    sildenafiL (VIAGRA) 100 MG tablet TAKE ONE TABLET BY MOUTH 30 MINUTES PRIOR TO INTERCOURSE     Family History       Problem Relation (Age of Onset)    Cancer Paternal Grandmother    Diabetes Father, Paternal Grandfather, Other    Heart disease Mother          Tobacco Use    Smoking status: Every Day     Current packs/day: 1.00     Average packs/day: 1 pack/day for 30.0 years (30.0 ttl pk-yrs)     Types: Cigarettes    Smokeless tobacco: Never    Tobacco comments:     30 years, has quit a few times in the past   Substance and Sexual Activity    Alcohol use: Yes     Comment: soc    Drug use: Not on file     Comment: MJ daily    Sexual activity: Yes     Partners: Female     Comment: in a relationship     Review of Systems   Constitutional:  Negative for activity change, chills, fatigue and fever.   HENT:  Negative for sore throat and trouble swallowing.    Respiratory: Negative.     Cardiovascular: Negative.    Gastrointestinal:  Negative for abdominal pain, diarrhea, nausea and vomiting.   Genitourinary:  Negative for difficulty urinating and frequency.   Neurological:  Negative for weakness.     Objective:     Vital Signs (Most Recent):  Temp: 99 °F (37.2 °C) (10/27/23 1852)  Pulse: 70 (10/27/23 1852)  Resp: 18 (10/27/23 1852)  BP: 136/85 (10/27/23 1852)  SpO2: (!) 94 % (10/27/23 1852) Vital Signs (24h Range):  Temp:  [99 °F (37.2 °C)-99.2 °F (37.3 °C)] 99 °F (37.2 °C)  Pulse:  [70-76] 70  Resp:  [18] 18  SpO2:  [94 %-98 %] 94  %  BP: (124-136)/(72-85) 136/85        There is no height or weight on file to calculate BMI.     Physical Exam  Vitals and nursing note reviewed.   Constitutional:       Appearance: He is obese. He is ill-appearing.   HENT:      Head: Normocephalic and atraumatic.      Mouth/Throat:      Mouth: Mucous membranes are moist.      Pharynx: No oropharyngeal exudate.   Eyes:      General: No scleral icterus.     Pupils: Pupils are equal, round, and reactive to light.   Cardiovascular:      Rate and Rhythm: Normal rate and regular rhythm.      Pulses: Normal pulses.   Pulmonary:      Effort: Pulmonary effort is normal. No respiratory distress.      Breath sounds: No wheezing or rales.   Abdominal:      General: Abdomen is protuberant. Bowel sounds are normal. There is distension.      Palpations: Abdomen is soft.      Tenderness: There is no abdominal tenderness.   Musculoskeletal:      Cervical back: Neck supple.      Right lower leg: No edema.      Left lower leg: No edema.   Skin:     General: Skin is warm and dry.      Comments: Bilateral axilla appear with macerated appearance, hypopigmentation, moist appearance, malodor, follicular changes of hidradenitis suppuritiva   Neurological:      Mental Status: He is alert and oriented to person, place, and time.      Comments: No asterixis   Psychiatric:         Mood and Affect: Mood normal.         Behavior: Behavior normal.              CRANIAL NERVES     CN III, IV, VI   Pupils are equal, round, and reactive to light.           Significant Labs: All pertinent labs within the past 24 hours have been reviewed.  ABGs:   Recent Labs   Lab 10/27/23  1723   PH 7.354   PCO2 41.0   HCO3 22.8*   POCSATURATED 55   BE -3*   PO2 30*     CBC:   Recent Labs   Lab 10/27/23  1745   WBC 17.12*   HGB 10.0*   HCT 34.3*        CMP:   Recent Labs   Lab 10/27/23  1745      K <2.0*   *   CO2 16*   GLU 66*   BUN 10   CREATININE 0.4*   CALCIUM 5.0*   PROT 4.4*   ALBUMIN 1.4*  "  BILITOT 0.2   ALKPHOS 72   AST 7*   ALT 8*   ANIONGAP 7*     Cardiac Markers: No results for input(s): "CKMB", "MYOGLOBIN", "BNP", "TROPISTAT" in the last 48 hours.  Coagulation: No results for input(s): "PT", "INR", "APTT" in the last 48 hours.  Lactic Acid: No results for input(s): "LACTATE" in the last 48 hours.  Magnesium:   Recent Labs   Lab 10/27/23  1745   MG 1.0*     Troponin:   Recent Labs   Lab 10/27/23  1745   TROPONINI <0.006     Urine Studies:   Recent Labs   Lab 10/27/23  1900   COLORU Yellow   APPEARANCEUA Clear   PHUR 6.0   SPECGRAV 1.030   PROTEINUA Trace*   GLUCUA Negative   KETONESU Negative   BILIRUBINUA Negative   OCCULTUA Negative   NITRITE Negative   LEUKOCYTESUR Negative       Significant Imaging: I have reviewed all pertinent imaging results/findings within the past 24 hours.    XR CHEST AP PORTABLE     CLINICAL HISTORY:  Weakness     TECHNIQUE:  Single frontal view of the chest was performed.     COMPARISON:  08/31/2023     FINDINGS:  The cardiomediastinal silhouette is prominent, magnified technique noting calcification of the aorta.  There is elevation of the right hemidiaphragm..  There is no pleural effusion.  The trachea is midline.  The lungs are symmetrically expanded bilaterally with coarse interstitial attenuation bilaterally..  No large focal consolidation seen.  There is no pneumothorax.  The osseous structures are remarkable for degenerative change..     Impression:     1. Interstitial findings suggest edema noting shallow inspiratory effort.  No large focal consolidation.        Electronically signed by: Jarrod Mitchell MD  Date:                                            10/27/2023  Time:                                           19:06        Assessment/Plan:     * Hypocalcemia  -etiology is unclear.  Check PTH level, Vitamin D 25-OH level  -suspect etiology may be nutritional deficiency given his concurrent hypokalemia & hypomagnesemia.  A phosphorus level is pending this " "morning   -total 2gram of IV Calcium gluconate ordered tonight   -EKG reviewed today and QTc no prolonged.  No cardiac arrhythmia or muscle spasms notes.       Will replace calcium and monitor electrolytes closely. The latest calcium labs have been reviewed and are listed below.  Recent Labs   Lab 10/27/23  1745   CALCIUM 5.0*       No results for input(s): "CAION" in the last 24 hours.      Hypokalemia  Patient has hypokalemia which is Acute and currently uncontrolled. Most recent potassium levels reviewed-   Lab Results   Component Value Date    K <2.0 (LL) 10/27/2023   . Will continue potassium replacement per protocol and recheck repeat levels after replacement completed.     Patient is taking Chlorthalidone 50mg, hold at this time given hypokalemia/hypomagnesemia  -treat concurrent hypomagnesemia   -maintain on telemetry monitoring.   -Check Urine electrolytes for Non-gap metabolic acidosis also if a Transtubular potassium gradient calculation needed as part of workup.         Hypoglycemia  Suspect due to reduced diet intake, improved with oral feedings in the ED.   -=potentially contributed to his report or trying to eat healther and restricting foods too drastically.      Transient neurological symptoms        Also his reported increased somnolence and lethargy per his wife, may have been exacerbated by bouts of hypoglycemia and potential polypharmacy.         VTE Risk Mitigation (From admission, onward)         Ordered     enoxaparin injection 40 mg  Daily         10/27/23 2216     IP VTE HIGH RISK PATIENT  Once         10/27/23 2216     Place sequential compression device  Until discontinued         10/27/23 2216                   Gonzalo Newman MD  Department of Hospital Medicine  [facility] - Intensive Care ([place] [id number])    "

## 2023-10-28 NOTE — ASSESSMENT & PLAN NOTE
-patient on outpatient remicade infusions  -patient with areas in bilateral axilla of disease, but suspect he has superimposed intertrigo at this time.   -consult dermatology to assist in determining best treatment course/ evaluation for these issues.

## 2023-10-28 NOTE — SUBJECTIVE & OBJECTIVE
Past Medical History:   Diagnosis Date    C. difficile colitis 2013    hospitalized for 9 days    Diabetes mellitus     Diverticulitis 2014    required hospitalization    Hypertension     Pneumonia 2014       Past Surgical History:   Procedure Laterality Date    [facility] DRAINAGE  03/2016    perineum       Review of patient's allergies indicates:  No Known Allergies    No current facility-administered medications on file prior to encounter.     Current Outpatient Medications on File Prior to Encounter   Medication Sig    acetaminophen (TYLENOL) 500 MG tablet Take 2 tablets (1,000 mg total) by mouth every 8 (eight) hours as needed for Pain.    amoxicillin-clavulanate 875-125mg (AUGMENTIN) 875-125 mg per tablet Take 1 tablet by mouth 2 (two) times daily.    atorvastatin (LIPITOR) 20 MG tablet Take 20 mg by mouth once daily.    baclofen (LIORESAL) 10 MG tablet Take 10 mg by mouth 2 (two) times daily.    chlorthalidone (HYGROTEN) 50 MG Tab TAKE 1 TABLET(50 MG) BY MOUTH EVERY DAY    CONTOUR TEST STRIPS Strp Use to check blood sugar once daily    gabapentin (NEURONTIN) 800 MG tablet Take 800 mg by mouth 3 (three) times daily.    HYDROcodone-acetaminophen (NORCO) 7.5-325 mg per tablet Take 1 tablet by mouth 3 (three) times daily as needed for Pain.    LIDOcaine (LIDODERM) 5 % Place 1 patch onto the skin once daily. Remove & Discard patch within 12 hours or as directed by MD    losartan (COZAAR) 100 MG tablet TAKE 1 TABLET(100 MG) BY MOUTH EVERY DAY FOR BLOOD PRESSURE    metFORMIN (GLUCOPHAGE-XR) 500 MG ER 24hr tablet Take 1,000 mg by mouth every morning.    oxyCODONE (ROXICODONE) 10 mg Tab immediate release tablet Take 1 tablet (10 mg total) by mouth every 8 (eight) hours as needed for Pain.    sildenafiL (VIAGRA) 100 MG tablet TAKE ONE TABLET BY MOUTH 30 MINUTES PRIOR TO INTERCOURSE     Family History       Problem Relation (Age of Onset)    Cancer Paternal Grandmother    Diabetes Father, Paternal Grandfather, Other     Heart disease Mother          Tobacco Use    Smoking status: Every Day     Current packs/day: 1.00     Average packs/day: 1 pack/day for 30.0 years (30.0 ttl pk-yrs)     Types: Cigarettes    Smokeless tobacco: Never    Tobacco comments:     30 years, has quit a few times in the past   Substance and Sexual Activity    Alcohol use: Yes     Comment: soc    Drug use: Not on file     Comment: MJ daily    Sexual activity: Yes     Partners: Female     Comment: in a relationship     Review of Systems   Constitutional:  Negative for activity change, chills, fatigue and fever.   HENT:  Negative for sore throat and trouble swallowing.    Respiratory: Negative.     Cardiovascular: Negative.    Gastrointestinal:  Negative for abdominal pain, diarrhea, nausea and vomiting.   Genitourinary:  Negative for difficulty urinating and frequency.   Neurological:  Negative for weakness.     Objective:     Vital Signs (Most Recent):  Temp: 99 °F (37.2 °C) (10/27/23 1852)  Pulse: 70 (10/27/23 1852)  Resp: 18 (10/27/23 1852)  BP: 136/85 (10/27/23 1852)  SpO2: (!) 94 % (10/27/23 1852) Vital Signs (24h Range):  Temp:  [99 °F (37.2 °C)-99.2 °F (37.3 °C)] 99 °F (37.2 °C)  Pulse:  [70-76] 70  Resp:  [18] 18  SpO2:  [94 %-98 %] 94 %  BP: (124-136)/(72-85) 136/85        There is no height or weight on file to calculate BMI.     Physical Exam  Vitals and nursing note reviewed.   Constitutional:       Appearance: He is obese. He is ill-appearing.   HENT:      Head: Normocephalic and atraumatic.      Mouth/Throat:      Mouth: Mucous membranes are moist.      Pharynx: No oropharyngeal exudate.   Eyes:      General: No scleral icterus.     Pupils: Pupils are equal, round, and reactive to light.   Cardiovascular:      Rate and Rhythm: Normal rate and regular rhythm.      Pulses: Normal pulses.   Pulmonary:      Effort: Pulmonary effort is normal. No respiratory distress.      Breath sounds: No wheezing or rales.   Abdominal:      General: Abdomen is  "protuberant. Bowel sounds are normal. There is distension.      Palpations: Abdomen is soft.      Tenderness: There is no abdominal tenderness.   Musculoskeletal:      Cervical back: Neck supple.      Right lower leg: No edema.      Left lower leg: No edema.   Skin:     General: Skin is warm and dry.      Comments: Bilateral axilla appear with macerated appearance, hypopigmentation, moist appearance, malodor, follicular changes of hidradenitis suppuritiva   Neurological:      Mental Status: He is alert and oriented to person, place, and time.      Comments: No asterixis   Psychiatric:         Mood and Affect: Mood normal.         Behavior: Behavior normal.              CRANIAL NERVES     CN III, IV, VI   Pupils are equal, round, and reactive to light.           Significant Labs: All pertinent labs within the past 24 hours have been reviewed.  ABGs:   Recent Labs   Lab 10/27/23  1723   PH 7.354   PCO2 41.0   HCO3 22.8*   POCSATURATED 55   BE -3*   PO2 30*     CBC:   Recent Labs   Lab 10/27/23  1745   WBC 17.12*   HGB 10.0*   HCT 34.3*        CMP:   Recent Labs   Lab 10/27/23  1745      K <2.0*   *   CO2 16*   GLU 66*   BUN 10   CREATININE 0.4*   CALCIUM 5.0*   PROT 4.4*   ALBUMIN 1.4*   BILITOT 0.2   ALKPHOS 72   AST 7*   ALT 8*   ANIONGAP 7*     Cardiac Markers: No results for input(s): "CKMB", "MYOGLOBIN", "BNP", "TROPISTAT" in the last 48 hours.  Coagulation: No results for input(s): "PT", "INR", "APTT" in the last 48 hours.  Lactic Acid: No results for input(s): "LACTATE" in the last 48 hours.  Magnesium:   Recent Labs   Lab 10/27/23  1745   MG 1.0*     Troponin:   Recent Labs   Lab 10/27/23  1745   TROPONINI <0.006     Urine Studies:   Recent Labs   Lab 10/27/23  1900   COLORU Yellow   APPEARANCEUA Clear   PHUR 6.0   SPECGRAV 1.030   PROTEINUA Trace*   GLUCUA Negative   KETONESU Negative   BILIRUBINUA Negative   OCCULTUA Negative   NITRITE Negative   LEUKOCYTESUR Negative       Significant " Imaging: I have reviewed all pertinent imaging results/findings within the past 24 hours.    XR CHEST AP PORTABLE     CLINICAL HISTORY:  Weakness     TECHNIQUE:  Single frontal view of the chest was performed.     COMPARISON:  08/31/2023     FINDINGS:  The cardiomediastinal silhouette is prominent, magnified technique noting calcification of the aorta.  There is elevation of the right hemidiaphragm..  There is no pleural effusion.  The trachea is midline.  The lungs are symmetrically expanded bilaterally with coarse interstitial attenuation bilaterally..  No large focal consolidation seen.  There is no pneumothorax.  The osseous structures are remarkable for degenerative change..     Impression:     1. Interstitial findings suggest edema noting shallow inspiratory effort.  No large focal consolidation.        Electronically signed by: Jarrod Mitchell MD  Date:                                            10/27/2023  Time:                                           19:06

## 2023-10-28 NOTE — ASSESSMENT & PLAN NOTE
Also his reported increased somnolence and lethargy per his wife, may have been exacerbated by bouts of hypoglycemia and potential polypharmacy. He has recent titration of gabapentin dosage     -his VBG did not show any hypercapnia    -He also has on review of labs a significant drop in albumin levels within the last year and occurring as recently as last 2 months.     MRI brain pending to rule out stroke f/u results

## 2023-10-28 NOTE — ASSESSMENT & PLAN NOTE
-patient on outpatient remicade infusions  -patient with areas in bilateral axilla of disease, but suspect he has superimposed intertrigo at this time.   -consult dermatology to assist in determining best treatment course/ evaluation for these issues.     10/28/23  No acute changes, he sees Dermatology [facility], discussed with Dermatology, no need for them to see inpatient at this time.

## 2023-10-28 NOTE — ASSESSMENT & PLAN NOTE
Also his reported increased somnolence and lethargy per his wife, may have been exacerbated by bouts of hypoglycemia and potential polypharmacy. He has recent titration of gabapentin dosage     -his VBG did not show any hypercapnia    -He also has on review of labs a significant drop in albumin levels within the last year and occurring as recently as last 2 months.     MRI brain pending to rule out stroke f/u results      10/28/23  MRI brain unremarkable for stroke.  We will have PT OT evaluation.  Symptoms were likely secondary to hypoglycemia, follow up with nutrition

## 2023-10-28 NOTE — PLAN OF CARE
Problem: Adult Inpatient Plan of Care  Goal: Plan of Care Review  Outcome: Ongoing, Progressing  Goal: Optimal Comfort and Wellbeing  Outcome: Ongoing, Progressing     Problem: Infection  Goal: Absence of Infection Signs and Symptoms  Outcome: Ongoing, Progressing     Problem: Diabetes Comorbidity  Goal: Blood Glucose Level Within Targeted Range  Outcome: Ongoing, Progressing     Problem: Impaired Wound Healing  Goal: Optimal Wound Healing  Outcome: Ongoing, Progressing     Problem: Pain Acute  Goal: Acceptable Pain Control and Functional Ability  Outcome: Ongoing, Progressing     Problem: Electrolyte Imbalance  Goal: Electrolyte Balance  Outcome: Ongoing, Progressing

## 2023-10-28 NOTE — ASSESSMENT & PLAN NOTE
Suspect due to reduced diet intake, improved with oral feedings in the ED.   -=potentially contributed to his report or trying to eat healther and restricting foods too drastically.      Resolved

## 2023-10-28 NOTE — PLAN OF CARE
"Endocrinology Plan of Care  10/28/2023 at 2:53 PM    63yo M with HTN, T2DM, obesity, chronic pain, and hidradenitis suppurativa on Remicade [place] [facility] [place] with AMS.     History per primary team H&P:  EMS was contacted, per ED notes EMS found patient with orthostasis blood pressure was 88/56 when standing and he received 500 cc IV fluids EN route, on arrival patient was noted to be drowsy and had constricted pupils on ED exam.     Recent history per patient and significant other is that over the last 2 weeks he has had excessive somnolence, sleeping often throughout the day.  Patient is not employed, stays at home most of the time he states that he will intermittently stay up till approximately 4:00 a.m. sleep.  He does note that he will feel sleepy during the daytime if he was out of the house or had specific responsibilities he denies having feelings of being excessively tired or falling asleep.  He does not feel refreshed after sleep at night when he is sleeping at night he has approximate 3-4 bouts of nocturia say notes she believes it is closer to 5-6.  Patient with possible history of sleep apnea but does not use any CPAP, he states that he needs to have a sleep study as an outpatient.    Initial labs in the ED with multiple significant electrolyte derangements including severe hypokalemia (<2.0), hypomagnesemia (1.0), hypocalcemia (corrected Ca 7.1). He was given IV calcium gluconate as well as K and Mg replacement, with resolution of all electrolyte abnormalities on repeat labs 10/28 AM. HypoCa workup not obtained until 10/28 once Ca normal, but revealed PTH 52 and Vitamin D 11. TFTs were also obtained upon arrival to the ED with TSH 0.027 and fT4 0.93.     Initially endocrinology was consulted 10/28 afternoon for "hypocalcemia and abnormal thyroid labs". However, on my review of the chart shortly after initial consult was placed, consult order was updated to just thyroid, as Ca normal " early this morning.    Recommendations:  - Hypocalcemia likely 2/2 hypomagnesemia and hypokalemia acutely -- as corrected Ca rapidly normalized with correction of other electrolyte abnormalities. Corrected Ca 10.2 on 10/28 with normal K and Mg  - Vit D low - recommend cholecalciferol 5000 IU qd with repeat lab outpatient in 3mo  - In terms of TFT abnormality, low TSH and low-normal fT4 -- this is the classic pattern for nonthyroidal illness and does not warrant further inpatient evaluation. Recommend repeating TSH outpatient in 6-8wks with PCP.      As there is no additional endocrine workup needed at this time, I will clarify with primary team if they would still like a formal consult. If so, pt will be seen tomorrow.      Edie Malik MD  [facility], 6th Floor  [address] [place], [place], [address]    Office: [phone]  Fax: [phone]

## 2023-10-28 NOTE — ASSESSMENT & PLAN NOTE
"-etiology is unclear.  Check PTH level, Vitamin D 25-OH level  -suspect etiology may be nutritional deficiency given his concurrent hypokalemia & hypomagnesemia.  A phosphorus level is pending this morning   -total 2gram of IV Calcium gluconate ordered tonight   -EKG reviewed today and QTc no prolonged.  No cardiac arrhythmia or muscle spasms notes.       Will replace calcium and monitor electrolytes closely. The latest calcium labs have been reviewed and are listed below.  Recent Labs   Lab 10/27/23  1745   CALCIUM 5.0*       No results for input(s): "CAION" in the last 24 hours.    "

## 2023-10-28 NOTE — ASSESSMENT & PLAN NOTE
Patient has hypokalemia which is Acute and currently uncontrolled. Most recent potassium levels reviewed-   Lab Results   Component Value Date    K 3.9 10/28/2023   . Will continue potassium replacement per protocol and recheck repeat levels after replacement completed.     Patient is taking Chlorthalidone 50mg, hold at this time given hypokalemia/hypomagnesemia  -treat concurrent hypomagnesemia   -maintain on telemetry monitoring.   -Check Urine electrolytes for Non-gap metabolic acidosis also if a Transtubular potassium gradient calculation needed as part of workup.       10/28/23    Resolved.  Continue oral intake, follow up with nutrition.  Hold chlorthalidone at this time

## 2023-10-28 NOTE — ASSESSMENT & PLAN NOTE
"-etiology is unclear.  Check PTH level, Vitamin D 25-OH level  -suspect etiology may be nutritional deficiency given his concurrent hypokalemia & hypomagnesemia.  A phosphorus level is pending this morning   -total 2gram of IV Calcium gluconate ordered tonight   -EKG reviewed today and QTc no prolonged.  No cardiac arrhythmia or muscle spasms notes.       Will replace calcium and monitor electrolytes closely. The latest calcium labs have been reviewed and are listed below.  Recent Labs   Lab 10/28/23  0742   CALCIUM 8.7       No results for input(s): "CAION" in the last 24 hours.       Resolved.  Likely secondary to poor oral intake  Low vitamin-D noted, oral replacement ordered.    "

## 2023-10-28 NOTE — ASSESSMENT & PLAN NOTE
Patient has critical hypokalemia, severe hypocalcemia and hypomagnesemia,  Low albumin  -No proteinuria noted on UA  -concern that poor oral intake is the cause as he is not complaining of any chronic nausea/vomiting or diarrhea symptoms that might contribute to severe malnutrition.   -check other micronutrient serum levels   -check thyroid function studies.     Consult nutrition as inpatient - he may require a calorie count and also continue f/u with nutrition as outpatient.

## 2023-10-28 NOTE — PROGRESS NOTES
[facility] - Intensive Care ([address] Medicine  Progress Note    Patient Name: Christiano Dahl  MRN: [id number]  Patient Class: IP- Inpatient   Admission Date: 10/27/2023  Length of Stay: 1 days  Attending Physician: Oneil Myers MD  Primary Care Provider: Jaci Newton MD        Subjective:     Principal Problem:Hypocalcemia        HPI:  62-year-old  man with a type 2 diabetes, hypertension, obesity, chronic pain, hidradenitis suppurativa on Remicade infusion presents to the emergency department for concerns of mental status change.    He is accompanied by his fiancee Keyur Alcazar.   Initial events today, bradye contacted patient and via phone call she was concerned that he was disoriented and had slurred speech.  EMS was contacted, per ED notes EMS found patient with orthostasis blood pressure was 88/56 when standing and he received 500 cc IV fluids EN route, on arrival patient was noted to be drowsy and had constricted pupils on ED exam.    Recent history per patient and significant other is that over the last 2 weeks he has had excessive somnolence, sleeping often throughout the day.  Patient is not employed, stays at home most of the time he states that he will intermittently stay up till approximately 4:00 a.m. sleep.  He does note that he will feel sleepy during the daytime if he was out of the house or had specific responsibilities he denies having feelings of being excessively tired or falling asleep.  He does not feel refreshed after sleep at night when he is sleeping at night he has approximate 3-4 bouts of nocturia say notes she believes it is closer to 5-6.  Patient with possible history of sleep apnea but does not use any CPAP, he states that he needs to have a sleep study as an outpatient.    On lab studies in the emergency department he is found with severe hypokalemia unmeasurable than 2.0 and severe hypocalcemia serum level of 5.0 that corrects based on albumin  level 7.08.  Patient denies having any muscle spasms.    For his hidradenitis suppurativa which primarily affects his bilateral sella also occasionally inguinal areas receives Remicade infusion in the [facility] system every 4 weeks, has been on this medication for 4 years dosages that have been titrated.  He states he feels that symptoms are controlled for 2 weeks after in infusion is given before he starts having increasing pain lateral axilla.  He takes Norco 7.5 mg p.r.n. as well as gabapentin 800 mg t.i.d., gabapentin dose titrated recently from 600 mg dosage.    He reports performing all ADLs at home, states in recent months he has been trying to eat healthier usually eats 3 meals a day, is trying to eat healthier by reducing the amount he eats trying to eat salads.    Pts essie had COVID infection about 3 weeks ago, she was tested at urgent care, patient had similar symptoms of cough and sore throat he believes he also had covid.  Took no other medications and reports symptoms have improved.     ED treatment Haldol 10 mg IV, LR 1 L, normal saline 1 L, methocarbamol 1 g, 40 mEq oral potassium bicarbonate, potassium chloride IV 10 mEq, 1 g calcium gluconate.      Overview/Hospital Course:  No notes on file    Interval History:  Patient feels better, still feels a little weak.  Electrolytes corrected.  He sees [facility].  Discussed with dermatologist, he has no acute changes at this time they do need to see him inpatient.     Review of Systems   All other systems reviewed and are negative.     Objective:      Vital Signs (Most Recent):  Temp: 98.7 °F (37.1 °C) (10/28/23 1138)  Pulse: (!) 58 (10/28/23 1138)  Resp: 18 (10/28/23 1138)  BP: 135/63 (10/28/23 1138)  SpO2: (!) 92 % (10/28/23 1138) Vital Signs (24h Range):  Temp:  [97.8 °F (36.6 °C)-100.1 °F (37.8 °C)] 98.7 °F (37.1 °C)  Pulse:  [55-87] 58  Resp:  [17-20] 18  SpO2:  [90 %-98 %] 92 %  BP: (124-169)/(61-85) 135/63      Weight: 103.8 kg (228 lb 14.4  "oz)  Body mass index is 35.85 kg/m².     Intake/Output Summary (Last 24 hours) at 10/28/2023 1411  Last data filed at 10/28/2023 0800      Gross per 24 hour   Intake 480 ml   Output --   Net 480 ml      Physical Exam  Constitutional:       General: He is not in acute distress.     Appearance: He is obese.   HENT:      Head: Normocephalic.      Right Ear: External ear normal.      Left Ear: External ear normal.      Nose: Nose normal.   Cardiovascular:      Rate and Rhythm: Normal rate.   Pulmonary:      Effort: Pulmonary effort is normal.   Abdominal:      Palpations: Abdomen is soft.      Tenderness: There is no abdominal tenderness.   Musculoskeletal:      Comments: Appears to be slightly lethargic   Skin:     Comments: Chronic changes, no acute changes to armpits   Neurological:      Mental Status: He is alert and oriented to person, place, and time.   Psychiatric:         Mood and Affect: Mood normal.            Computed MELD 3.0 unavailable. Necessary lab results were not found in the last year.  Computed MELD-Na unavailable. Necessary lab results were not found in the last year.        Significant Labs:  CBC:      Recent Labs   Lab 10/27/23  1745   WBC 17.12*   HGB 10.0*   HCT 34.3*         CMP:       Recent Labs   Lab 10/27/23  1745 10/28/23  0742    141   K <2.0* 3.9   * 101   CO2 16* 26   GLU 66* 154*   BUN 10 17   CREATININE 0.4* 0.8   CALCIUM 5.0* 8.7   PROT 4.4*  --    ALBUMIN 1.4* 2.1*   BILITOT 0.2  --    ALKPHOS 72  --    AST 7*  --    ALT 8*  --    ANIONGAP 7* 14      PTINR:  No results for input(s): "INR" in the last 48 hours.     Significant Procedures:   Dobutamine Stress Test with Color Flow: No results found for this or any previous visit.       Assessment/Plan:      * Hypocalcemia  -etiology is unclear.  Check PTH level, Vitamin D 25-OH level  -suspect etiology may be nutritional deficiency given his concurrent hypokalemia & hypomagnesemia.  A phosphorus level is pending this " "morning   -total 2gram of IV Calcium gluconate ordered tonight   -EKG reviewed today and QTc no prolonged.  No cardiac arrhythmia or muscle spasms notes.       Will replace calcium and monitor electrolytes closely. The latest calcium labs have been reviewed and are listed below.  Recent Labs   Lab 10/28/23  0742   CALCIUM 8.7       No results for input(s): "CAION" in the last 24 hours.       Resolved.  Likely secondary to poor oral intake  Low vitamin-D noted, oral replacement ordered.      Leucocytosis  Chronic.  We will give outpatient hematology referral      Abnormal thyroid blood test  Low TSH and T3 noted.  T4 normal.  Likely sick euthyroid.  Discussed with Endocrine, they recommended follow up as an outpatient with repeat thyroid function test.    Severe malnutrition  Patient has critical hypokalemia, severe hypocalcemia and hypomagnesemia,  Low albumin  -No proteinuria noted on UA  -concern that poor oral intake is the cause as he is not complaining of any chronic nausea/vomiting or diarrhea symptoms that might contribute to severe malnutrition.   -check other micronutrient serum levels   -check thyroid function studies.     Consult nutrition as inpatient - he may require a calorie count and also continue f/u with nutrition as outpatient.     Intertrigo  -patient with areas in bilateral axilla of disease, but suspect he has superimposed intertrigo at this time.   -consult dermatology to assist in determining best treatment course/ evaluation for these issues.     -Discuss if topical powders alone may suffice however if not, he may need moisture absorbing products like Inter-Dry that our wound care service may be able to provide for patient to prevent further maceration, although this type of dressing may be difficulty to maintain under the arms when pt is moving arms regularly.     Transient neurological symptoms  Also his reported increased somnolence and lethargy per his wife, may have been exacerbated by " bouts of hypoglycemia and potential polypharmacy. He has recent titration of gabapentin dosage     -his VBG did not show any hypercapnia    -He also has on review of labs a significant drop in albumin levels within the last year and occurring as recently as last 2 months.     MRI brain pending to rule out stroke f/u results      10/28/23  MRI brain unremarkable for stroke.  We will have PT OT evaluation.  Symptoms were likely secondary to hypoglycemia, follow up with nutrition    Hypoglycemia  Suspect due to reduced diet intake, improved with oral feedings in the ED.   -=potentially contributed to his report or trying to eat healther and restricting foods too drastically.      Resolved    Hypokalemia  Patient has hypokalemia which is Acute and currently uncontrolled. Most recent potassium levels reviewed-   Lab Results   Component Value Date    K 3.9 10/28/2023   . Will continue potassium replacement per protocol and recheck repeat levels after replacement completed.     Patient is taking Chlorthalidone 50mg, hold at this time given hypokalemia/hypomagnesemia  -treat concurrent hypomagnesemia   -maintain on telemetry monitoring.   -Check Urine electrolytes for Non-gap metabolic acidosis also if a Transtubular potassium gradient calculation needed as part of workup.       10/28/23    Resolved.  Continue oral intake, follow up with nutrition.  Hold chlorthalidone at this time     Controlled type 2 diabetes mellitus without complication, without long-term current use of insulin  Patient's FSGs are uncontrolled due to hypoglycemia on current medication regimen.  Last A1c reviewed-   Lab Results   Component Value Date    HGBA1C 8.1 (H) 10/05/2023     Most recent fingerstick glucose reviewed-   Recent Labs   Lab 10/27/23  2355   POCTGLUCOSE 215*     Current correctional scale  Low  Maintain anti-hyperglycemic dose as follows-   Antihyperglycemics (From admission, onward)      Start     Stop Route Frequency Ordered     10/27/23 2116  insulin aspart U-100 pen 0-5 Units         -- SubQ Before meals & nightly PRN 10/27/23 2016          Hold Oral hypoglycemics while patient is in the hospital.    Hypoglycemic on arrival could cause patient's neurologic symptoms,  He is oriented and providing history at bedside, last glucose 95.     Hidradenitis suppurativa  -patient on outpatient remicade infusions  -patient with areas in bilateral axilla of disease, but suspect he has superimposed intertrigo at this time.   -consult dermatology to assist in determining best treatment course/ evaluation for these issues.     10/28/23  No acute changes, he sees Dermatology [facility], discussed with Dermatology, no need for them to see inpatient at this time.      Essential hypertension  Chronic, controlled. Latest blood pressure and vitals reviewed-     Temp:  [98.9 °F (37.2 °C)-100.1 °F (37.8 °C)]   Pulse:  [68-87]   Resp:  [18-20]   BP: (124-169)/(61-85)   SpO2:  [92 %-98 %] .   Home meds for hypertension were reviewed and noted below.   Hypertension Medications               chlorthalidone (HYGROTEN) 50 MG Tab TAKE 1 TABLET(50 MG) BY MOUTH EVERY DAY    losartan (COZAAR) 100 MG tablet TAKE 1 TABLET(100 MG) BY MOUTH EVERY DAY FOR BLOOD PRESSURE            While in the hospital, will manage blood pressure as follows; Adjust home antihypertensive regimen as follows- HOLD CHLORTHALIDOne  due to hypokalemia.   Continue Losartan.           VTE Risk Mitigation (From admission, onward)           Ordered     enoxaparin injection 40 mg  Daily         10/27/23 2216     IP VTE HIGH RISK PATIENT  Once         10/27/23 2216     Place sequential compression device  Until discontinued         10/27/23 2216                    Discharge Planning   YOSELYN:      Code Status: Full Code   Is the patient medically ready for discharge?: No    Reason for patient still in hospital (select all that apply): Patient trending condition                     Oneil Myers MD  Department of  [facility] Medicine   [facility] - Intensive Care ([place]-16)

## 2023-10-29 VITALS
WEIGHT: 228.88 LBS | SYSTOLIC BLOOD PRESSURE: 131 MMHG | DIASTOLIC BLOOD PRESSURE: 76 MMHG | RESPIRATION RATE: 20 BRPM | TEMPERATURE: 99 F | BODY MASS INDEX: 35.92 KG/M2 | HEART RATE: 75 BPM | OXYGEN SATURATION: 93 % | HEIGHT: 67 IN

## 2023-10-29 PROBLEM — R29.818 TRANSIENT NEUROLOGICAL SYMPTOMS: Status: RESOLVED | Noted: 2023-10-27 | Resolved: 2023-10-29

## 2023-10-29 PROBLEM — E83.51 HYPOCALCEMIA: Status: RESOLVED | Noted: 2023-10-27 | Resolved: 2023-10-29

## 2023-10-29 PROBLEM — E16.2 HYPOGLYCEMIA: Status: RESOLVED | Noted: 2023-10-27 | Resolved: 2023-10-29

## 2023-10-29 PROBLEM — E87.6 HYPOKALEMIA: Status: RESOLVED | Noted: 2023-10-27 | Resolved: 2023-10-29

## 2023-10-29 LAB
ALBUMIN SERPL BCP-MCNC: 2.2 G/DL (ref 3.5–5.2)
ANION GAP SERPL CALC-SCNC: 13 MMOL/L (ref 8–16)
BUN SERPL-MCNC: 14 MG/DL (ref 8–23)
CALCIUM SERPL-MCNC: 8.9 MG/DL (ref 8.7–10.5)
CHLORIDE SERPL-SCNC: 99 MMOL/L (ref 95–110)
CO2 SERPL-SCNC: 27 MMOL/L (ref 23–29)
CREAT SERPL-MCNC: 0.8 MG/DL (ref 0.5–1.4)
EST. GFR  (NO RACE VARIABLE): >60 ML/MIN/1.73 M^2
GLUCOSE SERPL-MCNC: 114 MG/DL (ref 70–110)
MAGNESIUM SERPL-MCNC: 1.8 MG/DL (ref 1.6–2.6)
PHOSPHATE SERPL-MCNC: 3.3 MG/DL (ref 2.7–4.5)
PHOSPHATE SERPL-MCNC: 3.3 MG/DL (ref 2.7–4.5)
POCT GLUCOSE: 139 MG/DL (ref 70–110)
POCT GLUCOSE: 148 MG/DL (ref 70–110)
POTASSIUM SERPL-SCNC: 3.7 MMOL/L (ref 3.5–5.1)
SODIUM SERPL-SCNC: 139 MMOL/L (ref 136–145)

## 2023-10-29 PROCEDURE — 80069 RENAL FUNCTION PANEL: CPT | Performed by: HOSPITALIST

## 2023-10-29 PROCEDURE — 25000003 PHARM REV CODE 250: Performed by: INTERNAL MEDICINE

## 2023-10-29 PROCEDURE — 97116 GAIT TRAINING THERAPY: CPT

## 2023-10-29 PROCEDURE — 83735 ASSAY OF MAGNESIUM: CPT | Performed by: HOSPITALIST

## 2023-10-29 PROCEDURE — 97165 OT EVAL LOW COMPLEX 30 MIN: CPT

## 2023-10-29 PROCEDURE — 36415 COLL VENOUS BLD VENIPUNCTURE: CPT | Performed by: HOSPITALIST

## 2023-10-29 PROCEDURE — 25000003 PHARM REV CODE 250: Performed by: HOSPITALIST

## 2023-10-29 PROCEDURE — 97161 PT EVAL LOW COMPLEX 20 MIN: CPT

## 2023-10-29 PROCEDURE — 97530 THERAPEUTIC ACTIVITIES: CPT

## 2023-10-29 RX ORDER — MUPIROCIN 20 MG/G
OINTMENT TOPICAL 2 TIMES DAILY
Status: DISCONTINUED | OUTPATIENT
Start: 2023-10-29 | End: 2023-10-29 | Stop reason: HOSPADM

## 2023-10-29 RX ORDER — KETOCONAZOLE 20 MG/ML
SHAMPOO, SUSPENSION TOPICAL
Status: DISCONTINUED | OUTPATIENT
Start: 2023-10-29 | End: 2023-10-29 | Stop reason: HOSPADM

## 2023-10-29 RX ORDER — CHOLECALCIFEROL (VITAMIN D3) 25 MCG
1000 TABLET ORAL DAILY
Qty: 30 TABLET | Refills: 3 | Status: SHIPPED | OUTPATIENT
Start: 2023-10-30 | End: 2024-02-27

## 2023-10-29 RX ORDER — HYDROCODONE BITARTRATE AND ACETAMINOPHEN 7.5; 325 MG/1; MG/1
1 TABLET ORAL 3 TIMES DAILY PRN
Qty: 15 TABLET | Refills: 0 | Status: SHIPPED | OUTPATIENT
Start: 2023-10-29 | End: 2023-11-03

## 2023-10-29 RX ORDER — ASCORBIC ACID 500 MG
500 TABLET ORAL DAILY
Qty: 30 TABLET | Refills: 0 | Status: SHIPPED | OUTPATIENT
Start: 2023-10-29 | End: 2023-10-29 | Stop reason: SDUPTHER

## 2023-10-29 RX ORDER — KETOCONAZOLE 20 MG/ML
SHAMPOO, SUSPENSION TOPICAL
Qty: 200 ML | Refills: 0 | Status: SHIPPED | OUTPATIENT
Start: 2023-10-29 | End: 2023-11-12

## 2023-10-29 RX ORDER — ASCORBIC ACID 500 MG
500 TABLET ORAL DAILY
Qty: 30 TABLET | Refills: 5 | Status: SHIPPED | OUTPATIENT
Start: 2023-10-29 | End: 2024-04-26

## 2023-10-29 RX ORDER — POTASSIUM CHLORIDE 750 MG/1
10 CAPSULE, EXTENDED RELEASE ORAL DAILY
Status: DISCONTINUED | OUTPATIENT
Start: 2023-10-31 | End: 2023-10-29 | Stop reason: HOSPADM

## 2023-10-29 RX ORDER — NICOTINE 7MG/24HR
1 PATCH, TRANSDERMAL 24 HOURS TRANSDERMAL DAILY
Qty: 30 PATCH | Refills: 0 | Status: ON HOLD | OUTPATIENT
Start: 2023-10-29 | End: 2023-11-21 | Stop reason: HOSPADM

## 2023-10-29 RX ADMIN — LOSARTAN POTASSIUM 25 MG: 25 TABLET, FILM COATED ORAL at 09:10

## 2023-10-29 RX ADMIN — SENNOSIDES AND DOCUSATE SODIUM 1 TABLET: 8.6; 5 TABLET ORAL at 09:10

## 2023-10-29 RX ADMIN — CHOLECALCIFEROL TAB 25 MCG (1000 UNIT) 1000 UNITS: 25 TAB at 09:10

## 2023-10-29 RX ADMIN — FLUTICASONE PROPIONATE 100 MCG: 50 SPRAY, METERED NASAL at 09:10

## 2023-10-29 RX ADMIN — ATORVASTATIN CALCIUM 20 MG: 20 TABLET, FILM COATED ORAL at 09:10

## 2023-10-29 RX ADMIN — HYDROCODONE BITARTRATE AND ACETAMINOPHEN 1 TABLET: 7.5; 325 TABLET ORAL at 09:10

## 2023-10-29 RX ADMIN — GABAPENTIN 600 MG: 300 CAPSULE ORAL at 09:10

## 2023-10-29 RX ADMIN — BACLOFEN 5 MG: 5 TABLET ORAL at 09:10

## 2023-10-29 RX ADMIN — GABAPENTIN 600 MG: 300 CAPSULE ORAL at 03:10

## 2023-10-29 NOTE — PLAN OF CARE
Problem: Adult Inpatient Plan of Care  Goal: Plan of Care Review  Outcome: Ongoing, Progressing     Problem: Adult Inpatient Plan of Care  Goal: Patient-Specific Goal (Individualized)  Outcome: Ongoing, Progressing     Problem: Diabetes Comorbidity  Goal: Blood Glucose Level Within Targeted Range  Outcome: Ongoing, Progressing     Problem: Impaired Wound Healing  Goal: Optimal Wound Healing  Outcome: Ongoing, Progressing     Problem: Pain Acute  Goal: Acceptable Pain Control and Functional Ability  Outcome: Ongoing, Progressing     Problem: Electrolyte Imbalance  Goal: Electrolyte Balance  Outcome: Ongoing, Progressing

## 2023-10-29 NOTE — PT/OT/SLP EVAL
Physical Therapy Co-Evaluation and Co-Treatment    Patient Name:  Christiano Dahl   MRN:  [id number]  Admit Date: 10/27/2023  Admitting Diagnosis:  Hypocalcemia   Length of Stay: 2 days  Recent Surgery: * No surgery found *      Recommendations:     Discharge Recommendations:   No Therapy Indicated   Discharge Equipment Recommendations: walker, rolling   Barriers to discharge: None    Appropriate transfer level with nursing staff: Ambulatory in room with [facility]     Plan:     During this hospitalization, patient to be seen 3 x/week to address the identified rehab impairments via gait training, therapeutic activities, therapeutic exercises, neuromuscular re-education and progress towards the established goals.  Plan of Care Expires:  11/28/23  Plan of Care Reviewed with: patient, significant other    Assessment:     Christiano Dahl is a 62 y.o. male admitted with a medical diagnosis of Hypocalcemia. Pt agreeable to therapy evaluation. Patient reports Ind for mobility and with ADLs PTA. Patient with good strength but decreased endurance and impaired balance with gait today. Patient will have assistance from [facility] upon d/c. Patient would benefit from gait and balance training in future sessions. Patient would benefit from skilled therapy services to maximize safety and independence, increase activity tolerance, decrease fall risk, decrease caregiver burden, improve QOL, improve patient's functional mobility, and decrease risk of contractures and pressure sores.    Problem List: weakness, impaired endurance, impaired self care skills, impaired functional mobility, gait instability, impaired balance, pain.  Rehab Prognosis: Good; patient would benefit from acute skilled PT services to address these deficits and reach maximum level of function.      Goals:   Multidisciplinary Problems       Physical Therapy Goals          Problem: Physical Therapy    Goal Priority Disciplines Outcome Goal Variances Interventions   Physical  Therapy Goal     PT, PT/OT Ongoing, Progressing     Description: Goals to be met by: 23     Patient will increase functional independence with mobility by performin. Supine to sit with [place]  2. Sit to stand transfer with [place]  3. Bed to chair transfer with [place] using LRAD  4. Gait  x 200 feet with Supervision using LRAD.   5. Ascend/descend 5 stair with bilateral Handrails Stand-by Assistance using LRAD.                          Subjective     RN notified prior to session. Significant other present upon PT entrance into room. Patient agreeable to PT evaluation.    Chief Complaint: R heel pain with mobility/WB  Patient/Family Comments/goals: s/o goal- improve balance  Pain/Comfort:  Pain Rating 1: other (see comments) (unrated)  Location - Side 1: Right  Location - Orientation 1: generalized  Location 1: heel  Pain Addressed 1: Reposition, Distraction  Pain Rating Post-Intervention 1: 8/10 (with weight bearing)    Social History:  Residence: lives with their spouse 1-story house with 5 EDWIN and B HR. Pt's bathroom has a tub shower.  Support available:  [employer]  Equipment Owned (not using): none  Equipment Used: None  Prior level of function: independent      Patient reports they will have assistance from [employer] upon discharge.    Objective:     Additional staff present: OT; OT for co-evaluation due to suspected patient need for two skilled therapists to appropriately assess patient's functional deficits as well as ensure patient safety, accommodate for limited activity tolerance, and provide appropriate, skilled assistance to maximize functional potential during evaluation.    Patient found supine with: telemetry     General Precautions: Standard, Cardiac fall   Orthopedic Precautions:N/A   Braces: N/A   Body mass index is 35.85 kg/m².  Oxygen Device: Room Air  Vitals: /76 (BP Location: Right arm, Patient Position: Sitting)   Pulse 76   Temp 99 °F (37.2 °C) (Oral)   Resp 20  "  Ht 5' 7" (1.702 m)   Wt 103.8 kg (228 lb 14.4 oz)   SpO2 (!) 93%   BMI 35.85 kg/m²       Exams:    Cognition:  Alert and Cooperative  Command following: Follows multistep verbal commands  Communication: clear/fluent    Sensation:   Light touch sensation: Intact BLEs    Gross Motor Coordination: No deficits noted during functional mobility tasks     Edema/Skin Integrity: None noted; Visible skin intact    Postural examination/scapula alignment: Rounded shoulder and Head forward    Lower Extremity Range of Motion:  Right Lower Extremity: WFL  Left Lower Extremity: WFL    Lower Extremity Strength:  Right Lower Extremity: WFL except hip flexion 4/5  Left Lower Extremity: WFL except hip flexion 4/5      Functional Mobility:    Bed Mobility:  Supine > Sit with stand by assistance    Transfers:   Sit <> Stand Transfer: Stand-by Assistance x 1 trials from EOB with no AD              Gait:  Distance: 20' in room  Assistance level: Stand-by Assistance  Assistive Device: none  Gait Assessment: decreased step length , decreased stride length , decreased luis fernando, and decreased gait speed  Comments: Pt with short shuffle steps with mild unsteadiness during gait trial but no LOB. Pt reports 10/10 R heel pain with mobility. Pt declined further mobility.    Balance:  Sitting Balance  Static: supervision  Dynamic: stand by assistance  Standing:  Static: stand by assistance  Dynamic: stand by assistance    Outcome Measures:  AM-PAC 6 CLICK MOBILITY  Turning over in bed (including adjusting bedclothes, sheets and blankets)?: 3  Sitting down on and standing up from a chair with arms (e.g., wheelchair, bedside commode, etc.): 3  Moving from lying on back to sitting on the side of the bed?: 3  Moving to and from a bed to a chair (including a wheelchair)?: 3  Need to walk in hospital room?: 3  Climbing 3-5 steps with a railing?: 3  Basic Mobility Total Score: 18       Education:  Time provided for education, counseling and discussion " of health disposition in regards to patient's current status  All questions answered within PT scope of practice and to patient's satisfaction  PT role in POC to address current functional deficits  Pt educated on proper body mechanics, safety techniques, and energy conservation with PT facilitation and cueing throughout session  Call nursing/pct to transfer to chair/use bathroom. Pt stated understanding.    Patient left sitting edge of bed with all lines intact, call button in reach, RN notified, and fiance present.      History:     Past Medical History:   Diagnosis Date    C. difficile colitis 2013    hospitalized for 9 days    Diabetes mellitus     Diverticulitis 2014    required hospitalization    Hypertension     Pneumonia 2014       Past Surgical History:   Procedure Laterality Date    ABCESS DRAINAGE  03/2016    perineum       Family History   Problem Relation Age of Onset    Diabetes Father     Cancer Paternal Grandmother         lung (smoker)    Diabetes Paternal Grandfather     Diabetes Other     Heart disease Mother        Social History     Socioeconomic History    Marital status: Significant Other   Tobacco Use    Smoking status: Every Day     Current packs/day: 1.00     Average packs/day: 1 pack/day for 30.0 years (30.0 ttl pk-yrs)     Types: Cigarettes    Smokeless tobacco: Never    Tobacco comments:     30 years, has quit a few times in the past   Substance and Sexual Activity    Alcohol use: Yes     Comment: soc    Sexual activity: Yes     Partners: Female     Comment: in a relationship   Social History Narrative    Employed - works as  at [employer] in the , has worked there ~20 years.     Previously lived in [address], moved to [place].    Previously in relationship; no longer as of 2019    Mother passed away around [id number]       Time Tracking:     PT Received On: 10/29/23  PT Start Time: 0900     PT Stop Time: 0917  PT Total Time (min): 17 min     Billable Minutes:  Evaluation 9 minutes and Gait Training 8 minutes    10/29/2023

## 2023-10-29 NOTE — PLAN OF CARE
Problem: Occupational Therapy  Goal: Occupational Therapy Goal  Description: Goals to be met by: 11/29/23 (1 Month)     Patient will increase functional independence with ADLs by performing:    UE Dressing with [place].  LE Dressing with [place].  Grooming while standing at sink with [place].  Toileting from toilet with [place] for hygiene and clothing management.   Step transfer with [place]  Toilet transfer to toilet with [place].    Evaluated pt and established OT POC. Continue OT as tolerated.  Evette Lawrence OT  10/29/2023    Outcome: Ongoing, Progressing

## 2023-10-29 NOTE — PLAN OF CARE
PT evaluation completed- see note for details. PT POC and goals established.    Problem: Physical Therapy  Goal: Physical Therapy Goal  Description: Goals to be met by: 23     Patient will increase functional independence with mobility by performin. Supine to sit with [place]  2. Sit to stand transfer with [place]  3. Bed to chair transfer with [place] using LRAD  4. Gait  x 200 feet with Supervision using LRAD.   5. Ascend/descend 5 stair with bilateral Handrails Stand-by Assistance using LRAD.     Outcome: Ongoing, Progressing

## 2023-10-29 NOTE — PT/OT/SLP EVAL
"Occupational Therapy   Co-Evaluation and Co-Treatment    Name: Christiano Dahl  MRN: [id number]  Admitting Diagnosis: Hypocalcemia  Recent Surgery: * No surgery found *      Recommendations:     Discharge Recommendations: No Therapy Indicated  Discharge Equipment Recommendations:  none  Barriers to discharge:       Assessment:     Christiano Dahl is a 62 y.o. male with a medical diagnosis of Hypocalcemia.  He presents with impaired ADL and mobility performance deficits limited 2/2 R heel pain. Pt tolerated functional mobility well in room to simulate home ambulation with pt's joie present and supportive. PTA, pt was mobilizing using no AD and needing no A for ADLs. Pt will have A from aspentrungJoe at d/c.  Performance deficits affecting function: weakness, impaired self care skills, impaired endurance, impaired functional mobility, pain.      Rehab Prognosis: Good; patient would benefit from acute skilled OT services to address these deficits and reach maximum level of function.       Plan:     Patient to be seen 2 x/week to address the above listed problems via self-care/home management, therapeutic activities, therapeutic exercises  Plan of Care Expires:    Plan of Care Reviewed with: patient, significant other    Subjective     Chief Complaint: R heel pain after mobilizing   Patient/Family Comments/goals: "he has diabetes so that could be why his foot hurts"    Occupational Profile:  Living Environment: Pt lives with his joie in a [facility] with 5 EDWIN,  BL HR. Pt has a shower/tub combo for bathing  Previous level of function: I with ADLs and mobility  Roles and Routines: Pt home dwelling  Equipment Used at Home:    Assistance upon Discharge: Joie    Pain/Comfort:  Pain Rating 1: other (see comments) (pain in R heel)  Location - Orientation 1: generalized  Location 1: heel  Pain Addressed 1: Reposition, Distraction  Pain Addressed 2: Cessation of Activity    Patients cultural, spiritual, Amish conflicts given " the current situation: no    Objective:     Communicated with: RN prior to session.  Patient found HOB elevated with telemetry upon OT entry to room.    General Precautions: Standard, fall  Orthopedic Precautions: N/A  Braces: N/A  Respiratory Status: Room air    Occupational Performance:    Bed Mobility:    Patient completed Rolling/Turning to Left with  stand by assistance  Patient completed Scooting/Bridging with stand by assistance  Patient completed Supine to Sit with stand by assistance    Functional Mobility/Transfers:  Patient completed Sit <> Stand Transfer with stand by assistance  with  no assistive device   Functional Mobility: Pt stood and mobilized lap in room 1 lap .     Activities of Daily Living:  Grooming: setup A for oral care,  face hygiene, and bathing     Cognitive/Visual Perceptual:  Cognitive/Psychosocial Skills:     -       Oriented to: Person, Place, Time, and Situation   -       Follows Commands/attention:Follows multistep  commands  -       Communication: clear/fluent  -       Memory: No Deficits noted  -       Safety awareness/insight to disability: impaired   -       Mood/Affect/Coping skills/emotional control: Pleasant    Physical Exam:  Balance:    -       demo good sitting and standing balance   Upper Extremity Range of Motion:     -       Right Upper Extremity: WFL  -       Left Upper Extremity: WFL  Upper Extremity Strength:    -       Right Upper Extremity: WFL  -       Left Upper Extremity: WFL   Strength:    -       Right Upper Extremity: WFL  -       Left Upper Extremity: WFL    AMPAC 6 Click ADL:  AMPAC Total Score: 24    Treatment & Education:  Pt educated on role of occupational therapy, POC, and safety during ADLs and functional mobility. Pt and OT discussed importance of safe, continued mobility to optimize daily living skills. Pt verbalized understanding.     White board updated during session. Pt given instruction to call for medical staff/nurse for assistance.        Patient left HOB elevated with all lines intact, call button in reach, RN notified, and fiance present    GOALS:   Multidisciplinary Problems       Occupational Therapy Goals          Problem: Occupational Therapy    Goal Priority Disciplines Outcome Interventions   Occupational Therapy Goal     OT, PT/OT Ongoing, Progressing    Description: Goals to be met by: 11/29/23 (1 Month)     Patient will increase functional independence with ADLs by performing:    UE Dressing with [place].  LE Dressing with [place].  Grooming while standing at sink with [place].  Toileting from toilet with [place] for hygiene and clothing management.   Step transfer with [place]  Toilet transfer to toilet with [place].                           History:     Past Medical History:   Diagnosis Date    C. difficile colitis 2013    hospitalized for 9 days    Diabetes mellitus     Diverticulitis 2014    required hospitalization    Hypertension     Pneumonia 2014         Past Surgical History:   Procedure Laterality Date    ABCESS DRAINAGE  03/2016    perineum       Time Tracking:     OT Date of Treatment: 10/29/23  OT Start Time: 0900  OT Stop Time: 0917  OT Total Time (min): 17 min    Billable Minutes:Evaluation 8 min  Therapeutic Activity 9 min    10/29/2023

## 2023-10-29 NOTE — PLAN OF CARE
Problem: Infection  Goal: Absence of Infection Signs and Symptoms  Outcome: Adequate for Care Transition  Intervention: Prevent or Manage Infection  Flowsheets (Taken 10/29/2023 0932)  Infection Management: aseptic technique maintained     Problem: Adult Inpatient Plan of Care  Goal: Plan of Care Review  Outcome: Adequate for Care Transition  Flowsheets (Taken 10/29/2023 1547)  Plan of Care Reviewed With:   patient   significant other  Goal: Patient-Specific Goal (Individualized)  Outcome: Adequate for Care Transition  Goal: Absence of Hospital-Acquired Illness or Injury  Outcome: Adequate for Care Transition  Goal: Optimal Comfort and Wellbeing  Outcome: Adequate for Care Transition  Goal: Readiness for Transition of Care  Outcome: Adequate for Care Transition     Problem: Diabetes Comorbidity  Goal: Blood Glucose Level Within Targeted Range  Outcome: Adequate for Care Transition  Intervention: Monitor and Manage Glycemia  Flowsheets (Taken 10/29/2023 0932)  Glycemic Management: blood glucose monitored     Problem: Impaired Wound Healing  Goal: Optimal Wound Healing  Outcome: Adequate for Care Transition  Intervention: Promote Wound Healing  Flowsheets (Taken 10/29/2023 0932)  Pain Management Interventions: pain management plan reviewed with patient/caregiver     Problem: Electrolyte Imbalance  Goal: Electrolyte Balance  Outcome: Adequate for Care Transition  Intervention: Monitor and Manage Electrolyte Imbalance  Flowsheets (Taken 10/29/2023 0932)  Fluid/Electrolyte Management: (labs monitored) other (see comments)     Problem: Fall Injury Risk  Goal: Absence of Fall and Fall-Related Injury  Outcome: Adequate for Care Transition  Intervention: Identify and Manage Contributors  Flowsheets (Taken 10/29/2023 0932)  Self-Care Promotion:   independence encouraged   BADL personal objects within reach   BADL personal routines maintained   safe use of adaptive equipment encouraged

## 2023-10-29 NOTE — PLAN OF CARE
"[facility] - Intensive Care ([facility]-16)  Initial Discharge Assessment       Primary Care Provider: Jaci Newton MD    Admission Diagnosis: Hypocalcemia [E83.51]  Hypokalemia [E87.6]  Dizziness [R42]  Generalized weakness [R53.1]  Chest pain [R07.9]    Admission Date: 10/27/2023  Expected Discharge Date: 10/29/2023    Transition of Care Barriers: (P) None    Payor: MEDICAID / Plan: [facility] CONNECT / Product Type: Managed Medicaid /     Extended Emergency Contact Information  Primary Emergency Contact: BLANCOWALDOSHIELIJAH  Mobile Phone: [phone]  Relation: Significant other  Preferred language: English   needed? No    Discharge Plan A: (P) Home with family  Discharge Plan B: (P) Home      [id number] - [place] - [address] AT [facility] [address]  [address]  [place] [address]  Phone: [phone] Fax: [phone]    Dr Fuentes [place], [place] - [address] [facility]  [address] [facility] [address]  [facility] [address]  Phone: [phone] Fax: [phone]    [id number] - [place], [place] - [address]  [address]  [place] [address]  Phone: [phone] Fax: [phone]      Initial Assessment (most recent)       Adult Discharge Assessment - 10/29/23 1454          Discharge Assessment    Assessment Type Discharge Planning Assessment     Confirmed/corrected address, phone number and insurance Yes     Confirmed Demographics Correct on Facesheet     Source of Information family     If unable to respond/provide information was family/caregiver contacted? Yes     Contact Name/Number SAQIB SIMEON (Significant other)   [phone]     When was your last doctors appointment? --   "last month"    Communicated YOSELYN with patient/caregiver Yes (P)      Reason For Admission "Low potassium, low viatamin c, low vitamin d" (P)      People in Home spouse (P)      Do you expect to return to your current living " situation? Yes (P)      Do you have help at home or someone to help you manage your care at home? Yes (P)      Who are your caregiver(s) and their phone number(s)? SAQIB SIMEON (Significant other)   [phone] (P)      Prior to hospitilization cognitive status: No Deficits (P)      Current cognitive status: Unable to Assess (P)      Home Accessibility stairs to enter home (P)      Number of Stairs, Main Entrance five (P)      Equipment Currently Used at Home none (P)      Readmission within 30 days? No (P)      Patient currently being followed by outpatient case management? No (P)      Do you currently have service(s) that help you manage your care at home? No (P)      Do you take prescription medications? Yes (P)      Do you have prescription coverage? Yes (P)      Coverage MEDICAID - [facility] (P)      Do you have any problems affording any of your prescribed medications? No (P)      Is the patient taking medications as prescribed? yes (P)      Who is going to help you get home at discharge? SAQIB SIMEON (Significant other)   [phone] (P)      How do you get to doctors appointments? car, drives self (P)      Are you on dialysis? No (P)      Do you take coumadin? No (P)      DME Needed Upon Discharge  cane, straight (P)      Discharge Plan discussed with: Spouse/sig other (P)      Name(s) and Number(s) SAQIB SIMEON (Significant other)   [phone] (P)      Transition of Care Barriers None (P)      Discharge Plan A Home with family (P)      Discharge Plan B Home (P)         Financial Resource Strain    How hard is it for you to pay for the very basics like food, housing, medical care, and heating? Somewhat hard (P)         Housing Stability    In the last 12 months, was there a time when you were not able to pay the mortgage or rent on time? Yes (P)      In the last 12 months, how many places have you lived? 1 (P)      In the last 12 months, was there a time when you did not have a  steady place to sleep or slept in a shelter (including now)? No (P)         Transportation Needs    In the past 12 months, has lack of transportation kept you from medical appointments or from getting medications? No (P)      In the past 12 months, has lack of transportation kept you from meetings, work, or from getting things needed for daily living? No (P)         Food Insecurity    Within the past 12 months, you worried that your food would run out before you got the money to buy more. Never true (P)      Within the past 12 months, the food you bought just didn't last and you didn't have money to get more. Never true (P)         Social Connections    In a typical week, how many times do you talk on the phone with family, friends, or neighbors? More than three times a week (P)      How often do you get together with friends or relatives? More than three times a week (P)      How often do you attend Mormonism or Worship services? Patient refused (P)      Do you belong to any clubs or organizations such as Mormonism groups, unions, fraternal or athletic groups, or school groups? Patient refused (P)      How often do you attend meetings of the clubs or organizations you belong to? Patient refused (P)      Are you , , , , never , or living with a partner?  (P)         Alcohol Use    Q1: How often do you have a drink containing alcohol? Never (P)      Q2: How many drinks containing alcohol do you have on a typical day when you are drinking? Patient does not drink (P)      Q3: How often do you have six or more drinks on one occasion? Never (P)         OTHER    Name(s) of People in Home SAQIB SIMEON (Significant other)   [phone] (P)                  Discharge Plan A and Plan B have been determined by review of patient's clinical status, future medical and therapeutic needs, and coverage/benefits for post-acute care in coordination with multidisciplinary team members.                    JAIRO Bailey, LMSW  [facility]  Case Management  Ext. [phone]

## 2023-10-29 NOTE — DISCHARGE SUMMARY
[facility] - Intensive Care ([place] [address])  [facility]  Discharge Summary      Patient Name: Christiano Dahl  MRN: [id number]  Admission Date: 10/27/2023  Hospital Length of Stay: 2 days  Discharge Date and Time:  10/29/2023 1:41 PM  Attending Physician: Oneil Myers MD   Discharging Provider: Oneil Myers MD  Discharge Provider Team: [facility] A  Primary Care Provider: Jaci Newton MD            * No surgery found *      Hospital Course: 62-year-old  man with a type 2 diabetes, hypertension, obesity, chronic pain, hidradenitis suppurativa on Remicade infusion presents to the emergency department for concerns of mental status change.  On presentation patient had significant hypokalemia, hypocalcemia, hypomagnesemia and hypoglycemia.  MRI brain was unremarkable for acute stroke.  Electrolytes and glucose were corrected.  Etiology was thought to be secondary to poor oral intake and chlorthalidone.  Mental status improved.  Nutrition was consulted.  Patient was eating well and was back to his baseline.  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  He was chronic leukocytosis, Hematology referral given upon discharge.  Patient is to follow up with his primary care.  He was ordered for PT OT evaluation, however decided he would like to go home and was not interested in therapy.  Discussed with Dermatology re his skin findings, he is an appointment to get his infusion in the near future for his hidradenitis. Per discussion with derm, we will do topical ketoconazole.  He had low vitamin-D and was started on supplementation.    Etiology of electrolyte derangements was likely due to chlorthalidone and poor oral intake, chlorthalidone was held and patient is to continue losartan.  Patient was prescribed oral potassium 10 mEq for 5 days and is to repeat his  TORY in 5 days and follow up with his primary care.      Physical Exam  Constitutional:       General: He is not in acute distress.     Appearance: He is obese.   HENT:      Head: Normocephalic.      Right Ear: External ear normal.      Left Ear: External ear normal.      Nose: Nose normal.   Cardiovascular:      Rate and Rhythm: Normal rate.   Pulmonary:      Effort: Pulmonary effort is normal.   Abdominal:      Palpations: Abdomen is soft.      Tenderness: There is no abdominal tenderness.   Skin:     Comments: Chronic changes, no acute changes to armpits.  Very mild hyperpigmentation noted to creases of armpits  Neurological:      Mental Status: He is alert and oriented to person, place, and time.   Psychiatric:         Mood and Affect: Mood normal.          Consults:   Consults (From admission, onward)          Status Ordering Provider     Inpatient consult to Registered Dietitian/Nutritionist  Once        Provider:  (Not yet assigned)    DERICK Bardales            Final Active Diagnoses:    Diagnosis Date Noted POA    Severe malnutrition [E43] 10/28/2023 Yes    Abnormal thyroid blood test [R79.89] 10/28/2023 Yes    Leucocytosis [D72.829] 10/28/2023 Yes    Intertrigo [L30.4] 10/27/2023 Yes    Controlled type 2 diabetes mellitus without complication, without long-term current use of insulin [E11.9] 08/20/2016 Yes    Hidradenitis suppurativa [L73.2] 05/04/2016 Yes    Essential hypertension [I10]  Yes     Chronic      Problems Resolved During this Admission:    Diagnosis Date Noted Date Resolved POA    PRINCIPAL PROBLEM:  Hypocalcemia [E83.51] 10/27/2023 10/29/2023 Yes    Hypomagnesemia [E83.42] 10/28/2023 10/28/2023 Yes    Hypokalemia [E87.6] 10/27/2023 10/29/2023 Yes    Hypoglycemia [E16.2] 10/27/2023 10/29/2023 Yes    Transient neurological symptoms [R29.818] 10/27/2023 10/29/2023 Yes      Discharged Condition: stable    Disposition: Home or Self Care    Follow Up:   Follow-up Information       Aman  Jaci GANDARA MD Follow up in 1 week(s).    Specialty: Internal Medicine  Contact information:  [address] [facility] [address]  [phone]                           Patient Instructions:      Ambulatory referral/consult to Smoking Cessation Program   Standing Status: Future   Referral Priority: Routine Referral Type: Consultation   Referral Reason: Specialty Services Required   Requested Specialty: CTTS   Number of Visits Requested: 1     Medications:  Reconciled Home Medications:      Medication List        START taking these medications      ketoconazole 2 % shampoo  Commonly known as: NIZORAL  Apply topically as needed for Itching.     nicotine 7 mg/24 hr  Commonly known as: NICODERM CQ  Place 1 patch onto the skin once daily.     vitamin D 1000 units Tab  Commonly known as: VITAMIN D3  Take 1 tablet (1,000 Units total) by mouth once daily.  Start taking on: October 30, 2023            CHANGE how you take these medications      losartan 100 MG tablet  Commonly known as: COZAAR  TAKE 1 TABLET(100 MG) BY MOUTH EVERY DAY FOR BLOOD PRESSURE  What changed: See the new instructions.            CONTINUE taking these medications      acetaminophen 500 MG tablet  Commonly known as: TYLENOL  Take 2 tablets (1,000 mg total) by mouth every 8 (eight) hours as needed for Pain.     atorvastatin 20 MG tablet  Commonly known as: LIPITOR  Take 20 mg by mouth once daily.     baclofen 10 MG tablet  Commonly known as: LIORESAL  Take 10 mg by mouth 2 (two) times daily.     CONTOUR TEST STRIPS Strp  Generic drug: blood sugar diagnostic  Use to check blood sugar once daily     gabapentin 800 MG tablet  Commonly known as: NEURONTIN  Take 800 mg by mouth 3 (three) times daily.     HYDROcodone-acetaminophen 7.5-325 mg per tablet  Commonly known as: NORCO  Take 1 tablet by mouth 3 (three) times daily as needed for Pain.     LIDOcaine 5 %  Commonly known as: LIDODERM  Place 1 patch onto the skin once daily. Remove & Discard patch within  12 hours or as directed by MD     metFORMIN 500 MG ER 24hr tablet  Commonly known as: GLUCOPHAGE-XR  Take 1,000 mg by mouth every morning.     sodium chloride 0.9% SolP with inFLIXimab 100 mg SolR 5 mg/kg  Inject 800 mg into the vein every 28 days.            STOP taking these medications      chlorthalidone 50 MG Tab  Commonly known as: HYGROTEN                  Pending Diagnostic Studies:       Procedure Component Value Units Date/Time    Vitamin B1 [[id number]] Collected: 10/28/23 0742    Order Status: Sent Lab Status: In process Updated: 10/28/23 0753    Specimen: Blood     Vitamin B6 [[id number]] Collected: 10/28/23 0742    Order Status: Sent Lab Status: In process Updated: 10/28/23 0753    Specimen: Blood           Indwelling Lines/Drains at time of discharge:   Lines/Drains/Airways       None                   Time spent on the discharge of patient: 40 minutes         Oneil Myers MD  Department of Hospital Medicine  [facility] - Intensive Care ([place]-16)

## 2023-10-30 LAB — POCT GLUCOSE: 97 MG/DL (ref 70–110)

## 2023-10-30 NOTE — PLAN OF CARE
[facility] - Intensive Care ([facility]-16)  Discharge Final Note    Primary Care Provider: Jaci Newton MD    Expected Discharge Date: 10/29/2023    Final Discharge Note (most recent)       Final Note - 10/30/23 1113          Final Note    Assessment Type Final Discharge Note (P)      Anticipated Discharge Disposition Home or Self Care (P)      What phone number can be called within the next 1-3 days to see how you are doing after discharge? [phone] (P)         Post-Acute Status    Post-Acute Authorization Other (P)      [facility] [place] [facility]      Other Status No Post-Acute Service Needs (P)                      Important Message from Medicare             Contact Info       Jaci Newton MD   Specialty: Internal Medicine   Relationship: PCP - General    [address] [facility] [address]   Phone: [phone]       Next Steps: Follow up in 1 week(s)          Patient dc home.              JAIRO Bailey, LMSW  [facility]  Case Management  Ext. [phone]

## 2023-11-01 LAB — VIT B1 BLD-MCNC: 47 UG/L (ref 38–122)

## 2023-11-06 LAB — PYRIDOXAL SERPL-MCNC: <2 UG/L (ref 5–50)

## 2023-11-07 ENCOUNTER — HOSPITAL ENCOUNTER (EMERGENCY)
Facility: HOSPITAL | Age: 63
Discharge: HOME | End: 2023-11-07
Attending: EMERGENCY MEDICINE
Payer: MEDICAID

## 2023-11-07 VITALS
OXYGEN SATURATION: 96 % | HEART RATE: 73 BPM | RESPIRATION RATE: 18 BRPM | SYSTOLIC BLOOD PRESSURE: 178 MMHG | DIASTOLIC BLOOD PRESSURE: 89 MMHG | TEMPERATURE: 99 F

## 2023-11-07 DIAGNOSIS — J06.9 UPPER RESPIRATORY TRACT INFECTION, UNSPECIFIED TYPE: Primary | ICD-10-CM

## 2023-11-07 DIAGNOSIS — H91.93 ACUTE HEARING LOSS, BILATERAL: ICD-10-CM

## 2023-11-07 PROCEDURE — 99281 EMR DPT VST MAYX REQ PHY/QHP: CPT

## 2023-11-08 NOTE — ED PROVIDER NOTES
Chief Complaint   Ear Problem (States can't hear out of bilat ears x4 days. Denies pain)      History Of Present Illness   Christiano Dahl is a 62 y.o. male presenting with bilateral hearing loss that has been present for 4 days.  He still is able to have conversations, he just has noticed that his hearing is not quite as sharp.  It is equivalent on both sides.  He has noticed a little nasal congestion.  Denies any earache.  No fever or chills.  No cough.  No eye pain or irritation.        Review of patient's allergies indicates:  No Known Allergies    No current facility-administered medications on file prior to encounter.     Current Outpatient Medications on File Prior to Encounter   Medication Sig Dispense Refill    acetaminophen (TYLENOL) 500 MG tablet Take 2 tablets (1,000 mg total) by mouth every 8 (eight) hours as needed for Pain. 30 tablet 0    ascorbic acid, vitamin C, (VITAMIN C) 500 MG tablet Take 1 tablet (500 mg total) by mouth once daily. 30 tablet 5    atorvastatin (LIPITOR) 20 MG tablet Take 20 mg by mouth once daily.      baclofen (LIORESAL) 10 MG tablet Take 10 mg by mouth 2 (two) times daily.      CONTOUR TEST STRIPS Strp Use to check blood sugar once daily 100 each 3    gabapentin (NEURONTIN) 800 MG tablet Take 800 mg by mouth 3 (three) times daily.      ketoconazole (NIZORAL) 2 % shampoo Apply topically as needed for Itching. 200 mL 0    LIDOcaine (LIDODERM) 5 % Place 1 patch onto the skin once daily. Remove & Discard patch within 12 hours or as directed by MD 15 patch 0    losartan (COZAAR) 100 MG tablet TAKE 1 TABLET(100 MG) BY MOUTH EVERY DAY FOR BLOOD PRESSURE (Patient taking differently: Take 100 mg by mouth once daily. TAKE 1 TABLET(100 MG) BY MOUTH EVERY DAY FOR BLOOD PRESSURE) 90 tablet 0    metFORMIN (GLUCOPHAGE-XR) 500 MG ER 24hr tablet Take 1,000 mg by mouth every morning.      nicotine (NICODERM CQ) 7 mg/24 hr Place 1 patch onto the skin once daily. 30 patch 0    sodium chloride  0.9% SolP with inFLIXimab 100 mg SolR 5 mg/kg Inject 800 mg into the vein every 28 days.      vitamin D (VITAMIN D3) 1000 units Tab Take 1 tablet (1,000 Units total) by mouth once daily. 30 tablet 3       Past History   As per HPI and below:  Past Medical History:   Diagnosis Date    C. difficile colitis 2013    hospitalized for 9 days    Diabetes mellitus     Diverticulitis 2014    required hospitalization    Hypertension     Pneumonia 2014     Past Surgical History:   Procedure Laterality Date    ABCESS DRAINAGE  03/2016    perineum       Social History     Socioeconomic History    Marital status: Significant Other   Tobacco Use    Smoking status: Every Day     Current packs/day: 1.00     Average packs/day: 1 pack/day for 30.0 years (30.0 ttl pk-yrs)     Types: Cigarettes    Smokeless tobacco: Never    Tobacco comments:     30 years, has quit a few times in the past   Substance and Sexual Activity    Alcohol use: Yes     Comment: soc    Sexual activity: Yes     Partners: Female     Comment: in a relationship   Social History Narrative    Employed - works as  at [employer] in the , has worked there ~20 years.     Previously lived in [address], moved to [place].    Previously in relationship; no longer as of 2019    Mother passed away around [id number]     Social Determinants of Health     Financial Resource Strain: Medium Risk (10/29/2023)    Overall Financial Resource Strain (CARDIA)     Difficulty of Paying Living Expenses: Somewhat hard   Food Insecurity: No Food Insecurity (10/29/2023)    Hunger Vital Sign     Worried About Running Out of Food in the Last Year: Never true     Ran Out of Food in the Last Year: Never true   Transportation Needs: No Transportation Needs (10/29/2023)    PRAPARE - Transportation     Lack of Transportation (Medical): No     Lack of Transportation (Non-Medical): No   Social Connections: Unknown (10/29/2023)    Social Connection and Isolation Panel [NHANES]      Frequency of Communication with Friends and Family: More than three times a week     Frequency of Social Gatherings with Friends and Family: More than three times a week     Attends Christian Services: Patient refused     Active Member of Clubs or Organizations: Patient refused     Attends Club or Organization Meetings: Patient refused     Marital Status:    Housing Stability: High Risk (10/29/2023)    Housing Stability Vital Sign     Unable to Pay for Housing in the Last Year: Yes     Number of Places Lived in the Last Year: 1     Unstable Housing in the Last Year: No       Family History   Problem Relation Age of Onset    Diabetes Father     Cancer Paternal Grandmother         lung (smoker)    Diabetes Paternal Grandfather     Diabetes Other     Heart disease Mother        Physical Exam     Vitals:    11/07/23 2205   BP: (!) 178/89   BP Location: Right arm   Patient Position: Sitting   Pulse: 73   Resp: 18   Temp: 98.6 °F (37 °C)   TempSrc: Oral   SpO2: 96%     Appearance: No acute distress.  Skin: No rashes seen.  Good turgor.  No abrasions.  No ecchymoses.  Eyes: No conjunctival injection.  ENT: TM's clear.  Right EAC clear, left EAC with minimal cerumen.    Chest: Clear to auscultation bilaterally.  Good air movement.  No wheezes.  No rhonchi.  Cardiovascular: Regular rate and rhythm.  No murmurs. No gallops. No rubs.  Abdomen: Soft.  Not distended.  Nontender.  No guarding.  No rebound.  Musculoskeletal: Good range of motion all joints.  No deformities.  Neck supple.  No meningismus.  Neurologic: Motor intact.  Sensation intact.  Cerebellar intact.  Cranial nerves intact.  Mental Status:  Alert and oriented x 3.  Appropriate, conversant.        Medications Given   Medications - No data to display    Results and Course   Labs Reviewed - No data to display    Imaging Results    None                  MDM, Impression and Plan   62 y.o. male with bilateral decreased hearing for 4 days.  Nonlocalizing, no  neuro symptoms, doubt central issue.  TMs are clear and there is no cerumen obstruction.  Think it is likely an upper respiratory infection given his reported congestion.  Nevertheless, will refer to ENT for testing.  He did respond normally to conversation with me, and I speak faster and less audibly than the average [facility].  His blood pressure is slightly elevated, and he is compliant with his medications.  He has no symptoms of target organ dysfunction and thus does not need workup at this time.  Recommend that he follow-up with his PCP.  His right eye is ever so slightly more red than the left, but he has no pain, irritation or visual changes.  He states that he rubbed it just prior to coming into the room.  I do not feel further workup is necessary in the ED.         Final diagnoses:  [J06.9] Upper respiratory tract infection, unspecified type (Primary)  [H91.93] Acute hearing loss, bilateral        ED Disposition Condition    Discharge Stable          ED Prescriptions    None       Follow-up Information       Follow up With Specialties Details Why Contact Info Additional Information    Jaci Newton MD Internal Medicine In 1 week  [address] [facility] [address]  [phone]       [facility] [address] Otolaryngology Call   [address] [facility] [address] [phone] [facility] - Main Building, 4th Floor Please park in [facility] and use Clinic elevator               Oneil Ramos MD  11/07/23 1838

## 2023-11-08 NOTE — ED TRIAGE NOTES
Christiano Dahl, a 62 y.o. male presents to the ED w/ complaint of ear pain. Reports not being able to hear out of both ears x 4 days. Denies pain/ any drainage    Adult Physical Assessment  LOC: Christiano Dahl, 62 y.o. male verified via two identifiers.  The patient is awake, alert, oriented and speaking appropriately at this time.  APPEARANCE: Patient resting comfortably and appears to be in no acute distress at this time. Patient is clean and well groomed, patient's clothing is properly fastened.  SKIN:The skin is warm and dry, color consistent with ethnicity, patient has normal skin turgor and moist mucus membranes, skin intact, no breakdown or brusing noted.  MUSCULOSKELETAL: Patient moving all extremities well, no obvious swelling or deformities noted.  RESPIRATORY: Airway is open and patent, respirations are spontaneous, patient has a normal effort and rate, no accessory muscle use noted.  CARDIAC: Patient has a normal rate and rhythm, no periphreal edema noted in any extremity, capillary refill < 3 seconds in all extremities  ABDOMEN: Soft and non tender to palpation, no abdominal distention noted. Bowel sounds present in all four quadrants.  NEUROLOGIC: Eyes open spontaneously, behavior appropriate to situation, follows commands, facial expression symmetrical, bilateral hand grasp equal and even, purposeful motor response noted, normal sensation in all extremities when touched with a finger. Patient reports ear pain x 4 days. Denies pain or any drainage      Triage note:  Chief Complaint   Patient presents with    Ear Problem     States can't hear out of bilat ears x4 days. Denies pain     Review of patient's allergies indicates:  No Known Allergies  Past Medical History:   Diagnosis Date    C. difficile colitis 2013    hospitalized for 9 days    Diabetes mellitus     Diverticulitis 2014    required hospitalization    Hypertension     Pneumonia 2014

## 2023-11-09 ENCOUNTER — HOSPITAL ENCOUNTER (EMERGENCY)
Facility: HOSPITAL | Age: 63
Discharge: HOME | End: 2023-11-09
Attending: EMERGENCY MEDICINE
Payer: MEDICAID

## 2023-11-09 VITALS
WEIGHT: 226 LBS | SYSTOLIC BLOOD PRESSURE: 181 MMHG | BODY MASS INDEX: 35.47 KG/M2 | HEART RATE: 71 BPM | OXYGEN SATURATION: 95 % | HEIGHT: 67 IN | RESPIRATION RATE: 16 BRPM | TEMPERATURE: 99 F | DIASTOLIC BLOOD PRESSURE: 86 MMHG

## 2023-11-09 DIAGNOSIS — U07.1 COVID-19: Primary | ICD-10-CM

## 2023-11-09 LAB — SARS-COV-2 RDRP RESP QL NAA+PROBE: POSITIVE

## 2023-11-09 PROCEDURE — 99282 EMERGENCY DEPT VISIT SF MDM: CPT

## 2023-11-09 PROCEDURE — U0002 COVID-19 LAB TEST NON-CDC: HCPCS | Performed by: EMERGENCY MEDICINE

## 2023-11-10 DIAGNOSIS — U07.1 COVID-19 VIRUS DETECTED: ICD-10-CM

## 2023-11-10 NOTE — ED PROVIDER NOTES
Encounter Date: 11/9/2023       History     Chief Complaint   Patient presents with    COVID-19 Concerns     Reports exposed at wedding to Covid. Symptoms started 2 days ago. Endorses cough, congestion, fever, and runny nose.      HPI    62-year-old male with a past medical history of diabetes, hypertension who presents with congestion and a cough.  Symptoms started about 4-5 days ago.  His partner has similar symptoms.  Symptoms started before he left for a wedding however did worsened when he got back.  There was an exposure to COVID at the sweating on the 5th.  He denies any fevers or chills.  Still eating and drinking well.  No vomiting or diarrhea.  No chest pain.  He is not immunocompromised.  Not vaccinated.    Review of patient's allergies indicates:  No Known Allergies  Past Medical History:   Diagnosis Date    C. difficile colitis 2013    hospitalized for 9 days    Diabetes mellitus     Diverticulitis 2014    required hospitalization    Hypertension     Pneumonia 2014     Past Surgical History:   Procedure Laterality Date    ABCESS DRAINAGE  03/2016    perineum     Family History   Problem Relation Age of Onset    Diabetes Father     Cancer Paternal Grandmother         lung (smoker)    Diabetes Paternal Grandfather     Diabetes Other     Heart disease Mother      Social History     Tobacco Use    Smoking status: Every Day     Current packs/day: 1.00     Average packs/day: 1 pack/day for 30.0 years (30.0 ttl pk-yrs)     Types: Cigarettes    Smokeless tobacco: Never    Tobacco comments:     30 years, has quit a few times in the past   Substance Use Topics    Alcohol use: Yes     Comment: soc     Review of Systems    Physical Exam     Initial Vitals [11/09/23 2141]   BP Pulse Resp Temp SpO2   -- 73 18 98.9 °F (37.2 °C) 96 %      MAP       --         Physical Exam    Nursing note and vitals reviewed.  Constitutional: He appears well-developed and well-nourished. No distress.   HENT:   Head: Normocephalic and  atraumatic.   Nose: Nose normal.   Eyes: Conjunctivae and EOM are normal. Pupils are equal, round, and reactive to light.   Neck: Neck supple.   Normal range of motion.  Cardiovascular:  Normal rate, regular rhythm and normal heart sounds.     Exam reveals no gallop and no friction rub.       No murmur heard.  Pulmonary/Chest: Breath sounds normal. No respiratory distress. He has no wheezes. He has no rhonchi. He has no rales.   Abdominal: Abdomen is soft. He exhibits no distension. There is no abdominal tenderness. There is no rebound and no guarding.   Musculoskeletal:         General: No tenderness or edema. Normal range of motion.      Cervical back: Normal range of motion and neck supple.     Neurological: He is alert and oriented to person, place, and time. He has normal strength.   Skin: Skin is warm and dry. Capillary refill takes less than 2 seconds.   Psychiatric: He has a normal mood and affect.         ED Course   Procedures  Labs Reviewed   SARS-COV-2 RNA AMPLIFICATION, QUAL - Abnormal; Notable for the following components:       Result Value    SARS-CoV-2 RNA, Amplification, Qual Positive (*)     All other components within normal limits          Imaging Results    None          Medications - No data to display  Medical Decision Making  62-year-old male who viral-like symptoms.  Sounds like they started before he went to a wedding but worsened afterward.  He and his partner were exposed to COVID.  Not vaccinated.  Not immunocompromised.  Overall he looks very well.  Has reassuring vital signs.  Normal lung exam.  Does not appear dehydrated.  Not hypoxic.  Because of the unclear timeline of symptoms starting, would not be a candidate for antivirals.  Also looks very well.  We will test for their own purposes but likely will be discharged home with symptomatic management at home and return precautions.    COVID +. Stable for dc with return precuations                               Clinical Impression:    Final diagnoses:  [U07.1] COVID-19 (Primary)        ED Disposition Condition    Discharge Stable          ED Prescriptions    None       Follow-up Information    None          Mis Langford MD  11/09/23 1251

## 2023-11-11 ENCOUNTER — NURSE TRIAGE (OUTPATIENT)
Dept: ADMINISTRATIVE | Facility: CLINIC | Age: 63
End: 2023-11-11
Payer: MEDICAID

## 2023-11-11 ENCOUNTER — PATIENT MESSAGE (OUTPATIENT)
Dept: HEPATOLOGY | Facility: HOSPITAL | Age: 63
End: 2023-11-11
Payer: MEDICAID

## 2023-11-11 PROBLEM — E53.1 PYRIDOXINE DEFICIENCY: Status: ACTIVE | Noted: 2023-11-11

## 2023-11-11 NOTE — TELEPHONE ENCOUNTER
[facility] NT return [facility] call -  Pt wife, Keyur, reports pt having coughing productive white clear sputum and SOB. Covid 19 protocol followed and advised  to go to ED now or contact PCP. Pt does have non [facility] PCP.       Reason for Disposition   Chest pain or pressure  (Exception: MILD central chest pain, present only when coughing.)    Additional Information   Negative: SEVERE difficulty breathing (e.g., struggling for each breath, speaks in single words)   Negative: Difficult to awaken or acting confused (e.g., disoriented, slurred speech)   Negative: Bluish (or gray) lips or face now   Negative: Shock suspected (e.g., cold/pale/clammy skin, too weak to stand, low BP, rapid pulse)   Negative: Sounds like a life-threatening emergency to the triager   Negative: SEVERE or constant chest pain or pressure  (Exception: Mild central chest pain, present only when coughing.)   Negative: MODERATE difficulty breathing (e.g., speaks in phrases, SOB even at rest, pulse 100-120)   Negative: [1] Headache AND [2] stiff neck (can't touch chin to chest)   Negative: Oxygen level (e.g., pulse oximetry) 90 percent or lower    Protocols used: Coronavirus (COVID-19) Diagnosed or [id number]

## 2023-11-19 ENCOUNTER — HOSPITAL ENCOUNTER (INPATIENT)
Facility: HOSPITAL | Age: 63
LOS: 2 days | Discharge: HOME | DRG: 291 | End: 2023-11-21
Attending: EMERGENCY MEDICINE | Admitting: EMERGENCY MEDICINE
Payer: MEDICAID

## 2023-11-19 DIAGNOSIS — S32.010D COMPRESSION FRACTURE OF L1 VERTEBRA WITH ROUTINE HEALING, SUBSEQUENT ENCOUNTER: ICD-10-CM

## 2023-11-19 DIAGNOSIS — R79.89 ELEVATED BRAIN NATRIURETIC PEPTIDE (BNP) LEVEL: ICD-10-CM

## 2023-11-19 DIAGNOSIS — I50.33 ACUTE ON CHRONIC DIASTOLIC CONGESTIVE HEART FAILURE: Primary | ICD-10-CM

## 2023-11-19 DIAGNOSIS — J96.01 ACUTE RESPIRATORY FAILURE WITH HYPOXIA: ICD-10-CM

## 2023-11-19 DIAGNOSIS — B35.1 TOENAIL FUNGUS: ICD-10-CM

## 2023-11-19 DIAGNOSIS — R06.02 SOB (SHORTNESS OF BREATH): ICD-10-CM

## 2023-11-19 DIAGNOSIS — I10 ESSENTIAL HYPERTENSION: Chronic | ICD-10-CM

## 2023-11-19 PROBLEM — S32.000A LUMBAR COMPRESSION FRACTURE: Status: ACTIVE | Noted: 2023-11-19

## 2023-11-19 PROBLEM — I16.1 HYPERTENSIVE EMERGENCY: Status: ACTIVE | Noted: 2023-11-19

## 2023-11-19 PROBLEM — J81.1 PULMONARY EDEMA: Status: ACTIVE | Noted: 2023-11-19

## 2023-11-19 PROBLEM — E66.9 OBESITY (BMI 30-39.9): Status: ACTIVE | Noted: 2023-11-19

## 2023-11-19 PROBLEM — J18.9 CAP (COMMUNITY ACQUIRED PNEUMONIA): Status: ACTIVE | Noted: 2023-11-19

## 2023-11-19 PROBLEM — D84.9 IMMUNOSUPPRESSED STATUS: Status: ACTIVE | Noted: 2023-11-19

## 2023-11-19 LAB
ALBUMIN SERPL BCP-MCNC: 2.6 G/DL (ref 3.5–5.2)
ALP SERPL-CCNC: 120 U/L (ref 55–135)
ALT SERPL W/O P-5'-P-CCNC: 12 U/L (ref 10–44)
ANION GAP SERPL CALC-SCNC: 11 MMOL/L (ref 8–16)
AST SERPL-CCNC: 9 U/L (ref 10–40)
BASOPHILS # BLD AUTO: 0.03 K/UL (ref 0–0.2)
BASOPHILS NFR BLD: 0.3 % (ref 0–1.9)
BILIRUB SERPL-MCNC: 0.6 MG/DL (ref 0.1–1)
BNP SERPL-MCNC: 299 PG/ML (ref 0–99)
BUN SERPL-MCNC: 11 MG/DL (ref 8–23)
CALCIUM SERPL-MCNC: 8.7 MG/DL (ref 8.7–10.5)
CHLORIDE SERPL-SCNC: 109 MMOL/L (ref 95–110)
CO2 SERPL-SCNC: 25 MMOL/L (ref 23–29)
CREAT SERPL-MCNC: 0.8 MG/DL (ref 0.5–1.4)
DIFFERENTIAL METHOD: ABNORMAL
EOSINOPHIL # BLD AUTO: 0.3 K/UL (ref 0–0.5)
EOSINOPHIL NFR BLD: 2.6 % (ref 0–8)
ERYTHROCYTE [DISTWIDTH] IN BLOOD BY AUTOMATED COUNT: 17.5 % (ref 11.5–14.5)
EST. GFR  (NO RACE VARIABLE): >60 ML/MIN/1.73 M^2
GLUCOSE SERPL-MCNC: 120 MG/DL (ref 70–110)
HCT VFR BLD AUTO: 33.1 % (ref 40–54)
HGB BLD-MCNC: 10.1 G/DL (ref 14–18)
IMM GRANULOCYTES # BLD AUTO: 0.06 K/UL (ref 0–0.04)
IMM GRANULOCYTES NFR BLD AUTO: 0.6 % (ref 0–0.5)
INFLUENZA A, MOLECULAR: NOT DETECTED
INFLUENZA B, MOLECULAR: NOT DETECTED
LYMPHOCYTES # BLD AUTO: 2.2 K/UL (ref 1–4.8)
LYMPHOCYTES NFR BLD: 21 % (ref 18–48)
MCH RBC QN AUTO: 26.1 PG (ref 27–31)
MCHC RBC AUTO-ENTMCNC: 30.5 G/DL (ref 32–36)
MCV RBC AUTO: 86 FL (ref 82–98)
MONOCYTES # BLD AUTO: 0.7 K/UL (ref 0.3–1)
MONOCYTES NFR BLD: 6.6 % (ref 4–15)
NEUTROPHILS # BLD AUTO: 7.4 K/UL (ref 1.8–7.7)
NEUTROPHILS NFR BLD: 68.9 % (ref 38–73)
NRBC BLD-RTO: 0 /100 WBC
PLATELET # BLD AUTO: 317 K/UL (ref 150–450)
PMV BLD AUTO: 9 FL (ref 9.2–12.9)
POC CARDIAC TROPONIN I: 0 NG/ML (ref 0–0.08)
POC CARDIAC TROPONIN I: 0.01 NG/ML (ref 0–0.08)
POCT GLUCOSE: 145 MG/DL (ref 70–110)
POCT GLUCOSE: 99 MG/DL (ref 70–110)
POTASSIUM SERPL-SCNC: 3.4 MMOL/L (ref 3.5–5.1)
PROCALCITONIN SERPL IA-MCNC: 0.02 NG/ML
PROT SERPL-MCNC: 7.4 G/DL (ref 6–8.4)
RBC # BLD AUTO: 3.87 M/UL (ref 4.6–6.2)
RSV AG BY MOLECULAR METHOD: NOT DETECTED
SAMPLE: NORMAL
SAMPLE: NORMAL
SARS-COV-2 RNA RESP QL NAA+PROBE: NOT DETECTED
SODIUM SERPL-SCNC: 145 MMOL/L (ref 136–145)
TROPONIN I SERPL DL<=0.01 NG/ML-MCNC: 0.01 NG/ML (ref 0–0.03)
TROPONIN I SERPL DL<=0.01 NG/ML-MCNC: 0.02 NG/ML (ref 0–0.03)
WBC # BLD AUTO: 10.68 K/UL (ref 3.9–12.7)

## 2023-11-19 PROCEDURE — G0378 HOSPITAL OBSERVATION PER HR: HCPCS

## 2023-11-19 PROCEDURE — 84484 ASSAY OF TROPONIN QUANT: CPT

## 2023-11-19 PROCEDURE — 96365 THER/PROPH/DIAG IV INF INIT: CPT

## 2023-11-19 PROCEDURE — 93005 ELECTROCARDIOGRAM TRACING: CPT

## 2023-11-19 PROCEDURE — 21400001 HC TELEMETRY ROOM

## 2023-11-19 PROCEDURE — 25500020 PHARM REV CODE 255: Performed by: FAMILY MEDICINE

## 2023-11-19 PROCEDURE — 63600175 PHARM REV CODE 636 W HCPCS

## 2023-11-19 PROCEDURE — 83880 ASSAY OF NATRIURETIC PEPTIDE: CPT | Performed by: EMERGENCY MEDICINE

## 2023-11-19 PROCEDURE — 85025 COMPLETE CBC W/AUTO DIFF WBC: CPT | Performed by: EMERGENCY MEDICINE

## 2023-11-19 PROCEDURE — S4991 NICOTINE PATCH NONLEGEND: HCPCS | Performed by: FAMILY MEDICINE

## 2023-11-19 PROCEDURE — 93010 EKG 12-LEAD: ICD-10-PCS | Mod: ,,, | Performed by: INTERNAL MEDICINE

## 2023-11-19 PROCEDURE — 25000003 PHARM REV CODE 250

## 2023-11-19 PROCEDURE — 84484 ASSAY OF TROPONIN QUANT: CPT | Mod: 91 | Performed by: EMERGENCY MEDICINE

## 2023-11-19 PROCEDURE — 96367 TX/PROPH/DG ADDL SEQ IV INF: CPT

## 2023-11-19 PROCEDURE — 96375 TX/PRO/DX INJ NEW DRUG ADDON: CPT

## 2023-11-19 PROCEDURE — 63600175 PHARM REV CODE 636 W HCPCS: Performed by: FAMILY MEDICINE

## 2023-11-19 PROCEDURE — 25000003 PHARM REV CODE 250: Performed by: FAMILY MEDICINE

## 2023-11-19 PROCEDURE — 94761 N-INVAS EAR/PLS OXIMETRY MLT: CPT

## 2023-11-19 PROCEDURE — 99285 EMERGENCY DEPT VISIT HI MDM: CPT | Mod: 25

## 2023-11-19 PROCEDURE — 93010 ELECTROCARDIOGRAM REPORT: CPT | Mod: ,,, | Performed by: INTERNAL MEDICINE

## 2023-11-19 PROCEDURE — 80053 COMPREHEN METABOLIC PANEL: CPT | Performed by: EMERGENCY MEDICINE

## 2023-11-19 PROCEDURE — 96372 THER/PROPH/DIAG INJ SC/IM: CPT | Performed by: FAMILY MEDICINE

## 2023-11-19 PROCEDURE — 84145 PROCALCITONIN (PCT): CPT | Performed by: FAMILY MEDICINE

## 2023-11-19 PROCEDURE — 0241U SARS-COV2 (COVID) WITH FLU/RSV BY PCR: CPT | Performed by: EMERGENCY MEDICINE

## 2023-11-19 RX ORDER — IBUPROFEN 200 MG
24 TABLET ORAL
Status: DISCONTINUED | OUTPATIENT
Start: 2023-11-19 | End: 2023-11-21 | Stop reason: HOSPADM

## 2023-11-19 RX ORDER — FLUTICASONE PROPIONATE 50 MCG
2 SPRAY, SUSPENSION (ML) NASAL DAILY
Status: DISCONTINUED | OUTPATIENT
Start: 2023-11-19 | End: 2023-11-21 | Stop reason: HOSPADM

## 2023-11-19 RX ORDER — DOXYCYCLINE HYCLATE 100 MG
100 TABLET ORAL EVERY 12 HOURS
Status: DISCONTINUED | OUTPATIENT
Start: 2023-11-19 | End: 2023-11-21 | Stop reason: HOSPADM

## 2023-11-19 RX ORDER — ALBUTEROL SULFATE 90 UG/1
2 AEROSOL, METERED RESPIRATORY (INHALATION) EVERY 6 HOURS PRN
Status: DISCONTINUED | OUTPATIENT
Start: 2023-11-19 | End: 2023-11-21 | Stop reason: HOSPADM

## 2023-11-19 RX ORDER — CHOLECALCIFEROL (VITAMIN D3) 25 MCG
1000 TABLET ORAL DAILY
Status: DISCONTINUED | OUTPATIENT
Start: 2023-11-19 | End: 2023-11-21 | Stop reason: HOSPADM

## 2023-11-19 RX ORDER — HYDRALAZINE HYDROCHLORIDE 25 MG/1
25 TABLET, FILM COATED ORAL EVERY 6 HOURS PRN
Status: DISCONTINUED | OUTPATIENT
Start: 2023-11-19 | End: 2023-11-21 | Stop reason: HOSPADM

## 2023-11-19 RX ORDER — POTASSIUM CHLORIDE 20 MEQ/1
40 TABLET, EXTENDED RELEASE ORAL ONCE
Status: COMPLETED | OUTPATIENT
Start: 2023-11-19 | End: 2023-11-19

## 2023-11-19 RX ORDER — INSULIN ASPART 100 [IU]/ML
0-5 INJECTION, SOLUTION INTRAVENOUS; SUBCUTANEOUS
Status: DISCONTINUED | OUTPATIENT
Start: 2023-11-19 | End: 2023-11-21 | Stop reason: HOSPADM

## 2023-11-19 RX ORDER — ASCORBIC ACID 500 MG
500 TABLET ORAL DAILY
Status: DISCONTINUED | OUTPATIENT
Start: 2023-11-19 | End: 2023-11-21 | Stop reason: HOSPADM

## 2023-11-19 RX ORDER — GUAIFENESIN 600 MG/1
1200 TABLET, EXTENDED RELEASE ORAL 2 TIMES DAILY
Status: DISCONTINUED | OUTPATIENT
Start: 2023-11-19 | End: 2023-11-21 | Stop reason: HOSPADM

## 2023-11-19 RX ORDER — SODIUM CHLORIDE 0.9 % (FLUSH) 0.9 %
10 SYRINGE (ML) INJECTION EVERY 12 HOURS PRN
Status: DISCONTINUED | OUTPATIENT
Start: 2023-11-19 | End: 2023-11-21 | Stop reason: HOSPADM

## 2023-11-19 RX ORDER — ENOXAPARIN SODIUM 100 MG/ML
40 INJECTION SUBCUTANEOUS EVERY 24 HOURS
Status: DISCONTINUED | OUTPATIENT
Start: 2023-11-19 | End: 2023-11-21 | Stop reason: HOSPADM

## 2023-11-19 RX ORDER — GLUCAGON 1 MG
1 KIT INJECTION
Status: DISCONTINUED | OUTPATIENT
Start: 2023-11-19 | End: 2023-11-21 | Stop reason: HOSPADM

## 2023-11-19 RX ORDER — CYANOCOBALAMIN (VITAMIN B-12) 250 MCG
1000 TABLET ORAL DAILY
Status: DISCONTINUED | OUTPATIENT
Start: 2023-11-19 | End: 2023-11-21 | Stop reason: HOSPADM

## 2023-11-19 RX ORDER — HYDROCODONE BITARTRATE AND ACETAMINOPHEN 10; 325 MG/1; MG/1
1 TABLET ORAL EVERY 4 HOURS PRN
Status: DISCONTINUED | OUTPATIENT
Start: 2023-11-19 | End: 2023-11-21 | Stop reason: HOSPADM

## 2023-11-19 RX ORDER — POTASSIUM CHLORIDE 20 MEQ/1
20 TABLET, EXTENDED RELEASE ORAL ONCE
Status: COMPLETED | OUTPATIENT
Start: 2023-11-19 | End: 2023-11-19

## 2023-11-19 RX ORDER — NIFEDIPINE 30 MG/1
30 TABLET, EXTENDED RELEASE ORAL DAILY
Status: DISCONTINUED | OUTPATIENT
Start: 2023-11-19 | End: 2023-11-20

## 2023-11-19 RX ORDER — IBUPROFEN 200 MG
1 TABLET ORAL DAILY
Status: DISCONTINUED | OUTPATIENT
Start: 2023-11-19 | End: 2023-11-21 | Stop reason: HOSPADM

## 2023-11-19 RX ORDER — FUROSEMIDE 10 MG/ML
40 INJECTION INTRAMUSCULAR; INTRAVENOUS 2 TIMES DAILY
Status: DISCONTINUED | OUTPATIENT
Start: 2023-11-19 | End: 2023-11-21 | Stop reason: HOSPADM

## 2023-11-19 RX ORDER — LOSARTAN POTASSIUM 50 MG/1
100 TABLET ORAL DAILY
Status: DISCONTINUED | OUTPATIENT
Start: 2023-11-19 | End: 2023-11-21 | Stop reason: HOSPADM

## 2023-11-19 RX ORDER — PROCHLORPERAZINE EDISYLATE 5 MG/ML
5 INJECTION INTRAMUSCULAR; INTRAVENOUS EVERY 6 HOURS PRN
Status: DISCONTINUED | OUTPATIENT
Start: 2023-11-19 | End: 2023-11-21 | Stop reason: HOSPADM

## 2023-11-19 RX ORDER — ACETAMINOPHEN 325 MG/1
650 TABLET ORAL EVERY 4 HOURS PRN
Status: DISCONTINUED | OUTPATIENT
Start: 2023-11-19 | End: 2023-11-21 | Stop reason: HOSPADM

## 2023-11-19 RX ORDER — LOSARTAN POTASSIUM 50 MG/1
100 TABLET ORAL DAILY
Status: DISCONTINUED | OUTPATIENT
Start: 2023-11-20 | End: 2023-11-19

## 2023-11-19 RX ORDER — FOLIC ACID 1 MG/1
1 TABLET ORAL DAILY
Status: DISCONTINUED | OUTPATIENT
Start: 2023-11-19 | End: 2023-11-21 | Stop reason: HOSPADM

## 2023-11-19 RX ORDER — ATORVASTATIN CALCIUM 20 MG/1
20 TABLET, FILM COATED ORAL DAILY
Status: DISCONTINUED | OUTPATIENT
Start: 2023-11-19 | End: 2023-11-21 | Stop reason: HOSPADM

## 2023-11-19 RX ORDER — AMOXICILLIN 250 MG
1 CAPSULE ORAL 2 TIMES DAILY PRN
Status: DISCONTINUED | OUTPATIENT
Start: 2023-11-19 | End: 2023-11-21 | Stop reason: HOSPADM

## 2023-11-19 RX ORDER — IBUPROFEN 200 MG
16 TABLET ORAL
Status: DISCONTINUED | OUTPATIENT
Start: 2023-11-19 | End: 2023-11-21 | Stop reason: HOSPADM

## 2023-11-19 RX ORDER — ACETAMINOPHEN 325 MG/1
650 TABLET ORAL EVERY 8 HOURS PRN
Status: DISCONTINUED | OUTPATIENT
Start: 2023-11-19 | End: 2023-11-21 | Stop reason: HOSPADM

## 2023-11-19 RX ORDER — HYDROCODONE BITARTRATE AND ACETAMINOPHEN 10; 325 MG/1; MG/1
1 TABLET ORAL EVERY 6 HOURS PRN
Status: DISCONTINUED | OUTPATIENT
Start: 2023-11-19 | End: 2023-11-19

## 2023-11-19 RX ORDER — POLYETHYLENE GLYCOL 3350 17 G/17G
17 POWDER, FOR SOLUTION ORAL 2 TIMES DAILY PRN
Status: DISCONTINUED | OUTPATIENT
Start: 2023-11-19 | End: 2023-11-21 | Stop reason: HOSPADM

## 2023-11-19 RX ORDER — FUROSEMIDE 10 MG/ML
40 INJECTION INTRAMUSCULAR; INTRAVENOUS
Status: COMPLETED | OUTPATIENT
Start: 2023-11-19 | End: 2023-11-19

## 2023-11-19 RX ORDER — TALC
6 POWDER (GRAM) TOPICAL NIGHTLY PRN
Status: DISCONTINUED | OUTPATIENT
Start: 2023-11-19 | End: 2023-11-21 | Stop reason: HOSPADM

## 2023-11-19 RX ADMIN — IOHEXOL 100 ML: 350 INJECTION, SOLUTION INTRAVENOUS at 10:11

## 2023-11-19 RX ADMIN — CEFTRIAXONE 1 G: 1 INJECTION, POWDER, FOR SOLUTION INTRAMUSCULAR; INTRAVENOUS at 08:11

## 2023-11-19 RX ADMIN — CYANOCOBALAMIN TAB 250 MCG 1000 MCG: 250 TAB at 01:11

## 2023-11-19 RX ADMIN — HYDROCODONE BITARTRATE AND ACETAMINOPHEN 1 TABLET: 10; 325 TABLET ORAL at 10:11

## 2023-11-19 RX ADMIN — GUAIFENESIN 1200 MG: 600 TABLET, EXTENDED RELEASE ORAL at 08:11

## 2023-11-19 RX ADMIN — AZITHROMYCIN 500 MG: 500 INJECTION, POWDER, LYOPHILIZED, FOR SOLUTION INTRAVENOUS at 08:11

## 2023-11-19 RX ADMIN — FUROSEMIDE 40 MG: 10 INJECTION, SOLUTION INTRAVENOUS at 08:11

## 2023-11-19 RX ADMIN — Medication 1 PATCH: at 01:11

## 2023-11-19 RX ADMIN — FOLIC ACID 1 MG: 1 TABLET ORAL at 01:11

## 2023-11-19 RX ADMIN — POTASSIUM CHLORIDE 40 MEQ: 1500 TABLET, EXTENDED RELEASE ORAL at 09:11

## 2023-11-19 RX ADMIN — DOXYCYCLINE HYCLATE 100 MG: 100 TABLET ORAL at 01:11

## 2023-11-19 RX ADMIN — DOXYCYCLINE HYCLATE 100 MG: 100 TABLET ORAL at 08:11

## 2023-11-19 RX ADMIN — HYDRALAZINE HYDROCHLORIDE 25 MG: 25 TABLET, FILM COATED ORAL at 05:11

## 2023-11-19 RX ADMIN — GUAIFENESIN 1200 MG: 600 TABLET, EXTENDED RELEASE ORAL at 01:11

## 2023-11-19 RX ADMIN — POTASSIUM CHLORIDE 20 MEQ: 1500 TABLET, EXTENDED RELEASE ORAL at 08:11

## 2023-11-19 RX ADMIN — ATORVASTATIN CALCIUM 20 MG: 20 TABLET, FILM COATED ORAL at 01:11

## 2023-11-19 RX ADMIN — HYDROCODONE BITARTRATE AND ACETAMINOPHEN 1 TABLET: 10; 325 TABLET ORAL at 06:11

## 2023-11-19 RX ADMIN — NIFEDIPINE 30 MG: 30 TABLET, FILM COATED, EXTENDED RELEASE ORAL at 05:11

## 2023-11-19 RX ADMIN — LOSARTAN POTASSIUM 100 MG: 50 TABLET, FILM COATED ORAL at 08:11

## 2023-11-19 RX ADMIN — OXYCODONE HYDROCHLORIDE AND ACETAMINOPHEN 500 MG: 500 TABLET ORAL at 01:11

## 2023-11-19 RX ADMIN — HYDROCODONE BITARTRATE AND ACETAMINOPHEN 1 TABLET: 10; 325 TABLET ORAL at 01:11

## 2023-11-19 RX ADMIN — ENOXAPARIN SODIUM 40 MG: 40 INJECTION SUBCUTANEOUS at 05:11

## 2023-11-19 RX ADMIN — CHOLECALCIFEROL TAB 25 MCG (1000 UNIT) 1000 UNITS: 25 TAB at 01:11

## 2023-11-19 NOTE — ASSESSMENT & PLAN NOTE
Recent COVID infection  Possible CAP  Pulmonary edema  Cigarette nicotine dependence  Morbid obesity  Worsening SOB, productive cough, now with hypoxia in immunosuppressed pt.  Titrate supplemental oxygen for saturations > 90%, now on room air  COVID and flu negative 11/19.  Rocephin and doxycycline, holding further azithromycin ()  Respiratory culture pending  Continue albuterol, Mucinex, incentive spirometer, flutter valve  CTA chest negative for PE, possible edema versus infectious process  Continue IV Lasix  Echo 11/20 EF 60-65%, grade 2 diastolic dysfunction, CVP 15 mm Hg

## 2023-11-19 NOTE — ASSESSMENT & PLAN NOTE
-A1c 8.1 on 10/05/2023  -on metformin 500 b.i.d., hold for now.  -likely will need increase metformin on discharge.  -Accu-Cheks and SSI while inpatient

## 2023-11-19 NOTE — ASSESSMENT & PLAN NOTE
BMI 35.4.  Morbid obesity complicates all aspects of disease management from diagnostic modalities to treatment. Weight loss encouraged and health benefits explained to patient.

## 2023-11-19 NOTE — ED NOTES
Point Of Care ISTAT Troponin Result     Result Reference Range   POCT Troponin  0.01 0.00 - 0.08

## 2023-11-19 NOTE — ED PROVIDER NOTES
Encounter Date: 11/19/2023       History     Chief Complaint   Patient presents with    Shortness of Breath     Patient reports that he began with SOB 3 hours ago. States that SOB is worsened with exertion. States that he is having some chest congestion and a cough as well. Denies any CP.     HPI  61yo M with a hx of HTN, DM who presents with shortness of breath. Reports he has been getting worse since he got covid. He was diagnosed with covid on 11/9. Was outside of any window for treatment.  Reports significant worsening of sob in last 3 hrs.  Is having a productive cough with clear or brown mucous.  No blood. No fevers. No abdoinal pain or swelling. Does report tightness in his chest, nonradiating. Gets significantly out of breath just coughing or moving, especially while walking.  Coughing keeps him awake.  Does have to prop himself up to sleep (2 pillows).  Is currently on 3LNC - not typically on oxygen at home. + smoker    Review of patient's allergies indicates:  No Known Allergies  Past Medical History:   Diagnosis Date    C. difficile colitis 2013    hospitalized for 9 days    Diabetes mellitus     Diverticulitis 2014    required hospitalization    Hypertension     Pneumonia 2014     Past Surgical History:   Procedure Laterality Date    ABCESS DRAINAGE  03/2016    perineum     Family History   Problem Relation Age of Onset    Diabetes Father     Cancer Paternal Grandmother         lung (smoker)    Diabetes Paternal Grandfather     Diabetes Other     Heart disease Mother      Social History     Tobacco Use    Smoking status: Every Day     Current packs/day: 1.00     Average packs/day: 1 pack/day for 30.0 years (30.0 ttl pk-yrs)     Types: Cigarettes    Smokeless tobacco: Never    Tobacco comments:     30 years, has quit a few times in the past   Substance Use Topics    Alcohol use: Yes     Comment: soc     Review of Systems    Physical Exam     Initial Vitals [11/19/23 5353]   BP Pulse Resp Temp SpO2   (!)  233/103 74 20 97.6 °F (36.4 °C) (!) 93 %      MAP       --         Physical Exam    Nursing note and vitals reviewed.  Constitutional: He appears well-developed and well-nourished. No distress.   HENT:   Head: Normocephalic and atraumatic.   Nose: Nose normal.   Eyes: Conjunctivae and EOM are normal. Pupils are equal, round, and reactive to light.   Neck: Neck supple.   Normal range of motion.  Cardiovascular:  Normal rate, regular rhythm and normal heart sounds.     Exam reveals no gallop and no friction rub.       No murmur heard.  Pulmonary/Chest: Breath sounds normal. No respiratory distress. He has no wheezes. He has no rales.   Crackles at bases   Abdominal: Abdomen is soft. He exhibits no distension. There is no abdominal tenderness. There is no rebound and no guarding.   Musculoskeletal:         General: No tenderness or edema. Normal range of motion.      Cervical back: Normal range of motion and neck supple.     Neurological: He is alert and oriented to person, place, and time.   Skin: Capillary refill takes less than 2 seconds.   Psychiatric: He has a normal mood and affect.         ED Course   Procedures  Labs Reviewed   CBC W/ AUTO DIFFERENTIAL   COMPREHENSIVE METABOLIC PANEL   TROPONIN I   B-TYPE NATRIURETIC PEPTIDE   POCT TROPONIN   POCT TROPONIN          Imaging Results    None          Medications - No data to display  Medical Decision Making  62-year-old male who presents with worsening shortness of breath and a cough and generally feeling unwell.  He was diagnosed with COVID on 11/09.  Since then has been worsening.  Significantly worse in the last few hours.  Does report also some midline chest pain.  Based on history and exam, suspect either pneumonia, new viral process, or CHF/fluid overload.  Does not sound like flash pulmonary edema.  Could be hypertensive urgency or emergency however he typically lives this high.  Considered PE with recent COVID however other diagnoses are more likely.  We  will get labs and a chest x-ray as well as a bedside echo.  Suspect he will likely require admission but pending further workup    Amount and/or Complexity of Data Reviewed  Labs: ordered.  Radiology: ordered.  ECG/medicine tests: ordered and independent interpretation performed.     Details: Sinus, rate 70, pac's present, t wave inversion in avr, no other st changes              Attending Attestation:   Physician Attestation Statement for Resident:  As the supervising MD   Physician Attestation Statement: I have personally seen and examined this patient.   I agree with the above history.  -:   As the supervising MD I agree with the above PE.     As the supervising MD I agree with the above treatment, course, plan, and disposition.                                        Clinical Impression:  Final diagnoses:  [R06.02] SOB (shortness of breath)                 Mis Langford MD  11/19/23 0517
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
## 2023-11-19 NOTE — ED TRIAGE NOTES
Christiano Dahl, a 62 y.o. male presents to the ED w/ complaint of SOB 3 hours ago. Pt states that SOB is worsened with exertion. States that he is having some chest congestion and a cough as well. Denies any CP.     Triage note:  Chief Complaint   Patient presents with    Shortness of Breath     Patient reports that he began with SOB 3 hours ago. States that SOB is worsened with exertion. States that he is having some chest congestion and a cough as well. Denies any CP.     Review of patient's allergies indicates:  No Known Allergies  Past Medical History:   Diagnosis Date    C. difficile colitis 2013    hospitalized for 9 days    Diabetes mellitus     Diverticulitis 2014    required hospitalization    Hypertension     Pneumonia 2014   Patient identifiers for Christiano Dahl checked and correct.    LOC: The patient is awake, alert and aware of environment with an appropriate affect, the patient is oriented x 4 and speaking appropriately.    APPEARANCE: Patient resting comfortably and in no acute distress, patient is clean and well groomed, patient's clothing is properly fastened.    SKIN: The skin is warm and dry, color consistent with ethnicity, patient has normal skin turgor and moist mucus membranes, skin intact, no breakdown or bruising noted.    MUSCULOSKELETAL: Patient moving all extremities well, no obvious swelling or deformities noted.    CARDIAC: Patient has a normal rate and rhythm, no periphreal edema noted, capillary refill < 3 seconds.    ABDOMEN: Soft and non tender to palpation, no distention noted. Patient denies any nausea, vomiting, diarrhea, or constipation.     NEUROLOGIC: Eyes open spontaneously, PERRL, behavior appropriate to situation, follows commands, facial expression symmetrical, bilateral hand grasp equal and even, purposeful motor response noted, normal sensation in all extremities.     HEENT: No abnormalities noted. White sclera and pupils equal round and reactive to light. Denies  headache, dizziness.     : Pt voids independently, denies dysuria, hematuria, frequency.

## 2023-11-19 NOTE — ASSESSMENT & PLAN NOTE
Uncontrolled on losartan per pt and family  /103 admission with pulmonary edema.  IV Lasix administered, Home losartan started with improved BP.  Will add medications as appropriate.  Concern for hypertensive cardiomyopathy, echo pending.
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
## 2023-11-19 NOTE — ED NOTES
Patient identifiers verified and correct for Christiano Dahl.  LOC: The patient is awake, alert and aware of environment with an appropriate affect, the patient is oriented x 3 and speaking appropriately.   APPEARANCE: Patient appears comfortable and in no acute distress, patient is clean and well groomed.  SKIN: The skin is warm and dry, color consistent with ethnicity, patient has normal skin turgor and moist mucus membranes, skin intact, no breakdown or bruising noted to front side.   MUSCULOSKELETAL: Patient moving all extremities spontaneously.  RESPIRATORY: Airway is open and patent, respirations are spontaneous, patient has a normal effort and rate, no accessory muscle use noted, pt placed on continuous pulse ox with O2 sats noted at 97% on 2L NC. C/o cough.  CARDIAC: Pt placed on cardiac monitor. Patient has a normal rate and regular rhythm, no edema noted, capillary refill < 3 seconds. Hypertensive.  GASTRO: Soft and non tender to palpation, no distention noted, normoactive bowel sounds present in all four quadrants. Pt states bowel movements have been regular.  : Pt denies any pain or frequency with urination. Uses urinal. Urine is light yellow.  NEURO: Pt opens eyes spontaneously, behavior appropriate to situation, follows commands, facial expression symmetrical, bilateral hand grasp equal and even, purposeful motor response noted, normal sensation in all extremities when touched with a finger.    Wife at bedside.

## 2023-11-19 NOTE — ASSESSMENT & PLAN NOTE
Possible CHF  Concern for cardiomyopathy possibly related to COVID versus uncontrolled hypertension  Echo pending  Continue Lasix  Strict I/O, daily weights, cardiac/diabetic diet

## 2023-11-19 NOTE — PROVIDER PROGRESS NOTES - EMERGENCY DEPT.
Encounter Date: 11/19/2023    ED Physician Progress Notes          Physician Note:   Signout Note  I received signout from the previous providers.      Chief complaint:  Shortness of breath     Per sign out and chart review:  Christiano Dahl is a 62 y.o. male , presenting with complaints of difficulty breathing.     Pt signed out to me pending: Chest x-ray and laboratory workup     Update/ Disposition:  Chest x-ray was suspicious for pneumonia vs pulmonary edema.  Patient given 40 of Lasix as well as started on Rocephin/azithromycin for community-acquired pneumonia coverage.  Given the new oxygen requirements and findings of infection etiology, we will admit to hospital medicine for further medical management       Patient, caregiver and/or family understands the plan and verbalized agreement. All questions answered.      Diagnostic Impression:    Pneumonia vs CHF exacerbation

## 2023-11-19 NOTE — ED NOTES
Telemetry Verification   Patient placed on Telemetry Box  Verified with War Room  Box # [id number]   Monitor Tech Tammie   Rate 87   Rhythm NS

## 2023-11-19 NOTE — ASSESSMENT & PLAN NOTE
Recent COVID infection  Possible CAP  Pulmonary edema  Cigarette nicotine dependence  Morbid obesity  Titrate supplemental oxygen for saturations > 90%  COVID negative 11/19.  Rocephin and doxycycline, holding further azithromycin ()  Respiratory infection panel and respiratory culture pending  Continue albuterol, Mucinex, incentive spirometer, flutter valve  CTA chest ordered, rule out PE.  Continue IV Lasix, follow up echo

## 2023-11-19 NOTE — ASSESSMENT & PLAN NOTE
Dangers of cigarette smoking were reviewed with patient in detail.  Encourage cessation.  Discussion  > 3 minutes.  Nicotine patches ordered  Pt will benefit from pulmonology referral with formal PFTs on discharge.

## 2023-11-20 LAB
ALBUMIN SERPL BCP-MCNC: 2.7 G/DL (ref 3.5–5.2)
ALP SERPL-CCNC: 124 U/L (ref 55–135)
ALT SERPL W/O P-5'-P-CCNC: 12 U/L (ref 10–44)
ANION GAP SERPL CALC-SCNC: 11 MMOL/L (ref 8–16)
ASCENDING AORTA: 3.29 CM
AST SERPL-CCNC: 7 U/L (ref 10–40)
AV INDEX (PROSTH): 0.59
AV MEAN GRADIENT: 7 MMHG
AV PEAK GRADIENT: 14 MMHG
AV VALVE AREA BY VELOCITY RATIO: 2.7 CM²
AV VALVE AREA: 2.31 CM²
AV VELOCITY RATIO: 0.69
BASOPHILS # BLD AUTO: 0.06 K/UL (ref 0–0.2)
BASOPHILS NFR BLD: 0.4 % (ref 0–1.9)
BILIRUB SERPL-MCNC: 0.5 MG/DL (ref 0.1–1)
BUN SERPL-MCNC: 13 MG/DL (ref 8–23)
CALCIUM SERPL-MCNC: 8.8 MG/DL (ref 8.7–10.5)
CHLORIDE SERPL-SCNC: 104 MMOL/L (ref 95–110)
CO2 SERPL-SCNC: 28 MMOL/L (ref 23–29)
CREAT SERPL-MCNC: 0.9 MG/DL (ref 0.5–1.4)
CV ECHO LV RWT: 0.44 CM
DIFFERENTIAL METHOD: ABNORMAL
DOP CALC AO PEAK VEL: 1.85 M/S
DOP CALC AO VTI: 39.18 CM
DOP CALC LVOT AREA: 3.9 CM2
DOP CALC LVOT DIAMETER: 2.23 CM
DOP CALC LVOT PEAK VEL: 1.28 M/S
DOP CALC LVOT STROKE VOLUME: 90.45 CM3
DOP CALCLVOT PEAK VEL VTI: 23.17 CM
E WAVE DECELERATION TIME: 215.52 MSEC
E/A RATIO: 1.29
E/E' RATIO: 16.33 M/S
ECHO LV POSTERIOR WALL: 1.23 CM (ref 0.6–1.1)
EOSINOPHIL # BLD AUTO: 0.3 K/UL (ref 0–0.5)
EOSINOPHIL NFR BLD: 2.2 % (ref 0–8)
ERYTHROCYTE [DISTWIDTH] IN BLOOD BY AUTOMATED COUNT: 18 % (ref 11.5–14.5)
EST. GFR  (NO RACE VARIABLE): >60 ML/MIN/1.73 M^2
FERRITIN SERPL-MCNC: 316 NG/ML (ref 20–300)
FRACTIONAL SHORTENING: 37 % (ref 28–44)
GLUCOSE SERPL-MCNC: 139 MG/DL (ref 70–110)
HCT VFR BLD AUTO: 33.6 % (ref 40–54)
HGB BLD-MCNC: 10.3 G/DL (ref 14–18)
IMM GRANULOCYTES # BLD AUTO: 0.12 K/UL (ref 0–0.04)
IMM GRANULOCYTES NFR BLD AUTO: 0.8 % (ref 0–0.5)
INTERVENTRICULAR SEPTUM: 1.25 CM (ref 0.6–1.1)
IRON SERPL-MCNC: 22 UG/DL (ref 45–160)
LA MAJOR: 6.32 CM
LA MINOR: 6.97 CM
LA WIDTH: 4.44 CM
LEFT ATRIUM SIZE: 4.61 CM
LEFT ATRIUM VOLUME MOD: 61.31 CM3
LEFT ATRIUM VOLUME: 115.33 CM3
LEFT INTERNAL DIMENSION IN SYSTOLE: 3.48 CM (ref 2.1–4)
LEFT VENTRICLE DIASTOLIC VOLUME: 150.24 ML
LEFT VENTRICLE SYSTOLIC VOLUME: 50.3 ML
LEFT VENTRICULAR INTERNAL DIMENSION IN DIASTOLE: 5.55 CM (ref 3.5–6)
LEFT VENTRICULAR MASS: 289.16 G
LV LATERAL E/E' RATIO: 19.6 M/S
LV SEPTAL E/E' RATIO: 14 M/S
LYMPHOCYTES # BLD AUTO: 2.4 K/UL (ref 1–4.8)
LYMPHOCYTES NFR BLD: 15.6 % (ref 18–48)
MAGNESIUM SERPL-MCNC: 1.7 MG/DL (ref 1.6–2.6)
MCH RBC QN AUTO: 26.2 PG (ref 27–31)
MCHC RBC AUTO-ENTMCNC: 30.7 G/DL (ref 32–36)
MCV RBC AUTO: 86 FL (ref 82–98)
MONOCYTES # BLD AUTO: 1 K/UL (ref 0.3–1)
MONOCYTES NFR BLD: 6.6 % (ref 4–15)
MV PEAK A VEL: 0.76 M/S
MV PEAK E VEL: 0.98 M/S
NEUTROPHILS # BLD AUTO: 11.3 K/UL (ref 1.8–7.7)
NEUTROPHILS NFR BLD: 74.4 % (ref 38–73)
NRBC BLD-RTO: 0 /100 WBC
PHOSPHATE SERPL-MCNC: 3.6 MG/DL (ref 2.7–4.5)
PLATELET # BLD AUTO: 357 K/UL (ref 150–450)
PMV BLD AUTO: 9.7 FL (ref 9.2–12.9)
POCT GLUCOSE: 128 MG/DL (ref 70–110)
POCT GLUCOSE: 133 MG/DL (ref 70–110)
POCT GLUCOSE: 139 MG/DL (ref 70–110)
POCT GLUCOSE: 157 MG/DL (ref 70–110)
POTASSIUM SERPL-SCNC: 3.7 MMOL/L (ref 3.5–5.1)
PROCALCITONIN SERPL IA-MCNC: 0.02 NG/ML
PROT SERPL-MCNC: 7.8 G/DL (ref 6–8.4)
RA MAJOR: 5.84 CM
RA PRESSURE ESTIMATED: 15 MMHG
RA WIDTH: 3.88 CM
RBC # BLD AUTO: 3.93 M/UL (ref 4.6–6.2)
RIGHT VENTRICULAR END-DIASTOLIC DIMENSION: 4.47 CM
SATURATED IRON: 10 % (ref 20–50)
SINUS: 3.25 CM
SODIUM SERPL-SCNC: 143 MMOL/L (ref 136–145)
STJ: 2.58 CM
TDI LATERAL: 0.05 M/S
TDI SEPTAL: 0.07 M/S
TDI: 0.06 M/S
TOTAL IRON BINDING CAPACITY: 221 UG/DL (ref 250–450)
TRANSFERRIN SERPL-MCNC: 149 MG/DL (ref 200–375)
TRICUSPID ANNULAR PLANE SYSTOLIC EXCURSION: 2.18 CM
WBC # BLD AUTO: 15.22 K/UL (ref 3.9–12.7)

## 2023-11-20 PROCEDURE — 85025 COMPLETE CBC W/AUTO DIFF WBC: CPT | Performed by: FAMILY MEDICINE

## 2023-11-20 PROCEDURE — 25000003 PHARM REV CODE 250

## 2023-11-20 PROCEDURE — 83735 ASSAY OF MAGNESIUM: CPT | Performed by: FAMILY MEDICINE

## 2023-11-20 PROCEDURE — 84145 PROCALCITONIN (PCT): CPT | Performed by: FAMILY MEDICINE

## 2023-11-20 PROCEDURE — 25000003 PHARM REV CODE 250: Performed by: FAMILY MEDICINE

## 2023-11-20 PROCEDURE — 63600175 PHARM REV CODE 636 W HCPCS: Performed by: FAMILY MEDICINE

## 2023-11-20 PROCEDURE — 21400001 HC TELEMETRY ROOM

## 2023-11-20 PROCEDURE — 84466 ASSAY OF TRANSFERRIN: CPT | Performed by: FAMILY MEDICINE

## 2023-11-20 PROCEDURE — 27000646 HC AEROBIKA DEVICE

## 2023-11-20 PROCEDURE — 80053 COMPREHEN METABOLIC PANEL: CPT | Performed by: FAMILY MEDICINE

## 2023-11-20 PROCEDURE — 84100 ASSAY OF PHOSPHORUS: CPT | Performed by: FAMILY MEDICINE

## 2023-11-20 PROCEDURE — 94664 DEMO&/EVAL PT USE INHALER: CPT

## 2023-11-20 PROCEDURE — 27000207 HC ISOLATION

## 2023-11-20 PROCEDURE — 82728 ASSAY OF FERRITIN: CPT | Performed by: FAMILY MEDICINE

## 2023-11-20 PROCEDURE — 83540 ASSAY OF IRON: CPT | Performed by: FAMILY MEDICINE

## 2023-11-20 PROCEDURE — 94799 UNLISTED PULMONARY SVC/PX: CPT | Mod: XB

## 2023-11-20 PROCEDURE — 36415 COLL VENOUS BLD VENIPUNCTURE: CPT | Performed by: FAMILY MEDICINE

## 2023-11-20 PROCEDURE — S4991 NICOTINE PATCH NONLEGEND: HCPCS | Performed by: FAMILY MEDICINE

## 2023-11-20 RX ORDER — NIFEDIPINE 60 MG/1
60 TABLET, EXTENDED RELEASE ORAL DAILY
Status: DISCONTINUED | OUTPATIENT
Start: 2023-11-20 | End: 2023-11-20

## 2023-11-20 RX ORDER — NIFEDIPINE 30 MG/1
30 TABLET, EXTENDED RELEASE ORAL DAILY
Status: DISCONTINUED | OUTPATIENT
Start: 2023-11-20 | End: 2023-11-21 | Stop reason: HOSPADM

## 2023-11-20 RX ORDER — POTASSIUM CHLORIDE 20 MEQ/1
20 TABLET, EXTENDED RELEASE ORAL 2 TIMES DAILY
Status: COMPLETED | OUTPATIENT
Start: 2023-11-20 | End: 2023-11-20

## 2023-11-20 RX ADMIN — OXYCODONE HYDROCHLORIDE AND ACETAMINOPHEN 500 MG: 500 TABLET ORAL at 08:11

## 2023-11-20 RX ADMIN — HYDROCODONE BITARTRATE AND ACETAMINOPHEN 1 TABLET: 10; 325 TABLET ORAL at 08:11

## 2023-11-20 RX ADMIN — FUROSEMIDE 40 MG: 10 INJECTION, SOLUTION INTRAVENOUS at 09:11

## 2023-11-20 RX ADMIN — GUAIFENESIN 1200 MG: 600 TABLET, EXTENDED RELEASE ORAL at 08:11

## 2023-11-20 RX ADMIN — CHOLECALCIFEROL TAB 25 MCG (1000 UNIT) 1000 UNITS: 25 TAB at 08:11

## 2023-11-20 RX ADMIN — CEFTRIAXONE 1 G: 1 INJECTION, POWDER, FOR SOLUTION INTRAMUSCULAR; INTRAVENOUS at 08:11

## 2023-11-20 RX ADMIN — ATORVASTATIN CALCIUM 20 MG: 20 TABLET, FILM COATED ORAL at 08:11

## 2023-11-20 RX ADMIN — Medication 1 PATCH: at 08:11

## 2023-11-20 RX ADMIN — LOSARTAN POTASSIUM 100 MG: 50 TABLET, FILM COATED ORAL at 08:11

## 2023-11-20 RX ADMIN — FOLIC ACID 1 MG: 1 TABLET ORAL at 08:11

## 2023-11-20 RX ADMIN — ENOXAPARIN SODIUM 40 MG: 40 INJECTION SUBCUTANEOUS at 03:11

## 2023-11-20 RX ADMIN — HYDROCODONE BITARTRATE AND ACETAMINOPHEN 1 TABLET: 10; 325 TABLET ORAL at 12:11

## 2023-11-20 RX ADMIN — CYANOCOBALAMIN TAB 250 MCG 1000 MCG: 250 TAB at 08:11

## 2023-11-20 RX ADMIN — DOXYCYCLINE HYCLATE 100 MG: 100 TABLET ORAL at 09:11

## 2023-11-20 RX ADMIN — NIFEDIPINE 30 MG: 30 TABLET, FILM COATED, EXTENDED RELEASE ORAL at 08:11

## 2023-11-20 RX ADMIN — FUROSEMIDE 40 MG: 10 INJECTION, SOLUTION INTRAVENOUS at 08:11

## 2023-11-20 RX ADMIN — DOXYCYCLINE HYCLATE 100 MG: 100 TABLET ORAL at 08:11

## 2023-11-20 RX ADMIN — POTASSIUM CHLORIDE 20 MEQ: 1500 TABLET, EXTENDED RELEASE ORAL at 08:11

## 2023-11-20 NOTE — CONSULTS
"Food & Nutrition  Education    Diet Education: DM and Cardiac Diet Edu  Time Spent: 15 minutes  Learners: Caregiver, pt      Nutrition Education provided with handouts: CHO Counting for People with Diabetes, Meal Planning Using the Plate Method, Cardiac-TLC Nutrition Therapy      Comments: Spoke with consulting MD. Reports wanting diet education for DM Type 2 and HTN. Spoke with caregiver on phone around 10:40 AM (airborne, contact, and droplet isolation). Reports patient getting out shower but had concerns on food choices - does not prefer hospital food. RD called back around 11 AM and phone off hook. Unable to get in touch with patient. RD will follow up.       Follow-Up: Yes    Please Re-consult as needed        Thanks!  Renata Rubio ([place]), MS, RD, LDN      "

## 2023-11-20 NOTE — PLAN OF CARE
Admit from ED.     N: A/Ox4.   C: Denies CP/palpitations. Cardiac monitoring.   R: Denies SOB. Weaned to RA. Congested, productive cough. Awaiting sputum collection.   GI: Reports regular BM. Last reported BM 11/19. Rounded abdomen. Cardiac, 2000 calorie diet ordered. Patient's spouse bringing patient snacks/candy.   : Inability to measure strict output due to patient non-compliance with urinal despite repeated education of need for measurement. Denies urinary difficulty.   S: Intact.   M: Independent with BRP. OOB to chair.   Pain: Reports chronic low back pain. Norco PRN, throbbing.   Blood glucose monitoring. Nicotine patch to right arm.     Wife bedside.   Safety measures maintained. Hourly rounding complete, personal belongings/call light within reach. Patient able to make needs known.       Problem: Adult Inpatient Plan of Care  Goal: Plan of Care Review  11/19/2023 1855 by Reema Abdalla RN  Outcome: Ongoing, Progressing  11/19/2023 1510 by Reema Abdalla RN  Outcome: Ongoing, Progressing  Goal: Patient-Specific Goal (Individualized)  11/19/2023 1855 by Reema Abdalla RN  Outcome: Ongoing, Progressing  11/19/2023 1510 by Reema Abdalla RN  Outcome: Ongoing, Progressing  Goal: Absence of Hospital-Acquired Illness or Injury  11/19/2023 1855 by Reema Abdalla RN  Outcome: Ongoing, Progressing  11/19/2023 1510 by Reema Abdalla RN  Outcome: Ongoing, Progressing  Goal: Optimal Comfort and Wellbeing  11/19/2023 1855 by Reema Abdalla RN  Outcome: Ongoing, Progressing  11/19/2023 1510 by Reema Abdalla RN  Outcome: Ongoing, Progressing  Goal: Readiness for Transition of Care  11/19/2023 1855 by Reema Abdalla RN  Outcome: Ongoing, Progressing  11/19/2023 1510 by Reema Abdalla RN  Outcome: Ongoing, Progressing     Problem: Diabetes Comorbidity  Goal: Blood Glucose Level Within Targeted Range  11/19/2023 1855 by Reema Abdalla RN  Outcome: Ongoing, Progressing  11/19/2023 1510 by Rm  SERINA Benavidez  Outcome: Ongoing, Progressing     Problem: Impaired Wound Healing  Goal: Optimal Wound Healing  11/19/2023 1855 by Reema Abdalla RN  Outcome: Ongoing, Progressing  11/19/2023 1510 by eRema Abdalla RN  Outcome: Ongoing, Progressing     Problem: Fluid Imbalance (Pneumonia)  Goal: Fluid Balance  11/19/2023 1855 by Reema Abdalla RN  Outcome: Ongoing, Progressing  11/19/2023 1510 by Reema Abdalla RN  Outcome: Ongoing, Progressing     Problem: Infection (Pneumonia)  Goal: Resolution of Infection Signs and Symptoms  11/19/2023 1855 by Reema Abdalla RN  Outcome: Ongoing, Progressing  11/19/2023 1510 by Reema Abdalla RN  Outcome: Ongoing, Progressing     Problem: Respiratory Compromise (Pneumonia)  Goal: Effective Oxygenation and Ventilation  11/19/2023 1855 by Reema Abdalla RN  Outcome: Ongoing, Progressing  11/19/2023 1510 by Reema Abdalla RN  Outcome: Ongoing, Progressing     Problem: Adjustment to Illness (Heart Failure)  Goal: Optimal Coping  11/19/2023 1855 by Reema Abdalla RN  Outcome: Ongoing, Progressing  11/19/2023 1510 by Reema Abdalla RN  Outcome: Ongoing, Progressing     Problem: Cardiac Output Decreased (Heart Failure)  Goal: Optimal Cardiac Output  11/19/2023 1855 by Reema Abdalla RN  Outcome: Ongoing, Progressing  11/19/2023 1510 by Reema Abdalla RN  Outcome: Ongoing, Progressing     Problem: Dysrhythmia (Heart Failure)  Goal: Stable Heart Rate and Rhythm  11/19/2023 1855 by Reema Abdalla RN  Outcome: Ongoing, Progressing  11/19/2023 1510 by Reema Abdalla RN  Outcome: Ongoing, Progressing     Problem: Fluid Imbalance (Heart Failure)  Goal: Fluid Balance  11/19/2023 1855 by Reema Abadlla RN  Outcome: Ongoing, Progressing  11/19/2023 1510 by Reema Abdalla RN  Outcome: Ongoing, Progressing     Problem: Functional Ability Impaired (Heart Failure)  Goal: Optimal Functional Ability  11/19/2023 1855 by Reema Abdalla RN  Outcome: Ongoing,  Progressing  11/19/2023 1510 by Reema Abdalla RN  Outcome: Ongoing, Progressing     Problem: Oral Intake Inadequate (Heart Failure)  Goal: Optimal Nutrition Intake  11/19/2023 1855 by Reema Abdalla RN  Outcome: Ongoing, Progressing  11/19/2023 1510 by Reema bAdalla RN  Outcome: Ongoing, Progressing     Problem: Respiratory Compromise (Heart Failure)  Goal: Effective Oxygenation and Ventilation  11/19/2023 1855 by Reema Abdalla RN  Outcome: Ongoing, Progressing  11/19/2023 1510 by Reema Abdalla RN  Outcome: Ongoing, Progressing     Problem: Sleep Disordered Breathing (Heart Failure)  Goal: Effective Breathing Pattern During Sleep  11/19/2023 1855 by Reema Abdalla RN  Outcome: Ongoing, Progressing  11/19/2023 1510 by Reema Abdalla RN  Outcome: Ongoing, Progressing     Problem: Fall Injury Risk  Goal: Absence of Fall and Fall-Related Injury  11/19/2023 1855 by Reema Abdalla RN  Outcome: Ongoing, Progressing  11/19/2023 1510 by Reema Abdalla RN  Outcome: Ongoing, Progressing     Problem: Pain Chronic (Persistent)  Goal: Acceptable Pain Control and Functional Ability  11/19/2023 1855 by Reema Abdalla RN  Outcome: Ongoing, Progressing  11/19/2023 1510 by Reema Abdalla RN  Outcome: Ongoing, Progressing

## 2023-11-20 NOTE — PLAN OF CARE
problem: Fall Injury Risk  Goal: Absence of Fall and Fall-Related Injury  Outcome: Ongoing, Progressing         Problem: Pain Chronic (Persistent)  Goal: Acceptable Pain Control and Functional Ability  Outcome: Ongoing, Progressing   Assess the effectiveness of pulmonary hygiene and ability to perform airway clearance techniques. Promote early mobility or ambulation; match activity to ability and tolerance. Encourage deep breathing and lung expansion therapy to prevent atelectasis; adjust treatment to patients response. Anticipate the need to splint chest or abdominal wall with cough to minimize discomfort; assist if needed. Initiate cough-enhancement and airway-clearance techniques with instruction. Consider inhaled pharmacologic therapy (e.g., beta-2 agonist, mucolytic, corticosteroid, antimicrobial) to improve mucus clearance, inflammation, cough response and air flow

## 2023-11-20 NOTE — PLAN OF CARE
Problem: Adult Inpatient Plan of Care  Goal: Plan of Care Review  Outcome: Ongoing, Progressing  Flowsheets (Taken 11/20/2023 1035)  Plan of Care Reviewed With: patient  Goal: Patient-Specific Goal (Individualized)  Outcome: Ongoing, Progressing  Flowsheets (Taken 11/20/2023 1035)  Anxieties, Fears or Concerns: none  Individualized Care Needs: abx, safetly, monitor o2 needs  Goal: Absence of Hospital-Acquired Illness or Injury  Outcome: Ongoing, Progressing  Intervention: Identify and Manage Fall Risk  Flowsheets (Taken 11/20/2023 1035)  Safety Promotion/Fall Prevention:   assistive device/personal item within reach   side rails raised x 2   medications reviewed  Intervention: Prevent Skin Injury  Flowsheets (Taken 11/20/2023 1035)  Body Position: position changed independently  Skin Protection: adhesive use limited  Goal: Optimal Comfort and Wellbeing  Outcome: Ongoing, Progressing  Intervention: Monitor Pain and Promote Comfort  Flowsheets (Taken 11/20/2023 1035)  Pain Management Interventions:   pain management plan reviewed with patient/caregiver   care clustered

## 2023-11-20 NOTE — CARE UPDATE
Evaluation of Early Detection of Sepsis Risk     Patient Name: Christiano Dalh  MRN:  [id number]  Primary Care Team: [facility]  Date of Admission:  11/19/2023     Principal Problem:  Acute respiratory failure with hypoxia   Date of Review: 11/20/2023    Patient reviewed for elevated sepsis risk score. Sepsis Screen Positive  Will continue to monitor for signs and symptoms of new or worsening infection and screen as needed. Please notify Rapid Response Team for any hypotension or decompensation. Patient is currently covered with appropriate antibiotic therapy (ceftriaxone and doxycycline).     Sepsis Screen Results     IP Sepsis Screen (most recent)       Sepsis Screen (IP) - 11/20/23 1051       Is the patient's history or complaint suggestive of a possible infection? Yes  -GM    Are there at least two of the following signs and symptoms present? Yes  -GM    Sepsis signs/symptoms - Hyper or Hypothermia Hyperthermia >100.4 or Hypothermia < 96.8  -GM    Sepsis signs/symptoms - WBC WBC < 4,000 or WBC > 12,000  -GM    Are any of the following organ dysfunction criteria present and not considered to be due to a chronic condition? Yes  -GM    Organ Dysfunction Criteria - O2 O2 Saturation < 95% on room air  -    Initiate Sepsis Protocol No  -GM    Reason sepsis not considered Pt. receiving appropriate management  -              User Key  (r) = Recorded By, (t) = Taken By, (c) = Cosigned By      Initials Name    Kathy Gann PA-C Grace E. Monger, PA-C  [facility]

## 2023-11-21 ENCOUNTER — TELEPHONE (OUTPATIENT)
Dept: CARDIOLOGY | Facility: CLINIC | Age: 63
End: 2023-11-21
Payer: MEDICAID

## 2023-11-21 VITALS
WEIGHT: 223.75 LBS | RESPIRATION RATE: 19 BRPM | OXYGEN SATURATION: 94 % | TEMPERATURE: 98 F | DIASTOLIC BLOOD PRESSURE: 57 MMHG | HEART RATE: 67 BPM | HEIGHT: 67 IN | BODY MASS INDEX: 35.12 KG/M2 | SYSTOLIC BLOOD PRESSURE: 117 MMHG

## 2023-11-21 LAB
ALBUMIN SERPL BCP-MCNC: 2.5 G/DL (ref 3.5–5.2)
ALP SERPL-CCNC: 123 U/L (ref 55–135)
ALT SERPL W/O P-5'-P-CCNC: 12 U/L (ref 10–44)
ANION GAP SERPL CALC-SCNC: 10 MMOL/L (ref 8–16)
AST SERPL-CCNC: 8 U/L (ref 10–40)
BASOPHILS # BLD AUTO: 0.05 K/UL (ref 0–0.2)
BASOPHILS NFR BLD: 0.4 % (ref 0–1.9)
BILIRUB SERPL-MCNC: 0.7 MG/DL (ref 0.1–1)
BUN SERPL-MCNC: 13 MG/DL (ref 8–23)
CALCIUM SERPL-MCNC: 8.5 MG/DL (ref 8.7–10.5)
CHLORIDE SERPL-SCNC: 102 MMOL/L (ref 95–110)
CO2 SERPL-SCNC: 26 MMOL/L (ref 23–29)
CREAT SERPL-MCNC: 0.8 MG/DL (ref 0.5–1.4)
DIFFERENTIAL METHOD: ABNORMAL
EOSINOPHIL # BLD AUTO: 0.3 K/UL (ref 0–0.5)
EOSINOPHIL NFR BLD: 1.8 % (ref 0–8)
ERYTHROCYTE [DISTWIDTH] IN BLOOD BY AUTOMATED COUNT: 18.1 % (ref 11.5–14.5)
EST. GFR  (NO RACE VARIABLE): >60 ML/MIN/1.73 M^2
GLUCOSE SERPL-MCNC: 111 MG/DL (ref 70–110)
HCT VFR BLD AUTO: 32.6 % (ref 40–54)
HGB BLD-MCNC: 10.1 G/DL (ref 14–18)
IMM GRANULOCYTES # BLD AUTO: 0.11 K/UL (ref 0–0.04)
IMM GRANULOCYTES NFR BLD AUTO: 0.8 % (ref 0–0.5)
LYMPHOCYTES # BLD AUTO: 2.4 K/UL (ref 1–4.8)
LYMPHOCYTES NFR BLD: 17.1 % (ref 18–48)
MAGNESIUM SERPL-MCNC: 1.6 MG/DL (ref 1.6–2.6)
MCH RBC QN AUTO: 26.1 PG (ref 27–31)
MCHC RBC AUTO-ENTMCNC: 31 G/DL (ref 32–36)
MCV RBC AUTO: 84 FL (ref 82–98)
MONOCYTES # BLD AUTO: 1.1 K/UL (ref 0.3–1)
MONOCYTES NFR BLD: 8 % (ref 4–15)
NEUTROPHILS # BLD AUTO: 10.1 K/UL (ref 1.8–7.7)
NEUTROPHILS NFR BLD: 71.9 % (ref 38–73)
NRBC BLD-RTO: 0 /100 WBC
PHOSPHATE SERPL-MCNC: 3.9 MG/DL (ref 2.7–4.5)
PLATELET # BLD AUTO: 330 K/UL (ref 150–450)
PMV BLD AUTO: 9.4 FL (ref 9.2–12.9)
POCT GLUCOSE: 120 MG/DL (ref 70–110)
POCT GLUCOSE: 123 MG/DL (ref 70–110)
POTASSIUM SERPL-SCNC: 3.6 MMOL/L (ref 3.5–5.1)
PROT SERPL-MCNC: 7.3 G/DL (ref 6–8.4)
RBC # BLD AUTO: 3.87 M/UL (ref 4.6–6.2)
SODIUM SERPL-SCNC: 138 MMOL/L (ref 136–145)
WBC # BLD AUTO: 14.06 K/UL (ref 3.9–12.7)

## 2023-11-21 PROCEDURE — 63600175 PHARM REV CODE 636 W HCPCS: Performed by: FAMILY MEDICINE

## 2023-11-21 PROCEDURE — 84100 ASSAY OF PHOSPHORUS: CPT | Performed by: FAMILY MEDICINE

## 2023-11-21 PROCEDURE — 25000003 PHARM REV CODE 250

## 2023-11-21 PROCEDURE — S4991 NICOTINE PATCH NONLEGEND: HCPCS | Performed by: FAMILY MEDICINE

## 2023-11-21 PROCEDURE — 25000003 PHARM REV CODE 250: Performed by: FAMILY MEDICINE

## 2023-11-21 PROCEDURE — 85025 COMPLETE CBC W/AUTO DIFF WBC: CPT | Performed by: FAMILY MEDICINE

## 2023-11-21 PROCEDURE — 83735 ASSAY OF MAGNESIUM: CPT | Performed by: FAMILY MEDICINE

## 2023-11-21 PROCEDURE — 36415 COLL VENOUS BLD VENIPUNCTURE: CPT | Performed by: FAMILY MEDICINE

## 2023-11-21 PROCEDURE — 25000242 PHARM REV CODE 250 ALT 637 W/ HCPCS: Performed by: FAMILY MEDICINE

## 2023-11-21 PROCEDURE — 80053 COMPREHEN METABOLIC PANEL: CPT | Performed by: FAMILY MEDICINE

## 2023-11-21 RX ORDER — DOXYCYCLINE HYCLATE 100 MG
100 TABLET ORAL EVERY 12 HOURS
Qty: 5 TABLET | Refills: 0 | Status: SHIPPED | OUTPATIENT
Start: 2023-11-21 | End: 2023-11-24

## 2023-11-21 RX ORDER — METFORMIN HYDROCHLORIDE 500 MG/1
1000 TABLET, EXTENDED RELEASE ORAL EVERY MORNING
Qty: 180 TABLET | Refills: 0 | Status: SHIPPED | OUTPATIENT
Start: 2023-11-21 | End: 2024-02-19

## 2023-11-21 RX ORDER — GUAIFENESIN 600 MG/1
1200 TABLET, EXTENDED RELEASE ORAL 2 TIMES DAILY
Qty: 20 TABLET | Refills: 0 | Status: SHIPPED | OUTPATIENT
Start: 2023-11-21 | End: 2023-11-26

## 2023-11-21 RX ORDER — ATORVASTATIN CALCIUM 20 MG/1
20 TABLET, FILM COATED ORAL DAILY
Qty: 90 TABLET | Refills: 0 | Status: SHIPPED | OUTPATIENT
Start: 2023-11-21 | End: 2024-02-19

## 2023-11-21 RX ORDER — BACLOFEN 10 MG/1
10 TABLET ORAL 2 TIMES DAILY PRN
Start: 2023-11-21

## 2023-11-21 RX ORDER — CEFDINIR 300 MG/1
300 CAPSULE ORAL 2 TIMES DAILY
Qty: 4 CAPSULE | Refills: 0 | Status: SHIPPED | OUTPATIENT
Start: 2023-11-22 | End: 2023-11-24

## 2023-11-21 RX ORDER — IBUPROFEN 200 MG
1 TABLET ORAL DAILY
Qty: 90 PATCH | Refills: 0 | Status: SHIPPED | OUTPATIENT
Start: 2023-11-22

## 2023-11-21 RX ORDER — FLUTICASONE PROPIONATE 50 MCG
2 SPRAY, SUSPENSION (ML) NASAL DAILY
Qty: 16 G | Refills: 0 | Status: SHIPPED | OUTPATIENT
Start: 2023-11-22

## 2023-11-21 RX ORDER — HYDROCODONE BITARTRATE AND ACETAMINOPHEN 10; 325 MG/1; MG/1
1 TABLET ORAL EVERY 4 HOURS PRN
Qty: 42 TABLET | Refills: 0 | Status: SHIPPED | OUTPATIENT
Start: 2023-11-21 | End: 2023-11-28

## 2023-11-21 RX ORDER — FOLIC ACID 1 MG/1
1 TABLET ORAL DAILY
Qty: 30 TABLET | Refills: 0 | Status: SHIPPED | OUTPATIENT
Start: 2023-11-22 | End: 2023-12-22

## 2023-11-21 RX ORDER — ALBUTEROL SULFATE 90 UG/1
2 AEROSOL, METERED RESPIRATORY (INHALATION) EVERY 6 HOURS PRN
Qty: 18 G | Refills: 2 | Status: SHIPPED | OUTPATIENT
Start: 2023-11-21 | End: 2024-02-19

## 2023-11-21 RX ORDER — LOSARTAN POTASSIUM 100 MG/1
100 TABLET ORAL DAILY
Qty: 90 TABLET | Refills: 0 | Status: SHIPPED | OUTPATIENT
Start: 2023-11-21 | End: 2024-02-19

## 2023-11-21 RX ORDER — POTASSIUM CHLORIDE 20 MEQ/1
20 TABLET, EXTENDED RELEASE ORAL DAILY
Qty: 30 TABLET | Refills: 0 | Status: SHIPPED | OUTPATIENT
Start: 2023-11-21 | End: 2023-12-21

## 2023-11-21 RX ORDER — LANOLIN ALCOHOL/MO/W.PET/CERES
1000 CREAM (GRAM) TOPICAL DAILY
Qty: 30 TABLET | Refills: 0 | Status: SHIPPED | OUTPATIENT
Start: 2023-11-22 | End: 2023-12-22

## 2023-11-21 RX ORDER — ONDANSETRON 4 MG/1
4 TABLET, ORALLY DISINTEGRATING ORAL EVERY 8 HOURS PRN
Qty: 42 TABLET | Refills: 0 | Status: SHIPPED | OUTPATIENT
Start: 2023-11-21 | End: 2023-12-05

## 2023-11-21 RX ORDER — FUROSEMIDE 20 MG/1
TABLET ORAL
Qty: 74 TABLET | Refills: 0 | Status: SHIPPED | OUTPATIENT
Start: 2023-11-21

## 2023-11-21 RX ORDER — ASPIRIN 81 MG/1
81 TABLET ORAL DAILY
Qty: 90 TABLET | Refills: 0 | Status: SHIPPED | OUTPATIENT
Start: 2023-11-21 | End: 2024-11-20

## 2023-11-21 RX ORDER — NIFEDIPINE 30 MG/1
30 TABLET, EXTENDED RELEASE ORAL DAILY
Qty: 30 TABLET | Refills: 2 | Status: SHIPPED | OUTPATIENT
Start: 2023-11-22 | End: 2024-02-20

## 2023-11-21 RX ORDER — GABAPENTIN 800 MG/1
800 TABLET ORAL 3 TIMES DAILY
Qty: 90 TABLET | Refills: 0 | Status: SHIPPED | OUTPATIENT
Start: 2023-11-21 | End: 2023-12-21

## 2023-11-21 RX ADMIN — FOLIC ACID 1 MG: 1 TABLET ORAL at 07:11

## 2023-11-21 RX ADMIN — DOXYCYCLINE HYCLATE 100 MG: 100 TABLET ORAL at 07:11

## 2023-11-21 RX ADMIN — CHOLECALCIFEROL TAB 25 MCG (1000 UNIT) 1000 UNITS: 25 TAB at 07:11

## 2023-11-21 RX ADMIN — CEFTRIAXONE 1 G: 1 INJECTION, POWDER, FOR SOLUTION INTRAMUSCULAR; INTRAVENOUS at 08:11

## 2023-11-21 RX ADMIN — GUAIFENESIN 1200 MG: 600 TABLET, EXTENDED RELEASE ORAL at 07:11

## 2023-11-21 RX ADMIN — CYANOCOBALAMIN TAB 250 MCG 1000 MCG: 250 TAB at 07:11

## 2023-11-21 RX ADMIN — ATORVASTATIN CALCIUM 20 MG: 20 TABLET, FILM COATED ORAL at 07:11

## 2023-11-21 RX ADMIN — Medication 1 PATCH: at 07:11

## 2023-11-21 RX ADMIN — FUROSEMIDE 40 MG: 10 INJECTION, SOLUTION INTRAVENOUS at 07:11

## 2023-11-21 RX ADMIN — NIFEDIPINE 30 MG: 30 TABLET, FILM COATED, EXTENDED RELEASE ORAL at 07:11

## 2023-11-21 RX ADMIN — LOSARTAN POTASSIUM 100 MG: 50 TABLET, FILM COATED ORAL at 07:11

## 2023-11-21 RX ADMIN — HYDROCODONE BITARTRATE AND ACETAMINOPHEN 1 TABLET: 10; 325 TABLET ORAL at 08:11

## 2023-11-21 RX ADMIN — OXYCODONE HYDROCHLORIDE AND ACETAMINOPHEN 500 MG: 500 TABLET ORAL at 07:11

## 2023-11-21 RX ADMIN — FLUTICASONE PROPIONATE 100 MCG: 50 SPRAY, METERED NASAL at 07:11
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
## 2023-11-21 NOTE — ASSESSMENT & PLAN NOTE
Uncontrolled on losartan per pt and family  /103 admission with pulmonary edema.  IV Lasix administered, Home losartan started with improved BP.  Better control with nifedipine added

## 2023-11-21 NOTE — NURSING
Discharge order received. IV and tele removed. Discharge instructions discussed with patient and all questions answered. Patient ready for discharge.

## 2023-11-21 NOTE — SUBJECTIVE & OBJECTIVE
Interval History:  Pt feeling much better today.  Shortness of breaths improved and now on room air.  Cough has improved.  Denies any subjective fevers.    Labs reviewed:  WBC 15.22, sodium 143, potassium 3.7, creatinine 0.9    Review of Systems: As above  Objective:     Vital Signs (Most Recent):  Temp: 98.7 °F (37.1 °C) (11/20/23 1938)  Pulse: 78 (11/20/23 1938)  Resp: 18 (11/20/23 2052)  BP: 139/70 (11/20/23 1938)  SpO2: (!) 91 % (11/20/23 1938) Vital Signs (24h Range):  Temp:  [96.6 °F (35.9 °C)-98.8 °F (37.1 °C)] 98.7 °F (37.1 °C)  Pulse:  [66-78] 78  Resp:  [14-18] 18  SpO2:  [91 %-93 %] 91 %  BP: (126-187)/(61-82) 139/70     Weight: 101.5 kg (223 lb 12.3 oz)  Body mass index is 35.05 kg/m².  No intake or output data in the 24 hours ending 11/20/23 2056      Physical Exam  Vitals and nursing note reviewed.   Constitutional:       Appearance: He is well-developed.   Cardiovascular:      Rate and Rhythm: Normal rate and regular rhythm.   Pulmonary:      Effort: Pulmonary effort is normal.      Breath sounds: Normal breath sounds. No wheezing.   Abdominal:      Palpations: Abdomen is soft.      Tenderness: There is no abdominal tenderness. There is no guarding or rebound.   Musculoskeletal:         General: No tenderness.      Right lower leg: Edema present.      Left lower leg: Edema present.      Comments: Edema improving   Skin:     General: Skin is warm and dry.   Neurological:      Mental Status: He is alert and oriented to person, place, and time.   Psychiatric:         Behavior: Behavior normal.

## 2023-11-21 NOTE — DISCHARGE INSTRUCTIONS
Seek medical attention for worsening shortness of breath, lower extremity swelling, cough or developing other symptoms such as chest pain, palpitations, abdominal pain, nausea/vomiting.    Avoid tobacco products, alcohol, illicit substances.    Obtain a pulse oximeter. If oxygen saturation remains continuously less than 90%, seek medical attention.    Norco is a sedating medication so do not operate heavy machinery when using and try to avoid combining with other sedating meds.    2 L fluid restriction, 2 g sodium restriction, and check your weight daily.  Recommend 40 mg Lasix twice per day for 7 days followed by once per day.  If you notice 3 or more lb weight gain in 24 hours, or 5 or more lb weight gain in 1 week, take an additional 20 mg of Lasix. Contact PCP and discuss medication regimen if you find that you are needing additional Lasix.    Keep legs elevated when at rest. This will help prevent pooling of fluid in the legs.     Lasix can affect kidney function and electrolytes, especially can decrease potassium.  I sent a prescription for potassium to take daily.  Repeat labs have been ordered in 1 week and these should be followed up by PCP.    Blood pressure elevated throughout admission.  Added nifedipine and a prescription was provided.  Blood pressure was well-controlled throughout admission.  Check your blood pressure twice daily. Write down blood pressure in a journal. Bring this journal to primary care physician for further medication adjustments.    Prescription of Jardiance sent to the pharmacy, as discussed.  This medication helps decrease blood glucose and is also guideline recommended for congestive heart failure.  This is a medication that includes side-effects such as UTI and rare rash/infection in the perineal region. Discuss further with PCP and/or cardiology    Slightly anemic on labs.  Likely due to B12 and folate being on the low normal side in addition to iron-deficiency.  Also chronic  conditions such as diabetes and hypertension can contribute to anemia.  Prescription sent for B12 and folate supplementation.  Recommend following up with PCP regarding starting iron supplementation as well.  These labs should be followed up by primary care in a few weeks to see if continued supplementation is needed.    Cefdinir and doxycycline are antibiotics that were sent for possible pneumonia.  Continue these medications for 2 more days.  Zofran prescription sent to pharmacy for nausea that was experienced with medication administration in the Hospital.    Given smoking history, possible that you have developed COPD.  Albuterol prescription sent to use as needed for shortness of breath.  Recommend outpatient follow up with pulmonology for formal COPD testing.  Referral placed.    As discussed, aspirin has been added to medication list.  This medication is recommended in patients that have had previous heart attack or stroke to prevent future events. There is a risk of bleeding that goes along with taking aspirin, so it is not recommended for everyone. However, with multiple risk factors for heart disease, the benefit of preventing heart disease may outweigh the risk of bleeding. With high blood pressure, diabetes, smoking cigarettes, and elevated BMI, aspirin would be reasonable if you are ok with the bleeding risk. Discuss further with PCP and Cardiology.    BP was elevated at night and with snoring history, you may possibly have sleep apnea. Referral sent to sleep specialist.     Follow-up with PCP within 1-2 weeks. It is important to note that each of these medications that I have prescribed typically will not have refills. This is so your primary care physician or other specialists in the outpatient setting can review your progress and determine if these medications are to be continued. If they determine the medications need to be continued, make sure that you remind them about refills at your follow-up  appointments.    Referrals sent for pain management, pulmonology, Cardiology, and Podiatry

## 2023-11-21 NOTE — NURSING
Family member want him to be w/c down hallway and back if its possilbe Notified [facility] team Md made aware via secure chat.

## 2023-11-21 NOTE — PLAN OF CARE
[facility] - Med Surg  Initial Discharge Assessment       Primary Care Provider: Jaci Newton MD    Admission Diagnosis: SOB (shortness of breath) [R06.02]  Elevated brain natriuretic peptide (BNP) level [R79.89]    Admission Date: 11/19/2023  Expected Discharge Date: 11/21/2023    Transition of Care Barriers: (P) None    Payor: MEDICAID / Plan: [place] [employer] / Product Type: Managed Medicaid /     Extended Emergency Contact Information  Primary Emergency Contact: SAQIB SIMEON  Mobile Phone: [phone]  Relation: Significant other  Preferred language: English   needed? No    Discharge Plan A: (P) Home with family  Discharge Plan B: (P) Home      [id number] - [place], [place] - [address] AT [facility] [address]  [address]  [place] [address]  Phone: [phone] Fax: [phone]    Dr Fuentes [facility] [address] [facility]  [address] [facility] [address]  [place] [address]  Phone: [phone] Fax: [phone]    [id number] - [place], [place] - [address]  [address]  [place] [address]  Phone: [phone] Fax: [phone]      CM met with patient to discuss discharge planning. Patient lives home with spouse in a single story home with four stairs to enter. Prior to hospital admission, patient was independent with ADLs and required no DMEs.   Discharge Plan A and Plan B have been determined by review of patient's clinical status, future medical and therapeutic needs, and coverage/benefits for post-acute care in coordination with multidisciplinary team members.    Initial Assessment (most recent)       Adult Discharge Assessment - 11/21/23 1329          Discharge Assessment    Assessment Type Discharge Planning Assessment (P)      Confirmed/corrected address, phone number and insurance Yes (P)      Confirmed Demographics Correct on Facesheet (P)      Source of Information patient;family (P)       Communicated YOSELYN with patient/caregiver Yes (P)      Reason For Admission Acute respiratory failure with hypoxia (P)      People in Home spouse (P)      Do you expect to return to your current living situation? Yes (P)      Do you have help at home or someone to help you manage your care at home? Yes (P)      Who are your caregiver(s) and their phone number(s)? Keyur Alcazra (spouse) [phone] (P)      Prior to hospitilization cognitive status: Alert/Oriented (P)      Current cognitive status: Alert/Oriented (P)      Walking or Climbing Stairs -- (P)    IND    Dressing/Bathing -- (P)    IND    Home Accessibility stairs to enter home (P)      Number of Stairs, Main Entrance four (P)      Home Layout Able to live on 1st floor (P)      Equipment Currently Used at Home none (P)      Readmission within 30 days? Yes (P)      Patient currently being followed by outpatient case management? No (P)      Do you currently have service(s) that help you manage your care at home? No (P)      Do you have any problems affording any of your prescribed medications? No (P)      Is the patient taking medications as prescribed? yes (P)      Who is going to help you get home at discharge? Keyur Alcazar (spouse) [phone] (P)      How do you get to doctors appointments? car, drives self;family or friend will provide (P)      Are you on dialysis? No (P)      Do you take coumadin? No (P)      Discharge Plan discussed with: Spouse/sig other (P)      Transition of Care Barriers None (P)      Discharge Plan A Home with family (P)      Discharge Plan B Home (P)         Physical Activity    On average, how many minutes do you engage in exercise at this level? 0 min (P)         Financial Resource Strain    How hard is it for you to pay for the very basics like food, housing, medical care, and heating? Not hard at all (P)         Housing Stability    In the last 12 months, was there a time when you were not able to pay the mortgage  or rent on time? No (P)      In the last 12 months, how many places have you lived? 1 (P)      In the last 12 months, was there a time when you did not have a steady place to sleep or slept in a shelter (including now)? No (P)         Transportation Needs    In the past 12 months, has lack of transportation kept you from medical appointments or from getting medications? No (P)      In the past 12 months, has lack of transportation kept you from meetings, work, or from getting things needed for daily living? No (P)         Food Insecurity    Within the past 12 months, you worried that your food would run out before you got the money to buy more. Never true (P)      Within the past 12 months, the food you bought just didn't last and you didn't have money to get more. Never true (P)         Social Connections    In a typical week, how many times do you talk on the phone with family, friends, or neighbors? More than three times a week (P)      How often do you get together with friends or relatives? More than three times a week (P)      How often do you attend Jew or Muslim services? Never (P)      Do you belong to any clubs or organizations such as Jew groups, unions, fraternal or athletic groups, or school groups? No (P)      How often do you attend meetings of the clubs or organizations you belong to? Never (P)      Are you , , , , never , or living with a partner?  (P)         Alcohol Use    Q1: How often do you have a drink containing alcohol? Never (P)      Q2: How many drinks containing alcohol do you have on a typical day when you are drinking? Patient does not drink (P)      Q3: How often do you have six or more drinks on one occasion? Never (P)         OTHER    Name(s) of People in Home Keyur Alcazar (spouse) [phone] (P)                      Readmission Assessment (most recent)       Readmission Assessment - 11/21/23 1325          Readmission    Why  were you hospitalized in the last 30 days? COVID     Why were you readmitted? Alarmed about signs/symptoms     When you left the hospital how did you feel? Okay, not the best     When you left the hospital where did you go? Home with Family     Did patient/caregiver refused recommended DC plan? No     Tell me about what happened between when you left the hospital and the day you returned. began to feel bad     When did you start not feeling well? day before admission     Did you try to manage your symptoms your self? No     Did you call anyone? Yes     Who did you call? Other (comments)   [phone]    Did you try to see or did see a doctor or nurse before you came? No     Did you have  a follow-up appointment on discharge? Yes     Was this a planned readmission? Yes                        RUTHIE Malhotra  Case Management  [phone]

## 2023-11-21 NOTE — PLAN OF CARE
APPOINTMENT:    Patient Appointment(s) scheduled with Jaci Newton MD , on Tuesday Nov 28, 2023 hospital follow up at 8:00 am

## 2023-11-21 NOTE — NURSING
Home Oxygen Evaluation    Date Performed: 2023    1) Patient's Home O2 Sat on room air, while at rest: 95%        If O2 sats on room air at rest are 88% or below, patient qualifies. No additional testing needed. Document N/A in steps 2 and 3. If 89% or above, complete steps 2.      2) Patient's O2 Sat on room air while exercisin%        If O2 sats on room air while exercising remain 89% or above patient does not qualify, no further testing needed Document N/A in step 3. If O2 sats on room air while exercising are 88% or below, continue to step 3.      3) Patient's O2 Sat while exercising on O2: n/a          (Must show improvement from #2 for patients to qualify)    If O2 sats improve on oxygen, patient qualifies for portable oxygen. If not, the patient does not qualify.

## 2023-11-21 NOTE — ASSESSMENT & PLAN NOTE
HFpEF  Likely related to uncontrolled hypertension, improving  Echo EF 60-65%, grade 2 diastolic dysfunction  Continue IV Lasix  Strict I/O, daily weights, cardiac/diabetic diet

## 2023-11-21 NOTE — PLAN OF CARE
[facility] - Med Surg  Discharge Final Note    Primary Care Provider: Jaci Newton MD    Expected Discharge Date: 11/21/2023    Patient discharged to home. No needs. Follow appointment scheduled and placed in AVS.  Discharge Plan A and Plan B have been determined by review of patient's clinical status, future medical and therapeutic needs, and coverage/benefits for post-acute care in coordination with multidisciplinary team members.    Final Discharge Note (most recent)       Final Note - 11/21/23 1530          Final Note    Assessment Type Final Discharge Note (P)      Anticipated Discharge Disposition Home or Self Care (P)      Hospital Resources/Appts/Education Provided Appointments scheduled and added to AVS (P)         Post-Acute Status    Discharge Delays None known at this time (P)                      Important Message from Medicare             Contact Info       Jaci Nweton MD   Specialty: Internal Medicine   Relationship: PCP - General    [address] [facility] [address]   Phone: [phone]       Next Steps: Go on 11/28/2023    Instructions: hospital follow up at 8:00 am            RUTHIE Malhotra  Case Management  [phone]

## 2023-11-22 ENCOUNTER — TELEPHONE (OUTPATIENT)
Dept: CARDIOLOGY | Facility: CLINIC | Age: 63
End: 2023-11-22
Payer: MEDICAID

## 2023-11-23 NOTE — HOSPITAL COURSE
Pt admitted to hospital medicine service for acute hypoxic respiratory failure likely due to CAP in the setting of recent COVID infection and pulmonary edema in the setting of hypertensive emergency and new diagnosis of HFpEF with exacerbation.  COVID and flu swab negative.  Pt immunocompromised due to infliximab for hidradenitis suppurativa.  Started on Rocephin and doxycycline for CAP, cefdinir and doxy prescribed at discharge to complete course.  Started on IV Lasix for pulmonary edema, transitioned to oral Lasix on discharge.  Oxygen requirements improved and eventually pt breathing comfortably on room air.  On 6 minute walk, O2 saturations did not drop and pt did not require home oxygen.  Echo EF 60-65% with grade 2 diastolic dysfunction and CVP of 15 mm Hg.  Sent prescription of Jardiance for CHF with diabetes.  For hypertensive emergency, pt started on home losartan and nifedipine added with significant improvement.  Prescription provided for nifedipine.  Pt with approximately 30 pack-year smoking history and encouraged to quit.  Nicotine patches prescribed and recommend outpatient pulmonology follow up with formal PFTs.  Pt also with chronic lumbar compression fractures resulting in significant lower back pain, short course of Weir sent to pharmacy on discharge.    Pt and wife inquired about aspirin as primary prevention.  Discussed possible bleeding risks and potential benefits.  Given multiple risk factors including uncontrolled hypertension and diabetes, cigarette smoking, and elevated BMI, pt feels that the potential benefits would outweigh the risks in his specific situation.  Aspirin 81 mg daily is reasonable.  Sent to pharmacy.  Pt should discuss further with PCP and Cardiology.    The patient's chronic medical conditions were managed appropriately. Discharge plan of care was discussed at length with patient including patient's need for close outpatient follow-up. Medication counseling also took  place with the patient being educated on potential side effects/adverse events of medications. Patient also counseled about avoiding driving, operating machinery, or participating in any activities that may pose harm to self or others if they are taking medications that cause drowsiness or altered mental status. Patient also counseled to take medicines as prescribed and do not drink alcohol, use tobacco products, or use illicit substances. Patient counseled to return to the hospital or seek medical care if baseline status should suddenly change, return of symptoms occurs, or for any new or concerning symptoms that arise. Patient was in agreement with plan of care going forward and was then discharged.
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
## 2023-11-24 ENCOUNTER — HOSPITAL ENCOUNTER (INPATIENT)
Facility: HOSPITAL | Age: 63
LOS: 5 days | Discharge: LEFT BEFORE DISCHARGE | DRG: 189 | End: 2023-11-29
Attending: EMERGENCY MEDICINE | Admitting: INTERNAL MEDICINE
Payer: MEDICAID

## 2023-11-24 DIAGNOSIS — J18.9 COMMUNITY ACQUIRED PNEUMONIA, UNSPECIFIED LATERALITY: ICD-10-CM

## 2023-11-24 DIAGNOSIS — I50.9 HEART FAILURE, UNSPECIFIED HF CHRONICITY, UNSPECIFIED HEART FAILURE TYPE: ICD-10-CM

## 2023-11-24 DIAGNOSIS — E87.29 RESPIRATORY ACIDOSIS: ICD-10-CM

## 2023-11-24 DIAGNOSIS — J96.02 ACUTE RESPIRATORY FAILURE WITH HYPERCAPNIA: Primary | ICD-10-CM

## 2023-11-24 DIAGNOSIS — R53.83 LETHARGIC: ICD-10-CM
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
## 2023-11-24 PROBLEM — I50.33 ACUTE ON CHRONIC DIASTOLIC HEART FAILURE: Status: ACTIVE | Noted: 2023-11-24

## 2023-11-24 PROBLEM — U07.1 COVID: Status: ACTIVE | Noted: 2023-11-24

## 2023-11-24 PROBLEM — U07.1 COVID: Status: RESOLVED | Noted: 2023-11-24 | Resolved: 2023-11-24

## 2023-11-24 LAB
ALBUMIN SERPL BCP-MCNC: 2.8 G/DL (ref 3.5–5.2)
ALLENS TEST: ABNORMAL
ALLENS TEST: ABNORMAL
ALP SERPL-CCNC: 130 U/L (ref 55–135)
ALT SERPL W/O P-5'-P-CCNC: 19 U/L (ref 10–44)
ANION GAP SERPL CALC-SCNC: 12 MMOL/L (ref 8–16)
AST SERPL-CCNC: 15 U/L (ref 10–40)
BASOPHILS # BLD AUTO: 0.07 K/UL (ref 0–0.2)
BASOPHILS # BLD AUTO: 0.09 K/UL (ref 0–0.2)
BASOPHILS NFR BLD: 0.4 % (ref 0–1.9)
BASOPHILS NFR BLD: 0.5 % (ref 0–1.9)
BILIRUB SERPL-MCNC: 0.4 MG/DL (ref 0.1–1)
BNP SERPL-MCNC: 123 PG/ML (ref 0–99)
BUN SERPL-MCNC: 35 MG/DL (ref 8–23)
CALCIUM SERPL-MCNC: 9.1 MG/DL (ref 8.7–10.5)
CHLORIDE SERPL-SCNC: 104 MMOL/L (ref 95–110)
CK SERPL-CCNC: 181 U/L (ref 20–200)
CO2 SERPL-SCNC: 26 MMOL/L (ref 23–29)
CREAT SERPL-MCNC: 1.3 MG/DL (ref 0.5–1.4)
DELSYS: ABNORMAL
DIFFERENTIAL METHOD: ABNORMAL
DIFFERENTIAL METHOD: ABNORMAL
EOSINOPHIL # BLD AUTO: 0.2 K/UL (ref 0–0.5)
EOSINOPHIL # BLD AUTO: 0.2 K/UL (ref 0–0.5)
EOSINOPHIL NFR BLD: 0.9 % (ref 0–8)
EOSINOPHIL NFR BLD: 1 % (ref 0–8)
EP: 5
ERYTHROCYTE [DISTWIDTH] IN BLOOD BY AUTOMATED COUNT: 18 % (ref 11.5–14.5)
ERYTHROCYTE [DISTWIDTH] IN BLOOD BY AUTOMATED COUNT: 18.2 % (ref 11.5–14.5)
ERYTHROCYTE [SEDIMENTATION RATE] IN BLOOD BY WESTERGREN METHOD: 18 MM/H
EST. GFR  (NO RACE VARIABLE): >60 ML/MIN/1.73 M^2
FIO2: 40
GLUCOSE SERPL-MCNC: 120 MG/DL (ref 70–110)
HCO3 UR-SCNC: 30.8 MMOL/L (ref 24–28)
HCO3 UR-SCNC: 31.1 MMOL/L (ref 24–28)
HCT VFR BLD AUTO: 30.2 % (ref 40–54)
HCT VFR BLD AUTO: 33.5 % (ref 40–54)
HGB BLD-MCNC: 10.1 G/DL (ref 14–18)
HGB BLD-MCNC: 9.3 G/DL (ref 14–18)
IMM GRANULOCYTES # BLD AUTO: 0.15 K/UL (ref 0–0.04)
IMM GRANULOCYTES # BLD AUTO: 0.24 K/UL (ref 0–0.04)
IMM GRANULOCYTES NFR BLD AUTO: 0.9 % (ref 0–0.5)
IMM GRANULOCYTES NFR BLD AUTO: 1.4 % (ref 0–0.5)
IP: 12
LACTATE SERPL-SCNC: 0.7 MMOL/L (ref 0.5–2.2)
LYMPHOCYTES # BLD AUTO: 3.4 K/UL (ref 1–4.8)
LYMPHOCYTES # BLD AUTO: 3.9 K/UL (ref 1–4.8)
LYMPHOCYTES NFR BLD: 20.5 % (ref 18–48)
LYMPHOCYTES NFR BLD: 21.9 % (ref 18–48)
MCH RBC QN AUTO: 25.6 PG (ref 27–31)
MCH RBC QN AUTO: 26.3 PG (ref 27–31)
MCHC RBC AUTO-ENTMCNC: 30.1 G/DL (ref 32–36)
MCHC RBC AUTO-ENTMCNC: 30.8 G/DL (ref 32–36)
MCV RBC AUTO: 85 FL (ref 82–98)
MCV RBC AUTO: 85 FL (ref 82–98)
MODE: ABNORMAL
MONOCYTES # BLD AUTO: 1.5 K/UL (ref 0.3–1)
MONOCYTES # BLD AUTO: 1.7 K/UL (ref 0.3–1)
MONOCYTES NFR BLD: 8.9 % (ref 4–15)
MONOCYTES NFR BLD: 9.4 % (ref 4–15)
NEUTROPHILS # BLD AUTO: 11.2 K/UL (ref 1.8–7.7)
NEUTROPHILS # BLD AUTO: 11.6 K/UL (ref 1.8–7.7)
NEUTROPHILS NFR BLD: 65.8 % (ref 38–73)
NEUTROPHILS NFR BLD: 68.4 % (ref 38–73)
NRBC BLD-RTO: 0 /100 WBC
NRBC BLD-RTO: 0 /100 WBC
PCO2 BLDA: 60.4 MMHG (ref 35–45)
PCO2 BLDA: 67.8 MMHG (ref 35–45)
PH SMN: 7.27 [PH] (ref 7.35–7.45)
PH SMN: 7.32 [PH] (ref 7.35–7.45)
PLATELET # BLD AUTO: 351 K/UL (ref 150–450)
PLATELET # BLD AUTO: 381 K/UL (ref 150–450)
PMV BLD AUTO: 9.4 FL (ref 9.2–12.9)
PMV BLD AUTO: 9.7 FL (ref 9.2–12.9)
PO2 BLDA: 35 MMHG (ref 40–60)
PO2 BLDA: 41 MMHG (ref 40–60)
POC BE: 4 MMOL/L
POC BE: 5 MMOL/L
POC SATURATED O2: 61 % (ref 95–100)
POC SATURATED O2: 67 % (ref 95–100)
POC TCO2: 33 MMOL/L (ref 24–29)
POC TCO2: 33 MMOL/L (ref 24–29)
POCT GLUCOSE: 123 MG/DL (ref 70–110)
POTASSIUM SERPL-SCNC: 4.3 MMOL/L (ref 3.5–5.1)
PROT SERPL-MCNC: 8.2 G/DL (ref 6–8.4)
RBC # BLD AUTO: 3.54 M/UL (ref 4.6–6.2)
RBC # BLD AUTO: 3.95 M/UL (ref 4.6–6.2)
SAMPLE: ABNORMAL
SAMPLE: ABNORMAL
SITE: ABNORMAL
SITE: ABNORMAL
SODIUM SERPL-SCNC: 142 MMOL/L (ref 136–145)
SP02: 100
SPONT RATE: 20
TROPONIN I SERPL DL<=0.01 NG/ML-MCNC: 0.03 NG/ML (ref 0–0.03)
TROPONIN I SERPL DL<=0.01 NG/ML-MCNC: 0.03 NG/ML (ref 0–0.03)
WBC # BLD AUTO: 16.36 K/UL (ref 3.9–12.7)
WBC # BLD AUTO: 17.56 K/UL (ref 3.9–12.7)

## 2023-11-24 PROCEDURE — 27000207 HC ISOLATION

## 2023-11-24 PROCEDURE — 94761 N-INVAS EAR/PLS OXIMETRY MLT: CPT | Mod: XB

## 2023-11-24 PROCEDURE — 25000003 PHARM REV CODE 250: Performed by: EMERGENCY MEDICINE

## 2023-11-24 PROCEDURE — 99285 EMERGENCY DEPT VISIT HI MDM: CPT | Mod: 25

## 2023-11-24 PROCEDURE — 93010 EKG 12-LEAD: ICD-10-PCS | Mod: ,,, | Performed by: INTERNAL MEDICINE

## 2023-11-24 PROCEDURE — 93010 ELECTROCARDIOGRAM REPORT: CPT | Mod: ,,, | Performed by: INTERNAL MEDICINE

## 2023-11-24 PROCEDURE — 96367 TX/PROPH/DG ADDL SEQ IV INF: CPT

## 2023-11-24 PROCEDURE — 94660 CPAP INITIATION&MGMT: CPT

## 2023-11-24 PROCEDURE — 87040 BLOOD CULTURE FOR BACTERIA: CPT | Mod: 59

## 2023-11-24 PROCEDURE — 96375 TX/PRO/DX INJ NEW DRUG ADDON: CPT

## 2023-11-24 PROCEDURE — 63600175 PHARM REV CODE 636 W HCPCS

## 2023-11-24 PROCEDURE — 82803 BLOOD GASES ANY COMBINATION: CPT

## 2023-11-24 PROCEDURE — 85025 COMPLETE CBC W/AUTO DIFF WBC: CPT | Mod: 91

## 2023-11-24 PROCEDURE — 85025 COMPLETE CBC W/AUTO DIFF WBC: CPT

## 2023-11-24 PROCEDURE — 63600175 PHARM REV CODE 636 W HCPCS: Performed by: EMERGENCY MEDICINE

## 2023-11-24 PROCEDURE — 96365 THER/PROPH/DIAG IV INF INIT: CPT

## 2023-11-24 PROCEDURE — 99900035 HC TECH TIME PER 15 MIN (STAT)

## 2023-11-24 PROCEDURE — 84484 ASSAY OF TROPONIN QUANT: CPT | Mod: 91

## 2023-11-24 PROCEDURE — 27100171 HC OXYGEN HIGH FLOW UP TO 24 HOURS

## 2023-11-24 PROCEDURE — 82550 ASSAY OF CK (CPK): CPT

## 2023-11-24 PROCEDURE — 93005 ELECTROCARDIOGRAM TRACING: CPT

## 2023-11-24 PROCEDURE — 84484 ASSAY OF TROPONIN QUANT: CPT

## 2023-11-24 PROCEDURE — 83880 ASSAY OF NATRIURETIC PEPTIDE: CPT

## 2023-11-24 PROCEDURE — 27000190 HC CPAP FULL FACE MASK W/VALVE

## 2023-11-24 PROCEDURE — 80053 COMPREHEN METABOLIC PANEL: CPT

## 2023-11-24 PROCEDURE — 83605 ASSAY OF LACTIC ACID: CPT

## 2023-11-24 PROCEDURE — 20000000 HC ICU ROOM

## 2023-11-24 RX ORDER — GLUCAGON 1 MG
1 KIT INJECTION
Status: DISCONTINUED | OUTPATIENT
Start: 2023-11-24 | End: 2023-11-29 | Stop reason: HOSPADM

## 2023-11-24 RX ORDER — IBUPROFEN 200 MG
1 TABLET ORAL DAILY
Status: DISCONTINUED | OUTPATIENT
Start: 2023-11-25 | End: 2023-11-29 | Stop reason: HOSPADM

## 2023-11-24 RX ORDER — IBUPROFEN 200 MG
16 TABLET ORAL
Status: DISCONTINUED | OUTPATIENT
Start: 2023-11-24 | End: 2023-11-29 | Stop reason: HOSPADM

## 2023-11-24 RX ORDER — BACLOFEN 10 MG/1
10 TABLET ORAL 2 TIMES DAILY PRN
Status: DISCONTINUED | OUTPATIENT
Start: 2023-11-24 | End: 2023-11-24

## 2023-11-24 RX ORDER — INSULIN ASPART 100 [IU]/ML
0-5 INJECTION, SOLUTION INTRAVENOUS; SUBCUTANEOUS
Status: DISCONTINUED | OUTPATIENT
Start: 2023-11-24 | End: 2023-11-29 | Stop reason: HOSPADM

## 2023-11-24 RX ORDER — ASPIRIN 81 MG/1
81 TABLET ORAL DAILY
Status: DISCONTINUED | OUTPATIENT
Start: 2023-11-25 | End: 2023-11-29 | Stop reason: HOSPADM

## 2023-11-24 RX ORDER — FUROSEMIDE 10 MG/ML
40 INJECTION INTRAMUSCULAR; INTRAVENOUS
Status: COMPLETED | OUTPATIENT
Start: 2023-11-24 | End: 2023-11-24

## 2023-11-24 RX ORDER — ATORVASTATIN CALCIUM 20 MG/1
20 TABLET, FILM COATED ORAL DAILY
Status: DISCONTINUED | OUTPATIENT
Start: 2023-11-25 | End: 2023-11-29 | Stop reason: HOSPADM

## 2023-11-24 RX ORDER — ENOXAPARIN SODIUM 100 MG/ML
40 INJECTION SUBCUTANEOUS EVERY 24 HOURS
Status: DISCONTINUED | OUTPATIENT
Start: 2023-11-24 | End: 2023-11-29 | Stop reason: HOSPADM

## 2023-11-24 RX ORDER — IBUPROFEN 200 MG
24 TABLET ORAL
Status: DISCONTINUED | OUTPATIENT
Start: 2023-11-24 | End: 2023-11-29 | Stop reason: HOSPADM

## 2023-11-24 RX ORDER — SODIUM CHLORIDE 0.9 % (FLUSH) 0.9 %
10 SYRINGE (ML) INJECTION
Status: DISCONTINUED | OUTPATIENT
Start: 2023-11-24 | End: 2023-11-29 | Stop reason: HOSPADM

## 2023-11-24 RX ORDER — FOLIC ACID 1 MG/1
1 TABLET ORAL DAILY
Status: DISCONTINUED | OUTPATIENT
Start: 2023-11-25 | End: 2023-11-29 | Stop reason: HOSPADM

## 2023-11-24 RX ORDER — FLUTICASONE PROPIONATE 50 MCG
2 SPRAY, SUSPENSION (ML) NASAL DAILY
Status: DISCONTINUED | OUTPATIENT
Start: 2023-11-25 | End: 2023-11-29 | Stop reason: HOSPADM

## 2023-11-24 RX ORDER — FUROSEMIDE 10 MG/ML
40 INJECTION INTRAMUSCULAR; INTRAVENOUS 2 TIMES DAILY
Status: DISCONTINUED | OUTPATIENT
Start: 2023-11-25 | End: 2023-11-25

## 2023-11-24 RX ADMIN — VANCOMYCIN HYDROCHLORIDE 2000 MG: 5 INJECTION, POWDER, LYOPHILIZED, FOR SOLUTION INTRAVENOUS at 08:11

## 2023-11-24 RX ADMIN — PIPERACILLIN SODIUM AND TAZOBACTAM SODIUM 4.5 G: 4; .5 INJECTION, POWDER, FOR SOLUTION INTRAVENOUS at 07:11

## 2023-11-24 RX ADMIN — ENOXAPARIN SODIUM 40 MG: 40 INJECTION SUBCUTANEOUS at 10:11

## 2023-11-24 RX ADMIN — FUROSEMIDE 40 MG: 10 INJECTION, SOLUTION INTRAVENOUS at 08:11

## 2023-11-25 LAB
ADENOVIRUS: NOT DETECTED
ALBUMIN SERPL BCP-MCNC: 2.5 G/DL (ref 3.5–5.2)
ALLENS TEST: ABNORMAL
ALP SERPL-CCNC: 120 U/L (ref 55–135)
ALT SERPL W/O P-5'-P-CCNC: 17 U/L (ref 10–44)
AMPHET+METHAMPHET UR QL: NEGATIVE
ANION GAP SERPL CALC-SCNC: 16 MMOL/L (ref 8–16)
AST SERPL-CCNC: 15 U/L (ref 10–40)
BARBITURATES UR QL SCN>200 NG/ML: NEGATIVE
BENZODIAZ UR QL SCN>200 NG/ML: NEGATIVE
BILIRUB SERPL-MCNC: 0.5 MG/DL (ref 0.1–1)
BORDETELLA PARAPERTUSSIS (IS1001): NOT DETECTED
BORDETELLA PERTUSSIS (PTXP): NOT DETECTED
BUN SERPL-MCNC: 32 MG/DL (ref 8–23)
BZE UR QL SCN: NEGATIVE
CALCIUM SERPL-MCNC: 8.4 MG/DL (ref 8.7–10.5)
CANNABINOIDS UR QL SCN: NEGATIVE
CHLAMYDIA PNEUMONIAE: NOT DETECTED
CHLORIDE SERPL-SCNC: 103 MMOL/L (ref 95–110)
CO2 SERPL-SCNC: 20 MMOL/L (ref 23–29)
CORONAVIRUS 229E, COMMON COLD VIRUS: NOT DETECTED
CORONAVIRUS HKU1, COMMON COLD VIRUS: NOT DETECTED
CORONAVIRUS NL63, COMMON COLD VIRUS: NOT DETECTED
CORONAVIRUS OC43, COMMON COLD VIRUS: NOT DETECTED
CREAT SERPL-MCNC: 1.4 MG/DL (ref 0.5–1.4)
CREAT UR-MCNC: 54 MG/DL (ref 23–375)
DELSYS: ABNORMAL
EP: 6
ERYTHROCYTE [DISTWIDTH] IN BLOOD BY AUTOMATED COUNT: 17.9 % (ref 11.5–14.5)
ERYTHROCYTE [SEDIMENTATION RATE] IN BLOOD BY WESTERGREN METHOD: 20 MM/H
EST. GFR  (NO RACE VARIABLE): 56.5 ML/MIN/1.73 M^2
ETHANOL UR-MCNC: <10 MG/DL
FIO2: 25
FLUBV RNA NPH QL NAA+NON-PROBE: NOT DETECTED
GLUCOSE SERPL-MCNC: 109 MG/DL (ref 70–110)
HCO3 UR-SCNC: 28.6 MMOL/L (ref 24–28)
HCT VFR BLD AUTO: 32.5 % (ref 40–54)
HGB BLD-MCNC: 9.9 G/DL (ref 14–18)
HPIV1 RNA NPH QL NAA+NON-PROBE: NOT DETECTED
HPIV2 RNA NPH QL NAA+NON-PROBE: NOT DETECTED
HPIV3 RNA NPH QL NAA+NON-PROBE: NOT DETECTED
HPIV4 RNA NPH QL NAA+NON-PROBE: NOT DETECTED
HUMAN METAPNEUMOVIRUS: NOT DETECTED
INFLUENZA A (SUBTYPES H1,H1-2009,H3): NOT DETECTED
IP: 14
MAGNESIUM SERPL-MCNC: 1.6 MG/DL (ref 1.6–2.6)
MCH RBC QN AUTO: 25.6 PG (ref 27–31)
MCHC RBC AUTO-ENTMCNC: 30.5 G/DL (ref 32–36)
MCV RBC AUTO: 84 FL (ref 82–98)
METHADONE UR QL SCN>300 NG/ML: NEGATIVE
MODE: ABNORMAL
MYCOPLASMA PNEUMONIAE: NOT DETECTED
OPIATES UR QL SCN: ABNORMAL
PCO2 BLDA: 52.1 MMHG (ref 35–45)
PCP UR QL SCN>25 NG/ML: NEGATIVE
PH SMN: 7.35 [PH] (ref 7.35–7.45)
PHOSPHATE SERPL-MCNC: 5 MG/DL (ref 2.7–4.5)
PLATELET # BLD AUTO: 354 K/UL (ref 150–450)
PMV BLD AUTO: 9 FL (ref 9.2–12.9)
PO2 BLDA: 80 MMHG (ref 80–100)
POC BE: 3 MMOL/L
POC SATURATED O2: 95 % (ref 95–100)
POC TCO2: 30 MMOL/L (ref 23–27)
POTASSIUM SERPL-SCNC: 4.1 MMOL/L (ref 3.5–5.1)
PROT SERPL-MCNC: 7.5 G/DL (ref 6–8.4)
RBC # BLD AUTO: 3.87 M/UL (ref 4.6–6.2)
RESPIRATORY INFECTION PANEL SOURCE: NORMAL
RSV RNA NPH QL NAA+NON-PROBE: NOT DETECTED
RV+EV RNA NPH QL NAA+NON-PROBE: NOT DETECTED
SAMPLE: ABNORMAL
SARS-COV-2 RNA RESP QL NAA+PROBE: NOT DETECTED
SITE: ABNORMAL
SODIUM SERPL-SCNC: 139 MMOL/L (ref 136–145)
SP02: 91
TOXICOLOGY INFORMATION: ABNORMAL
VANCOMYCIN TROUGH SERPL-MCNC: 19.8 UG/ML (ref 10–22)
WBC # BLD AUTO: 14.57 K/UL (ref 3.9–12.7)

## 2023-11-25 PROCEDURE — 63600175 PHARM REV CODE 636 W HCPCS

## 2023-11-25 PROCEDURE — 36600 WITHDRAWAL OF ARTERIAL BLOOD: CPT

## 2023-11-25 PROCEDURE — 25000003 PHARM REV CODE 250: Performed by: STUDENT IN AN ORGANIZED HEALTH CARE EDUCATION/TRAINING PROGRAM

## 2023-11-25 PROCEDURE — 63700000 PHARM REV CODE 250 ALT 637 W/O HCPCS

## 2023-11-25 PROCEDURE — 25000003 PHARM REV CODE 250

## 2023-11-25 PROCEDURE — 99900035 HC TECH TIME PER 15 MIN (STAT)

## 2023-11-25 PROCEDURE — 80053 COMPREHEN METABOLIC PANEL: CPT

## 2023-11-25 PROCEDURE — 25000242 PHARM REV CODE 250 ALT 637 W/ HCPCS

## 2023-11-25 PROCEDURE — 27000221 HC OXYGEN, UP TO 24 HOURS

## 2023-11-25 PROCEDURE — 99291 CRITICAL CARE FIRST HOUR: CPT | Mod: ,,,

## 2023-11-25 PROCEDURE — 87798 DETECT AGENT NOS DNA AMP: CPT

## 2023-11-25 PROCEDURE — C1751 CATH, INF, PER/CENT/MIDLINE: HCPCS

## 2023-11-25 PROCEDURE — 76937 US GUIDE VASCULAR ACCESS: CPT

## 2023-11-25 PROCEDURE — 85027 COMPLETE CBC AUTOMATED: CPT

## 2023-11-25 PROCEDURE — 20600001 HC STEP DOWN PRIVATE ROOM

## 2023-11-25 PROCEDURE — 83735 ASSAY OF MAGNESIUM: CPT

## 2023-11-25 PROCEDURE — 84100 ASSAY OF PHOSPHORUS: CPT

## 2023-11-25 PROCEDURE — 36410 VNPNXR 3YR/> PHY/QHP DX/THER: CPT

## 2023-11-25 PROCEDURE — 80202 ASSAY OF VANCOMYCIN: CPT | Performed by: INTERNAL MEDICINE

## 2023-11-25 PROCEDURE — 80307 DRUG TEST PRSMV CHEM ANLYZR: CPT

## 2023-11-25 PROCEDURE — 87081 CULTURE SCREEN ONLY: CPT

## 2023-11-25 PROCEDURE — 94761 N-INVAS EAR/PLS OXIMETRY MLT: CPT | Mod: XB

## 2023-11-25 PROCEDURE — S4991 NICOTINE PATCH NONLEGEND: HCPCS

## 2023-11-25 PROCEDURE — 99291 PR CRITICAL CARE, E/M 30-74 MINUTES: ICD-10-PCS | Mod: ,,,

## 2023-11-25 PROCEDURE — 82803 BLOOD GASES ANY COMBINATION: CPT

## 2023-11-25 RX ORDER — NALOXONE HCL 0.4 MG/ML
0.2 VIAL (ML) INJECTION ONCE
Status: DISCONTINUED | OUTPATIENT
Start: 2023-11-25 | End: 2023-11-29 | Stop reason: HOSPADM

## 2023-11-25 RX ORDER — ONDANSETRON 2 MG/ML
4 INJECTION INTRAMUSCULAR; INTRAVENOUS ONCE
Status: COMPLETED | OUTPATIENT
Start: 2023-11-25 | End: 2023-11-25

## 2023-11-25 RX ORDER — FUROSEMIDE 10 MG/ML
120 INJECTION INTRAMUSCULAR; INTRAVENOUS ONCE
Status: COMPLETED | OUTPATIENT
Start: 2023-11-25 | End: 2023-11-25

## 2023-11-25 RX ORDER — ONDANSETRON HYDROCHLORIDE 4 MG/5ML
4 SOLUTION ORAL ONCE
Status: DISCONTINUED | OUTPATIENT
Start: 2023-11-25 | End: 2023-11-25

## 2023-11-25 RX ORDER — ONDANSETRON 2 MG/ML
INJECTION INTRAMUSCULAR; INTRAVENOUS
Status: COMPLETED
Start: 2023-11-25 | End: 2023-11-25

## 2023-11-25 RX ORDER — NIFEDIPINE 30 MG/1
30 TABLET, EXTENDED RELEASE ORAL DAILY
Status: DISCONTINUED | OUTPATIENT
Start: 2023-11-26 | End: 2023-11-29 | Stop reason: HOSPADM

## 2023-11-25 RX ORDER — BACLOFEN 5 MG/1
5 TABLET ORAL 3 TIMES DAILY PRN
Status: DISCONTINUED | OUTPATIENT
Start: 2023-11-25 | End: 2023-11-29 | Stop reason: HOSPADM

## 2023-11-25 RX ORDER — FUROSEMIDE 10 MG/ML
40 INJECTION INTRAMUSCULAR; INTRAVENOUS DAILY
Status: DISCONTINUED | OUTPATIENT
Start: 2023-11-26 | End: 2023-11-29 | Stop reason: HOSPADM

## 2023-11-25 RX ORDER — NALOXONE HCL 0.4 MG/ML
0.2 VIAL (ML) INJECTION ONCE
Status: DISCONTINUED | OUTPATIENT
Start: 2023-11-25 | End: 2023-11-25

## 2023-11-25 RX ORDER — HYDROCODONE BITARTRATE AND ACETAMINOPHEN 10; 325 MG/1; MG/1
1 TABLET ORAL EVERY 6 HOURS PRN
Status: DISCONTINUED | OUTPATIENT
Start: 2023-11-25 | End: 2023-11-29 | Stop reason: HOSPADM

## 2023-11-25 RX ORDER — AZITHROMYCIN 250 MG/1
500 TABLET, FILM COATED ORAL DAILY
Status: COMPLETED | OUTPATIENT
Start: 2023-11-25 | End: 2023-11-27

## 2023-11-25 RX ORDER — GABAPENTIN 100 MG/1
100 CAPSULE ORAL 3 TIMES DAILY
Status: DISCONTINUED | OUTPATIENT
Start: 2023-11-26 | End: 2023-11-29 | Stop reason: HOSPADM

## 2023-11-25 RX ORDER — ONDANSETRON 4 MG/1
4 TABLET, ORALLY DISINTEGRATING ORAL EVERY 8 HOURS PRN
Status: DISCONTINUED | OUTPATIENT
Start: 2023-11-26 | End: 2023-11-29 | Stop reason: HOSPADM

## 2023-11-25 RX ORDER — NALOXONE HCL 0.4 MG/ML
1 VIAL (ML) INJECTION ONCE
Status: COMPLETED | OUTPATIENT
Start: 2023-11-25 | End: 2023-11-25

## 2023-11-25 RX ORDER — LANOLIN ALCOHOL/MO/W.PET/CERES
800 CREAM (GRAM) TOPICAL ONCE
Status: COMPLETED | OUTPATIENT
Start: 2023-11-25 | End: 2023-11-25

## 2023-11-25 RX ORDER — NALOXONE HCL 0.4 MG/ML
0.4 VIAL (ML) INJECTION
Status: DISCONTINUED | OUTPATIENT
Start: 2023-11-25 | End: 2023-11-29 | Stop reason: HOSPADM

## 2023-11-25 RX ADMIN — FLUTICASONE PROPIONATE 100 MCG: 50 SPRAY, METERED NASAL at 09:11

## 2023-11-25 RX ADMIN — Medication 1 PATCH: at 09:11

## 2023-11-25 RX ADMIN — FUROSEMIDE 40 MG: 10 INJECTION, SOLUTION INTRAVENOUS at 09:11

## 2023-11-25 RX ADMIN — ASPIRIN 81 MG: 81 TABLET, COATED ORAL at 09:11

## 2023-11-25 RX ADMIN — VANCOMYCIN HYDROCHLORIDE 1250 MG: 1.25 INJECTION, POWDER, LYOPHILIZED, FOR SOLUTION INTRAVENOUS at 06:11

## 2023-11-25 RX ADMIN — PIPERACILLIN SODIUM AND TAZOBACTAM SODIUM 4.5 G: 4; .5 INJECTION, POWDER, FOR SOLUTION INTRAVENOUS at 09:11

## 2023-11-25 RX ADMIN — ONDANSETRON 4 MG: 2 INJECTION INTRAMUSCULAR; INTRAVENOUS at 02:11

## 2023-11-25 RX ADMIN — Medication 800 MG: at 09:11

## 2023-11-25 RX ADMIN — PIPERACILLIN SODIUM AND TAZOBACTAM SODIUM 4.5 G: 4; .5 INJECTION, POWDER, FOR SOLUTION INTRAVENOUS at 01:11

## 2023-11-25 RX ADMIN — PIPERACILLIN SODIUM AND TAZOBACTAM SODIUM 4.5 G: 4; .5 INJECTION, POWDER, FOR SOLUTION INTRAVENOUS at 05:11

## 2023-11-25 RX ADMIN — HYDROCODONE BITARTRATE AND ACETAMINOPHEN 1 TABLET: 10; 325 TABLET ORAL at 11:11

## 2023-11-25 RX ADMIN — NALXONE HYDROCHLORIDE 0.4 MG: 0.4 INJECTION INTRAMUSCULAR; INTRAVENOUS; SUBCUTANEOUS at 01:11

## 2023-11-25 RX ADMIN — NALXONE HYDROCHLORIDE 0.4 MG: 0.4 INJECTION INTRAMUSCULAR; INTRAVENOUS; SUBCUTANEOUS at 04:11

## 2023-11-25 RX ADMIN — ENOXAPARIN SODIUM 40 MG: 40 INJECTION SUBCUTANEOUS at 05:11

## 2023-11-25 RX ADMIN — NALXONE HYDROCHLORIDE 1 MG: 0.4 INJECTION INTRAMUSCULAR; INTRAVENOUS; SUBCUTANEOUS at 02:11

## 2023-11-25 RX ADMIN — AZITHROMYCIN 500 MG: 250 TABLET, FILM COATED ORAL at 12:11

## 2023-11-25 RX ADMIN — FOLIC ACID 1 MG: 1 TABLET ORAL at 09:11

## 2023-11-25 RX ADMIN — FUROSEMIDE 120 MG: 10 INJECTION, SOLUTION INTRAVENOUS at 01:11

## 2023-11-25 RX ADMIN — ATORVASTATIN CALCIUM 20 MG: 20 TABLET, FILM COATED ORAL at 09:11

## 2023-11-25 RX ADMIN — NALXONE HYDROCHLORIDE 0.2 MG: 0.4 INJECTION INTRAMUSCULAR; INTRAVENOUS; SUBCUTANEOUS at 01:11

## 2023-11-25 RX ADMIN — BACLOFEN 5 MG: 5 TABLET ORAL at 11:11
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
## 2023-11-25 NOTE — ASSESSMENT & PLAN NOTE
Patient with acute kidney injury likely.  Which is currently worsening. Labs reviewed- Renal function/electrolytes with Estimated Creatinine Clearance: 66.1 mL/min (based on SCr of 1.3 mg/dL). according to latest data.     --Monitor urine output and serial BMP and adjust therapy as needed  --Avoid nephrotoxins and renally dose meds for GFR listed above  --Strict I/O, may need Mendoza with aggressive diuresis  --Renal US

## 2023-11-25 NOTE — PROGRESS NOTES
"Pharmacokinetic Initial Assessment & Plan: IV Vancomycin    Intravenous vancomycin 2000 mg x once given in the ED on 11/24 @ 2020  Then Vancomycin 1250 mg every 12 hours  Obtain a Vancomycin trough 30 mins prior to the 4 th dose on 11/26 @ 0600  Desired empiric serum trough concentration is 15 to 20 mcg/mL    Pharmacy will continue to follow and monitor vancomycin.    X [phone] with any questions regarding this assessment.     Thank you for the consult,   Liliana Ag       Patient brief summary:  Christiano Dahl is a 62 y.o. male initiated on antimicrobial therapy with IV Vancomycin for treatment of suspected sepsis    Drug Allergies:   Review of patient's allergies indicates:  No Known Allergies    Actual Body Weight:   101.5 kg    Renal Function:   Estimated Creatinine Clearance: 66.1 mL/min (based on SCr of 1.3 mg/dL).,     CBC (last 72 hours):  Recent Labs   Lab Result Units 11/24/23  1826   WBC K/uL 17.56*   Hemoglobin g/dL 10.1*   Hematocrit % 33.5*   Platelets K/uL 381   Gran % % 65.8   Lymph % % 21.9   Mono % % 9.4   Eosinophil % % 1.0   Basophil % % 0.5   Differential Method  Automated       Metabolic Panel (last 72 hours):  Recent Labs   Lab Result Units 11/24/23  1826   Sodium mmol/L 142   Potassium mmol/L 4.3   Chloride mmol/L 104   CO2 mmol/L 26   Glucose mg/dL 120*   BUN mg/dL 35*   Creatinine mg/dL 1.3   Albumin g/dL 2.8*   Total Bilirubin mg/dL 0.4   Alkaline Phosphatase U/L 130   AST U/L 15   ALT U/L 19       Drug levels (last 3 results):  No results for input(s): "VANCOMYCINRA", "VANCORANDOM", "VANCOMYCINPE", "VANCOPEAK", "VANCOMYCINTR", "VANCOTROUGH" in the last 72 hours.    Microbiologic Results:  Microbiology Results (last 7 days)       Procedure Component Value Units Date/Time    Blood culture #1 [[id number]] Collected: 11/24/23 1828    Order Status: Sent Specimen: Blood from Peripheral, Hand, Left Updated: 11/24/23 1846    Blood culture #2 [[id number]] Collected: 11/24/23 1827    Order " Status: Sent Specimen: Blood from Peripheral, Hand, Right Updated: 11/24/23 3900
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
## 2023-11-25 NOTE — PROGRESS NOTES
"Pharmacokinetic Assessment: IV Vancomycin  Progress Note      Assessment/Plan:  - Initial vanc plan adjusted for renal function  - ELVIS on admit, up to 1.4 this morning. Baseline Scr ~0.8 mg/dL.  - Scheduled regimen DC'd until level obtained to ensure clearance.  - Goal trough 10-20 mcg/mL  - Collect trough on 11/25 at 1730. If level appropriate, can consider rescheduling regimen.      Drug levels (last 3 results):  No results for input(s): "VANCOMYCINRA", "VANCOMYCINPE", "VANCOMYCINTR", "VANCOTROUGH" in the last 72 hours.    Pharmacy will continue to follow and monitor vancomycin.    Please contact pharmacy at extension [phone] for questions regarding this assessment.      Evette Monsalve, PharmD, BCCCP  Neurocritical Care Clinical Pharmacist  Ext. [phone]          Patient brief summary:  Christiano Dahl is a 62 y.o. male initiated on antimicrobial therapy with IV Vancomycin for treatment of sepsis      Drug Allergies:   Review of patient's allergies indicates:  No Known Allergies      Renal Function:   Estimated Creatinine Clearance: 61.3 mL/min (based on SCr of 1.4 mg/dL).,     Dialysis Method (if applicable):  N/A      "
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
## 2023-11-25 NOTE — CONSULTS
Consult received. Patient to be admitted to the MICU. Full H&P to follow.    Tamica Syed NP  Critical Care Medicine  11/24/2023   9:35 PM

## 2023-11-25 NOTE — CARE UPDATE
RAPID RESPONSE VASCULAR ACCESS NOTE       Single lumen 18G, 10CM midline placed in the left basilic vein. Needle advanced into the vessel under real time ultrasound guidance.    Max dwell date: 12/24/2023   Lot number: [id number]

## 2023-11-25 NOTE — ASSESSMENT & PLAN NOTE
Patient with Hypercapnic Respiratory failure which is Acute on chronic.  he is not on home oxygen. Supplemental oxygen was provided and noted- Oxygen Concentration (%):  [40] 40    Signs/symptoms of respiratory failure include- increased work of breathing and lethargy. Contributing diagnoses includes - CHF and Pleural effusion Labs and images were reviewed. Patient Has recent ABG, which has been reviewed. 7.3/60.4/35/31.1 Will treat underlying causes and adjust management of respiratory failure as follows-     -BIPAP 16/5 25%, Will continue to wean overnight  -Recent admission 11/19-11/21 for Hypoxic respiratory failure 2/2 CAP/ COVID  -WBC to 17 in the ED  -Continue broad spectrum abx with Vanc zosyn  -F/u flu/covid/RSV  -Diuresis for goal net negative  -Will need outpatient sleep on discharge
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
## 2023-11-26 LAB
ALBUMIN SERPL BCP-MCNC: 2.4 G/DL (ref 3.5–5.2)
ALP SERPL-CCNC: 101 U/L (ref 55–135)
ALT SERPL W/O P-5'-P-CCNC: 26 U/L (ref 10–44)
ANION GAP SERPL CALC-SCNC: 11 MMOL/L (ref 8–16)
AST SERPL-CCNC: 18 U/L (ref 10–40)
BILIRUB SERPL-MCNC: 0.5 MG/DL (ref 0.1–1)
BUN SERPL-MCNC: 18 MG/DL (ref 8–23)
CALCIUM SERPL-MCNC: 8.6 MG/DL (ref 8.7–10.5)
CHLORIDE SERPL-SCNC: 99 MMOL/L (ref 95–110)
CO2 SERPL-SCNC: 31 MMOL/L (ref 23–29)
CREAT SERPL-MCNC: 1 MG/DL (ref 0.5–1.4)
ERYTHROCYTE [DISTWIDTH] IN BLOOD BY AUTOMATED COUNT: 17.3 % (ref 11.5–14.5)
EST. GFR  (NO RACE VARIABLE): >60 ML/MIN/1.73 M^2
GLUCOSE SERPL-MCNC: 126 MG/DL (ref 70–110)
GLUCOSE SERPL-MCNC: 150 MG/DL (ref 70–110)
HCT VFR BLD AUTO: 31.9 % (ref 40–54)
HGB BLD-MCNC: 9.6 G/DL (ref 14–18)
MAGNESIUM SERPL-MCNC: 1.8 MG/DL (ref 1.6–2.6)
MCH RBC QN AUTO: 25.3 PG (ref 27–31)
MCHC RBC AUTO-ENTMCNC: 30.1 G/DL (ref 32–36)
MCV RBC AUTO: 84 FL (ref 82–98)
PHOSPHATE SERPL-MCNC: 3.5 MG/DL (ref 2.7–4.5)
PLATELET # BLD AUTO: 344 K/UL (ref 150–450)
PMV BLD AUTO: 9.4 FL (ref 9.2–12.9)
POCT GLUCOSE: 133 MG/DL (ref 70–110)
POCT GLUCOSE: 142 MG/DL (ref 70–110)
POCT GLUCOSE: 188 MG/DL (ref 70–110)
POTASSIUM SERPL-SCNC: 3.3 MMOL/L (ref 3.5–5.1)
PROT SERPL-MCNC: 7.4 G/DL (ref 6–8.4)
RBC # BLD AUTO: 3.79 M/UL (ref 4.6–6.2)
SODIUM SERPL-SCNC: 141 MMOL/L (ref 136–145)
VANCOMYCIN SERPL-MCNC: 10 UG/ML
WBC # BLD AUTO: 12.74 K/UL (ref 3.9–12.7)

## 2023-11-26 PROCEDURE — 27100171 HC OXYGEN HIGH FLOW UP TO 24 HOURS

## 2023-11-26 PROCEDURE — 99232 SBSQ HOSP IP/OBS MODERATE 35: CPT | Mod: ,,, | Performed by: INTERNAL MEDICINE

## 2023-11-26 PROCEDURE — 63600175 PHARM REV CODE 636 W HCPCS: Performed by: INTERNAL MEDICINE

## 2023-11-26 PROCEDURE — 25000003 PHARM REV CODE 250

## 2023-11-26 PROCEDURE — 85027 COMPLETE CBC AUTOMATED: CPT

## 2023-11-26 PROCEDURE — 63600175 PHARM REV CODE 636 W HCPCS

## 2023-11-26 PROCEDURE — 20600001 HC STEP DOWN PRIVATE ROOM

## 2023-11-26 PROCEDURE — 25000003 PHARM REV CODE 250: Performed by: INTERNAL MEDICINE

## 2023-11-26 PROCEDURE — 83735 ASSAY OF MAGNESIUM: CPT

## 2023-11-26 PROCEDURE — 84100 ASSAY OF PHOSPHORUS: CPT

## 2023-11-26 PROCEDURE — 25000003 PHARM REV CODE 250: Performed by: STUDENT IN AN ORGANIZED HEALTH CARE EDUCATION/TRAINING PROGRAM

## 2023-11-26 PROCEDURE — 99900035 HC TECH TIME PER 15 MIN (STAT)

## 2023-11-26 PROCEDURE — S4991 NICOTINE PATCH NONLEGEND: HCPCS

## 2023-11-26 PROCEDURE — 80053 COMPREHEN METABOLIC PANEL: CPT

## 2023-11-26 PROCEDURE — 94761 N-INVAS EAR/PLS OXIMETRY MLT: CPT

## 2023-11-26 PROCEDURE — 99232 PR SUBSEQUENT HOSPITAL CARE,LEVL II: ICD-10-PCS | Mod: ,,, | Performed by: INTERNAL MEDICINE

## 2023-11-26 PROCEDURE — 80202 ASSAY OF VANCOMYCIN: CPT | Mod: 91 | Performed by: INTERNAL MEDICINE

## 2023-11-26 PROCEDURE — 36415 COLL VENOUS BLD VENIPUNCTURE: CPT | Performed by: INTERNAL MEDICINE

## 2023-11-26 PROCEDURE — 63700000 PHARM REV CODE 250 ALT 637 W/O HCPCS

## 2023-11-26 PROCEDURE — 94660 CPAP INITIATION&MGMT: CPT

## 2023-11-26 RX ADMIN — ATORVASTATIN CALCIUM 20 MG: 20 TABLET, FILM COATED ORAL at 09:11

## 2023-11-26 RX ADMIN — FLUTICASONE PROPIONATE 100 MCG: 50 SPRAY, METERED NASAL at 09:11

## 2023-11-26 RX ADMIN — Medication 1 PATCH: at 09:11

## 2023-11-26 RX ADMIN — HYDROCODONE BITARTRATE AND ACETAMINOPHEN 1 TABLET: 10; 325 TABLET ORAL at 10:11

## 2023-11-26 RX ADMIN — PIPERACILLIN SODIUM AND TAZOBACTAM SODIUM 4.5 G: 4; .5 INJECTION, POWDER, FOR SOLUTION INTRAVENOUS at 01:11

## 2023-11-26 RX ADMIN — FUROSEMIDE 40 MG: 10 INJECTION, SOLUTION INTRAVENOUS at 09:11

## 2023-11-26 RX ADMIN — ASPIRIN 81 MG: 81 TABLET, COATED ORAL at 09:11

## 2023-11-26 RX ADMIN — HYDROCODONE BITARTRATE AND ACETAMINOPHEN 1 TABLET: 10; 325 TABLET ORAL at 11:11

## 2023-11-26 RX ADMIN — GABAPENTIN 100 MG: 100 CAPSULE ORAL at 02:11

## 2023-11-26 RX ADMIN — AZITHROMYCIN 500 MG: 250 TABLET, FILM COATED ORAL at 09:11

## 2023-11-26 RX ADMIN — GABAPENTIN 100 MG: 100 CAPSULE ORAL at 09:11

## 2023-11-26 RX ADMIN — ENOXAPARIN SODIUM 40 MG: 40 INJECTION SUBCUTANEOUS at 04:11

## 2023-11-26 RX ADMIN — VANCOMYCIN HYDROCHLORIDE 1500 MG: 1.5 INJECTION, POWDER, LYOPHILIZED, FOR SOLUTION INTRAVENOUS at 11:11

## 2023-11-26 RX ADMIN — PIPERACILLIN SODIUM AND TAZOBACTAM SODIUM 4.5 G: 4; .5 INJECTION, POWDER, FOR SOLUTION INTRAVENOUS at 04:11

## 2023-11-26 RX ADMIN — PIPERACILLIN SODIUM AND TAZOBACTAM SODIUM 4.5 G: 4; .5 INJECTION, POWDER, FOR SOLUTION INTRAVENOUS at 09:11

## 2023-11-26 RX ADMIN — FOLIC ACID 1 MG: 1 TABLET ORAL at 09:11

## 2023-11-26 RX ADMIN — NIFEDIPINE 30 MG: 30 TABLET, FILM COATED, EXTENDED RELEASE ORAL at 09:11

## 2023-11-26 NOTE — PROGRESS NOTES
[facility] - Tele[facility] Medicine  Progress Note    Patient Name: Christiano Dahl  MRN: [id number]  Patient Class: IP- Inpatient   Admission Date: 11/24/2023  Length of Stay: 2 days  Attending Physician: Davis Villalpando MD  Primary Care Provider: Jaci Newton MD        Subjective:     Principal Problem:Acute hypercapnic respiratory failure        HPI:  No notes on file    Overview/Hospital Course:  No notes on file    Interval History: Feeling better rested well, uop -850, resp status is improved. Will review PT notes for disposition.    Review of Systems  Objective:     Vital Signs (Most Recent):  Temp: 98.5 °F (36.9 °C) (11/26/23 1212)  Pulse: 65 (11/26/23 1212)  Resp: 17 (11/26/23 1212)  BP: (!) 102/59 (11/26/23 1212)  SpO2: 98 % (11/26/23 1212) Vital Signs (24h Range):  Temp:  [98.5 °F (36.9 °C)-100.6 °F (38.1 °C)] 98.5 °F (36.9 °C)  Pulse:  [65-91] 65  Resp:  [17-24] 17  SpO2:  [92 %-98 %] 98 %  BP: (102-157)/(11-73) 102/59     Weight: 101.5 kg (223 lb 12.3 oz)  Body mass index is 35.05 kg/m².    Intake/Output Summary (Last 24 hours) at 11/26/2023 1308  Last data filed at 11/26/2023 0435  Gross per 24 hour   Intake --   Output 850 ml   Net -850 ml         Physical Exam  Vitals and nursing note reviewed.   Constitutional:       General: He is sleeping.      Appearance: He is well-developed. He is obese.   HENT:      Head: Normocephalic.      Mouth/Throat:      Mouth: Mucous membranes are moist.   Eyes:      Pupils: Pupils are equal, round, and reactive to light.   Cardiovascular:      Rate and Rhythm: Normal rate and regular rhythm.      Pulses: Normal pulses.   Pulmonary:      Comments: On Bipap, good air movement, no crackles appreciated on anterior lung fields    Abdominal:      General: Bowel sounds are normal. There is no distension.      Palpations: Abdomen is soft.      Tenderness: There is no abdominal tenderness.   Musculoskeletal:         General: No swelling.   Skin:     General:  Skin is warm and dry.   Neurological:      General: No focal deficit present.      Mental Status: He is easily aroused.             Significant Labs: All pertinent labs within the past 24 hours have been reviewed.  BMP:   Recent Labs   Lab 11/26/23  0301   *      K 3.3*   CL 99   CO2 31*   BUN 18   CREATININE 1.0   CALCIUM 8.6*   MG 1.8       Significant Imaging: I have reviewed all pertinent imaging results/findings within the past 24 hours.    Assessment/Plan:      * Acute hypercapnic respiratory failure  Patient with Hypercapnic Respiratory failure which is Acute on chronic.  he is not on home oxygen. Supplemental oxygen was provided and noted- Oxygen Concentration (%):  [40] 40     Signs/symptoms of respiratory failure include- increased work of breathing and lethargy. Contributing diagnoses includes - CHF and Pleural effusion Labs and images were reviewed. Patient Has recent ABG, which has been reviewed. 7.3/60.4/35/31.1 Will treat underlying causes and adjust management of respiratory failure as follows-      -BIPAP 16/5 25%, Will continue to wean overnight  -Recent admission 11/19-11/21 for Hypoxic respiratory failure 2/2 CAP/ COVID  -WBC to 17 in the ED  -Continue broad spectrum abx with Vanc zosyn  -F/u flu/covid/RSV  -Diuresis for goal net negative  -Will need outpatient sleep on discharge    ELVIS (acute kidney injury)  Patient with acute kidney injury/acute renal failure likely due to pre-renal azotemia due to dehydration ELVIS is currently improving. Baseline creatinine - Labs reviewed- Renal function/electrolytes with Estimated Creatinine Clearance: 85.9 mL/min (based on SCr of 1 mg/dL). according to latest data. Monitor urine output and serial BMP and adjust therapy as needed. Avoid nephrotoxins and renally dose meds for GFR listed above.    Diabetes mellitus, type 2  Patient's FSGs are uncontrolled due to hyperglycemia on current medication regimen.  Last A1c reviewed-   Lab Results    Component Value Date    HGBA1C 8.1 (H) 10/05/2023     Most recent fingerstick glucose reviewed-   Recent Labs   Lab 11/26/23  0840 11/26/23  1215   POCTGLUCOSE 133* 188*     Current correctional scale  Medium  Decrease anti-hyperglycemic dose as follows-   Antihyperglycemics (From admission, onward)      Start     Stop Route Frequency Ordered    11/24/23 2330  insulin aspart U-100 pen 0-5 Units         -- SubQ Before meals & nightly PRN 11/24/23 2230          Hold Oral hypoglycemics while patient is in the hospital.      VTE Risk Mitigation (From admission, onward)           Ordered     enoxaparin injection 40 mg  Daily         11/24/23 2129     IP VTE HIGH RISK PATIENT  Once         11/24/23 2129     Place sequential compression device  Until discontinued         11/24/23 2129                    Discharge Planning   YOSELYN:      Code Status: Full Code   Is the patient medically ready for discharge?:     Reason for patient still in hospital (select all that apply): Patient unstable                     Davis Villalpando MD  Department of Hospital Medicine   [facility] - Telemetry Stepdown

## 2023-11-26 NOTE — NURSING TRANSFER
Nursing Transfer Note      11/25/2023   6:55 PM    Nurse giving handoff:Mac     Reason patient is being transferred: step down    Transfer To: 8097    Transfer via bed    Transfer with  to O2, cardiac monitoring    Transported by LISA Li RN / Jimenez PCT      Telemetry: yes  Order for Tele Monitor? Yes    Additional Lines: Oxygen    4eyes on Skin: yes    Medicines sent: yes    Any special needs or follow-up needed: no    Patient belongings transferred with patient: Yes    Chart send with patient: Yes    Notified: spouse    Patient reassessed at: 11/25 4251 (date, time)  1  Upon arrival to floor: cardiac monitor applied, patient oriented to room, call bell in reach, and bed in lowest position  
[Negative] : Endocrine

## 2023-11-26 NOTE — NURSING
Patient accepted into 8097 per bed with ICU nurse and PCT. Vital signs taken and initial settlement.   Nurses Note -- 4 Eyes      11/25/2023   7:13 PM      Skin assessed during: Transfer      [] No Altered Skin Integrity Present    []Prevention Measures Documented      [x] Yes- Altered Skin Integrity Present or Discovered   [] LDA Added if Not in Epic (Describe Wound)   [] New Altered Skin Integrity was Present on Admit and Documented in LDA   [] Wound Image Taken    Wound Care Consulted? No    Attending Nurse:  Karishma SMALLS    Second RN/Staff Member:  Jessica Crowder RN

## 2023-11-26 NOTE — PROGRESS NOTES
Pharmacokinetic Assessment Follow Up: IV Vancomycin    Vancomycin serum concentration assessment(s):  - The vancomycin random level, drawn at ~ 20.5 hours, was 10 mcg/mL which is below the goal range of 15-20 mcg.  - As renal function is not yet stable, mathematical calculations of a true 24-hour trough may not be accurate.   - Renal function is continuing to improve towards baseline (~ 0.8 mg/dL).    Vancomycin Regimen Plan:  - Will re-dose now with 1500 mg (15 mg/kg) IV x 1 dose.   - Obtain another random level on 11/26 @ 2100 (~ 12 hour level) to assess for re-dosing given renal function recovery.   - May consider a scheduled regimen if renal function continues to improve.   - Monitor for signs and symptoms of nephrotoxicity and infusion reactions.       Drug levels (last 3 results):  Recent Labs   Lab Result Units 11/25/23  1709 11/26/23  0301   Vancomycin, Random ug/mL  --  10.0   Vancomycin-Trough ug/mL 19.8  --        Pharmacy will continue to follow and monitor vancomycin.    Please contact pharmacy at extension [phone] for questions regarding this assessment.    Thank you for the consult,   Allie Rodriguez       Patient brief summary:  Christiano Dahl is a 62 y.o. male initiated on antimicrobial therapy with IV Vancomycin for treatment of lower respiratory infection    The patient's current regimen is dose by levels due to renal function.    Drug Allergies:   Review of patient's allergies indicates:  No Known Allergies    Actual Body Weight:   101.5 kg    Renal Function:   Estimated Creatinine Clearance: 85.9 mL/min (based on SCr of 1 mg/dL).,   ELVIS - improving    CBC (last 72 hours):  Recent Labs   Lab Result Units 11/24/23  1826 11/24/23  2223 11/25/23  0909 11/26/23  0301   WBC K/uL 17.56* 16.36* 14.57* 12.74*   Hemoglobin g/dL 10.1* 9.3* 9.9* 9.6*   Hematocrit % 33.5* 30.2* 32.5* 31.9*   Platelets K/uL 381 351 354 344   Gran % % 65.8 68.4  --   --    Lymph % % 21.9 20.5  --   --    Mono % % 9.4 8.9  --    --    Eosinophil % % 1.0 0.9  --   --    Basophil % % 0.5 0.4  --   --    Differential Method  Automated Automated  --   --        Metabolic Panel (last 72 hours):  Recent Labs   Lab Result Units 11/24/23  1826 11/25/23  0147 11/25/23  0315 11/26/23  0301   Sodium mmol/L 142  --  139 141   Potassium mmol/L 4.3  --  4.1 3.3*   Chloride mmol/L 104  --  103 99   CO2 mmol/L 26  --  20* 31*   Glucose mg/dL 120*  --  109 126*   BUN mg/dL 35*  --  32* 18   Creatinine mg/dL 1.3  --  1.4 1.0   Creatinine, Urine mg/dL  --  54.0  --   --    Albumin g/dL 2.8*  --  2.5* 2.4*   Total Bilirubin mg/dL 0.4  --  0.5 0.5   Alkaline Phosphatase U/L 130  --  120 101   AST U/L 15  --  15 18   ALT U/L 19  --  17 26   Magnesium mg/dL  --   --  1.6 1.8   Phosphorus mg/dL  --   --  5.0* 3.5       Vancomycin Administrations:  vancomycin given in the last 96 hours                     vancomycin 1,250 mg in dextrose 5 % (D5W) 250 mL IVPB (Vial-Mate) (mg) 1,250 mg New Bag 11/25/23 0630    vancomycin 2 g in dextrose 5 % 500 mL IVPB ()  Restarted 11/24/23 2039     2,000 mg New Bag  2020                    Microbiologic Results:  Microbiology Results (last 7 days)       Procedure Component Value Units Date/Time    Blood culture #2 [[id number]] Collected: 11/24/23 1827    Order Status: Completed Specimen: Blood from Peripheral, Hand, Right Updated: 11/25/23 2012     Blood Culture, Routine No Growth to date      No Growth to date    Blood culture #1 [[id number]] Collected: 11/24/23 1828    Order Status: Completed Specimen: Blood from Peripheral, Hand, Left Updated: 11/25/23 2012     Blood Culture, Routine No Growth to date      No Growth to date    Respiratory Infection Panel (PCR), Nasopharyngeal [[id number]] Collected: 11/25/23 0916    Order Status: Completed Specimen: Nasopharyngeal Swab Updated: 11/25/23 1100     Respiratory Infection Panel Source NP Swab     Adenovirus Not Detected     Coronavirus 229E, Common Cold Virus Not Detected      Coronavirus HKU1, Common Cold Virus Not Detected     Coronavirus NL63, Common Cold Virus Not Detected     Coronavirus OC43, Common Cold Virus Not Detected     Comment: The Coronavirus strains detected in this test cause the common cold.  These strains are not the COVID-19 (novel Coronavirus)strain   associated with the respiratory disease outbreak.          SARS-CoV2 (COVID-19) Qualitative PCR Not Detected     Human Metapneumovirus Not Detected     Human Rhinovirus/Enterovirus Not Detected     Influenza A (subtypes H1, H1-2009,H3) Not Detected     Influenza B Not Detected     Parainfluenza Virus 1 Not Detected     Parainfluenza Virus 2 Not Detected     Parainfluenza Virus 3 Not Detected     Parainfluenza Virus 4 Not Detected     Respiratory Syncytial Virus Not Detected     Bordetella Parapertussis (SN8487) Not Detected     Bordetella pertussis (ptxP) Not Detected     Chlamydia pneumoniae Not Detected     Mycoplasma pneumoniae Not Detected    Narrative:      For all other respiratory sources, order [id number] -  Respiratory Viral Panel by PCR    Culture, MRSA [[id number]] Collected: 11/25/23 0916    Order Status: Sent Specimen: MRSA source from [place], Left Updated: 11/25/23 0988

## 2023-11-26 NOTE — PLAN OF CARE
Problem: Adult Inpatient Plan of Care  Goal: Plan of Care Review  Outcome: Ongoing, Progressing  Goal: Patient-Specific Goal (Individualized)  Outcome: Ongoing, Progressing  Goal: Absence of Hospital-Acquired Illness or Injury  Outcome: Ongoing, Progressing  Goal: Optimal Comfort and Wellbeing  Outcome: Ongoing, Progressing  Goal: Readiness for Transition of Care  Outcome: Ongoing, Progressing     Problem: Diabetes Comorbidity  Goal: Blood Glucose Level Within Targeted Range  Outcome: Ongoing, Progressing     Problem: Fluid and Electrolyte Imbalance (Acute Kidney Injury/Impairment)  Goal: Fluid and Electrolyte Balance  Outcome: Ongoing, Progressing     Problem: Oral Intake Inadequate (Acute Kidney Injury/Impairment)  Goal: Optimal Nutrition Intake  Outcome: Ongoing, Progressing     Problem: Renal Function Impairment (Acute Kidney Injury/Impairment)  Goal: Effective Renal Function  Outcome: Ongoing, Progressing     Problem: Fluid Imbalance (Pneumonia)  Goal: Fluid Balance  Outcome: Ongoing, Progressing     Problem: Infection (Pneumonia)  Goal: Resolution of Infection Signs and Symptoms  Outcome: Ongoing, Progressing     Problem: Respiratory Compromise (Pneumonia)  Goal: Effective Oxygenation and Ventilation  Outcome: Ongoing, Progressing     Problem: Impaired Wound Healing  Goal: Optimal Wound Healing  Outcome: Ongoing, Progressing     Problem: Infection  Goal: Absence of Infection Signs and Symptoms  Outcome: Ongoing, Progressing     Problem: Skin Injury Risk Increased  Goal: Skin Health and Integrity  Outcome: Ongoing, Progressing   Patient sitting in chair all shift with fiancee at side. Glucose monitored without coverage needed. IVPB antibiotics infused.

## 2023-11-26 NOTE — PLAN OF CARE
Problem: Adult Inpatient Plan of Care  Goal: Plan of Care Review  Outcome: Ongoing, Progressing  Goal: Patient-Specific Goal (Individualized)  Outcome: Ongoing, Progressing  Goal: Absence of Hospital-Acquired Illness or Injury  Outcome: Ongoing, Progressing  Goal: Optimal Comfort and Wellbeing  Outcome: Ongoing, Progressing  Goal: Readiness for Transition of Care  Outcome: Ongoing, Progressing     Problem: Diabetes Comorbidity  Goal: Blood Glucose Level Within Targeted Range  Outcome: Ongoing, Progressing     Problem: Fluid and Electrolyte Imbalance (Acute Kidney Injury/Impairment)  Goal: Fluid and Electrolyte Balance  Outcome: Ongoing, Progressing     Problem: Oral Intake Inadequate (Acute Kidney Injury/Impairment)  Goal: Optimal Nutrition Intake  Outcome: Ongoing, Progressing     Problem: Renal Function Impairment (Acute Kidney Injury/Impairment)  Goal: Effective Renal Function  Outcome: Ongoing, Progressing     Problem: Fluid Imbalance (Pneumonia)  Goal: Fluid Balance  Outcome: Ongoing, Progressing     Problem: Infection (Pneumonia)  Goal: Resolution of Infection Signs and Symptoms  Outcome: Ongoing, Progressing     Problem: Respiratory Compromise (Pneumonia)  Goal: Effective Oxygenation and Ventilation  Outcome: Ongoing, Progressing     Problem: Impaired Wound Healing  Goal: Optimal Wound Healing  Outcome: Ongoing, Progressing     Problem: Infection  Goal: Absence of Infection Signs and Symptoms  Outcome: Ongoing, Progressing     Problem: Skin Injury Risk Increased  Goal: Skin Health and Integrity  Outcome: Ongoing, Progressing

## 2023-11-26 NOTE — PROGRESS NOTES
Pharmacokinetic Assessment Follow Up: IV Vancomycin    Vancomycin serum concentration assessment(s):    The trough level was drawn correctly and can be used to guide therapy at this time. The measurement is within the desired definitive target range of 10 to 20 mcg/mL.  - The trough level was drawn ~11 hours after the previous dose and resulted at 19.8. This level was drawn after only two doses.     Vancomycin Regimen Plan:    Will not re-dose now given level almost 20 after only two doses.  - Re-dose when the random level is less than 20 mcg/mL, next level to be drawn at 0430 with AM labs on 11/26.  - Mr. Dahl has an ELVIS with SCR increasing. Baseline ~0.8-0.9.   - Will continue pulse dosing in the setting of ELVIS.   - Will get next level with AM labs ~24 hours from previous dose to determine clearance and to re-dose vancomycin.     Drug levels (last 3 results):  Recent Labs   Lab Result Units 11/25/23  1709   Vancomycin-Trough ug/mL 19.8       Pharmacy will continue to follow and monitor vancomycin.    Please contact pharmacy at extension [phone] for questions regarding this assessment.    Thank you for the consult,   Kristina King       Patient brief summary:  Christiano Dahl is a 62 y.o. male initiated on antimicrobial therapy with IV Vancomycin for treatment of sepsis    The patient's current regimen is pulse dosing in the setting of ELVIS    Actual Body Weight:   101.5 kg    Renal Function:   Estimated Creatinine Clearance: 61.3 mL/min (based on SCr of 1.4 mg/dL).     Dialysis Method (if applicable):  N/A

## 2023-11-26 NOTE — ASSESSMENT & PLAN NOTE
Patient's FSGs are uncontrolled due to hyperglycemia on current medication regimen.  Last A1c reviewed-   Lab Results   Component Value Date    HGBA1C 8.1 (H) 10/05/2023     Most recent fingerstick glucose reviewed-   Recent Labs   Lab 11/26/23  0840 11/26/23  1215   POCTGLUCOSE 133* 188*     Current correctional scale  Medium  Decrease anti-hyperglycemic dose as follows-   Antihyperglycemics (From admission, onward)      Start     Stop Route Frequency Ordered    11/24/23 2330  insulin aspart U-100 pen 0-5 Units         -- SubQ Before meals & nightly PRN 11/24/23 2230          Hold Oral hypoglycemics while patient is in the hospital.

## 2023-11-26 NOTE — SUBJECTIVE & OBJECTIVE
Interval History: Feeling better rested well, uop -850, resp status is improved. Will review PT notes for disposition.    Review of Systems  Objective:     Vital Signs (Most Recent):  Temp: 98.5 °F (36.9 °C) (11/26/23 1212)  Pulse: 65 (11/26/23 1212)  Resp: 17 (11/26/23 1212)  BP: (!) 102/59 (11/26/23 1212)  SpO2: 98 % (11/26/23 1212) Vital Signs (24h Range):  Temp:  [98.5 °F (36.9 °C)-100.6 °F (38.1 °C)] 98.5 °F (36.9 °C)  Pulse:  [65-91] 65  Resp:  [17-24] 17  SpO2:  [92 %-98 %] 98 %  BP: (102-157)/(11-73) 102/59     Weight: 101.5 kg (223 lb 12.3 oz)  Body mass index is 35.05 kg/m².    Intake/Output Summary (Last 24 hours) at 11/26/2023 1308  Last data filed at 11/26/2023 0435  Gross per 24 hour   Intake --   Output 850 ml   Net -850 ml         Physical Exam  Vitals and nursing note reviewed.   Constitutional:       General: He is sleeping.      Appearance: He is well-developed. He is obese.   HENT:      Head: Normocephalic.      Mouth/Throat:      Mouth: Mucous membranes are moist.   Eyes:      Pupils: Pupils are equal, round, and reactive to light.   Cardiovascular:      Rate and Rhythm: Normal rate and regular rhythm.      Pulses: Normal pulses.   Pulmonary:      Comments: On Bipap, good air movement, no crackles appreciated on anterior lung fields    Abdominal:      General: Bowel sounds are normal. There is no distension.      Palpations: Abdomen is soft.      Tenderness: There is no abdominal tenderness.   Musculoskeletal:         General: No swelling.   Skin:     General: Skin is warm and dry.   Neurological:      General: No focal deficit present.      Mental Status: He is easily aroused.             Significant Labs: All pertinent labs within the past 24 hours have been reviewed.  BMP:   Recent Labs   Lab 11/26/23  0301   *      K 3.3*   CL 99   CO2 31*   BUN 18   CREATININE 1.0   CALCIUM 8.6*   MG 1.8       Significant Imaging: I have reviewed all pertinent imaging results/findings within  the past 24 hours.

## 2023-11-26 NOTE — ASSESSMENT & PLAN NOTE
Patient with acute kidney injury/acute renal failure likely due to pre-renal azotemia due to dehydration ELVIS is currently improving. Baseline creatinine - Labs reviewed- Renal function/electrolytes with Estimated Creatinine Clearance: 85.9 mL/min (based on SCr of 1 mg/dL). according to latest data. Monitor urine output and serial BMP and adjust therapy as needed. Avoid nephrotoxins and renally dose meds for GFR listed above.

## 2023-11-27 LAB
ALBUMIN SERPL BCP-MCNC: 2.5 G/DL (ref 3.5–5.2)
ALP SERPL-CCNC: 114 U/L (ref 55–135)
ALT SERPL W/O P-5'-P-CCNC: 24 U/L (ref 10–44)
ANION GAP SERPL CALC-SCNC: 13 MMOL/L (ref 8–16)
AST SERPL-CCNC: 13 U/L (ref 10–40)
BILIRUB SERPL-MCNC: 0.5 MG/DL (ref 0.1–1)
BUN SERPL-MCNC: 17 MG/DL (ref 8–23)
CALCIUM SERPL-MCNC: 8.8 MG/DL (ref 8.7–10.5)
CHLORIDE SERPL-SCNC: 96 MMOL/L (ref 95–110)
CO2 SERPL-SCNC: 31 MMOL/L (ref 23–29)
CREAT SERPL-MCNC: 0.9 MG/DL (ref 0.5–1.4)
ERYTHROCYTE [DISTWIDTH] IN BLOOD BY AUTOMATED COUNT: 16.4 % (ref 11.5–14.5)
EST. GFR  (NO RACE VARIABLE): >60 ML/MIN/1.73 M^2
GLUCOSE SERPL-MCNC: 119 MG/DL (ref 70–110)
HCT VFR BLD AUTO: 32.6 % (ref 40–54)
HGB BLD-MCNC: 10 G/DL (ref 14–18)
MAGNESIUM SERPL-MCNC: 1.6 MG/DL (ref 1.6–2.6)
MCH RBC QN AUTO: 25.6 PG (ref 27–31)
MCHC RBC AUTO-ENTMCNC: 30.7 G/DL (ref 32–36)
MCV RBC AUTO: 83 FL (ref 82–98)
MRSA SPEC QL CULT: NORMAL
PHOSPHATE SERPL-MCNC: 3.2 MG/DL (ref 2.7–4.5)
PLATELET # BLD AUTO: 367 K/UL (ref 150–450)
PMV BLD AUTO: 9.8 FL (ref 9.2–12.9)
POCT GLUCOSE: 106 MG/DL (ref 70–110)
POCT GLUCOSE: 114 MG/DL (ref 70–110)
POCT GLUCOSE: 116 MG/DL (ref 70–110)
POCT GLUCOSE: 165 MG/DL (ref 70–110)
POCT GLUCOSE: 170 MG/DL (ref 70–110)
POTASSIUM SERPL-SCNC: 3.4 MMOL/L (ref 3.5–5.1)
PROT SERPL-MCNC: 7.4 G/DL (ref 6–8.4)
RBC # BLD AUTO: 3.91 M/UL (ref 4.6–6.2)
SODIUM SERPL-SCNC: 140 MMOL/L (ref 136–145)
VANCOMYCIN SERPL-MCNC: 15.7 UG/ML
VANCOMYCIN SERPL-MCNC: 16.5 UG/ML
WBC # BLD AUTO: 14.68 K/UL (ref 3.9–12.7)

## 2023-11-27 PROCEDURE — 20600001 HC STEP DOWN PRIVATE ROOM

## 2023-11-27 PROCEDURE — 25000003 PHARM REV CODE 250

## 2023-11-27 PROCEDURE — 99900035 HC TECH TIME PER 15 MIN (STAT)

## 2023-11-27 PROCEDURE — 25000003 PHARM REV CODE 250: Performed by: STUDENT IN AN ORGANIZED HEALTH CARE EDUCATION/TRAINING PROGRAM

## 2023-11-27 PROCEDURE — 99232 PR SUBSEQUENT HOSPITAL CARE,LEVL II: ICD-10-PCS | Mod: ,,, | Performed by: INTERNAL MEDICINE

## 2023-11-27 PROCEDURE — 94761 N-INVAS EAR/PLS OXIMETRY MLT: CPT

## 2023-11-27 PROCEDURE — 80202 ASSAY OF VANCOMYCIN: CPT | Performed by: INTERNAL MEDICINE

## 2023-11-27 PROCEDURE — 63600175 PHARM REV CODE 636 W HCPCS

## 2023-11-27 PROCEDURE — 63600175 PHARM REV CODE 636 W HCPCS: Performed by: INTERNAL MEDICINE

## 2023-11-27 PROCEDURE — 94760 N-INVAS EAR/PLS OXIMETRY 1: CPT

## 2023-11-27 PROCEDURE — 99232 SBSQ HOSP IP/OBS MODERATE 35: CPT | Mod: ,,, | Performed by: INTERNAL MEDICINE

## 2023-11-27 PROCEDURE — 94660 CPAP INITIATION&MGMT: CPT

## 2023-11-27 PROCEDURE — S4991 NICOTINE PATCH NONLEGEND: HCPCS

## 2023-11-27 PROCEDURE — 80053 COMPREHEN METABOLIC PANEL: CPT

## 2023-11-27 PROCEDURE — 63700000 PHARM REV CODE 250 ALT 637 W/O HCPCS

## 2023-11-27 PROCEDURE — 36415 COLL VENOUS BLD VENIPUNCTURE: CPT

## 2023-11-27 PROCEDURE — 36415 COLL VENOUS BLD VENIPUNCTURE: CPT | Performed by: INTERNAL MEDICINE

## 2023-11-27 PROCEDURE — 83735 ASSAY OF MAGNESIUM: CPT

## 2023-11-27 PROCEDURE — 27100171 HC OXYGEN HIGH FLOW UP TO 24 HOURS

## 2023-11-27 PROCEDURE — 84100 ASSAY OF PHOSPHORUS: CPT

## 2023-11-27 PROCEDURE — 25000003 PHARM REV CODE 250: Performed by: INTERNAL MEDICINE

## 2023-11-27 PROCEDURE — 85027 COMPLETE CBC AUTOMATED: CPT

## 2023-11-27 RX ADMIN — ENOXAPARIN SODIUM 40 MG: 40 INJECTION SUBCUTANEOUS at 05:11

## 2023-11-27 RX ADMIN — FUROSEMIDE 40 MG: 10 INJECTION, SOLUTION INTRAVENOUS at 08:11

## 2023-11-27 RX ADMIN — FLUTICASONE PROPIONATE 100 MCG: 50 SPRAY, METERED NASAL at 09:11

## 2023-11-27 RX ADMIN — FOLIC ACID 1 MG: 1 TABLET ORAL at 08:11

## 2023-11-27 RX ADMIN — VANCOMYCIN HYDROCHLORIDE 1250 MG: 1.25 INJECTION, POWDER, LYOPHILIZED, FOR SOLUTION INTRAVENOUS at 01:11

## 2023-11-27 RX ADMIN — PIPERACILLIN SODIUM AND TAZOBACTAM SODIUM 4.5 G: 4; .5 INJECTION, POWDER, FOR SOLUTION INTRAVENOUS at 07:11

## 2023-11-27 RX ADMIN — ATORVASTATIN CALCIUM 20 MG: 20 TABLET, FILM COATED ORAL at 08:11

## 2023-11-27 RX ADMIN — Medication 1 PATCH: at 08:11

## 2023-11-27 RX ADMIN — NIFEDIPINE 30 MG: 30 TABLET, FILM COATED, EXTENDED RELEASE ORAL at 08:11

## 2023-11-27 RX ADMIN — GABAPENTIN 100 MG: 100 CAPSULE ORAL at 03:11

## 2023-11-27 RX ADMIN — PIPERACILLIN SODIUM AND TAZOBACTAM SODIUM 4.5 G: 4; .5 INJECTION, POWDER, FOR SOLUTION INTRAVENOUS at 12:11

## 2023-11-27 RX ADMIN — GABAPENTIN 100 MG: 100 CAPSULE ORAL at 09:11

## 2023-11-27 RX ADMIN — HYDROCODONE BITARTRATE AND ACETAMINOPHEN 1 TABLET: 10; 325 TABLET ORAL at 11:11

## 2023-11-27 RX ADMIN — VANCOMYCIN HYDROCHLORIDE 1000 MG: 1 INJECTION, POWDER, LYOPHILIZED, FOR SOLUTION INTRAVENOUS at 05:11

## 2023-11-27 RX ADMIN — AZITHROMYCIN 500 MG: 250 TABLET, FILM COATED ORAL at 08:11

## 2023-11-27 RX ADMIN — PIPERACILLIN SODIUM AND TAZOBACTAM SODIUM 4.5 G: 4; .5 INJECTION, POWDER, FOR SOLUTION INTRAVENOUS at 03:11

## 2023-11-27 RX ADMIN — ASPIRIN 81 MG: 81 TABLET, COATED ORAL at 08:11

## 2023-11-27 RX ADMIN — GABAPENTIN 100 MG: 100 CAPSULE ORAL at 08:11

## 2023-11-27 RX ADMIN — HYDROCODONE BITARTRATE AND ACETAMINOPHEN 1 TABLET: 10; 325 TABLET ORAL at 03:11

## 2023-11-27 NOTE — PLAN OF CARE
PT alert and oriented x 4. Able to voice all wants and needs. All needs met.  He has remained free of falls and injuries. Safety eduction and plan of care reviewed with PT and he voiced understanding. Bed is low and locked with call light with in easy reach.

## 2023-11-27 NOTE — PROGRESS NOTES
[facility] - Tele[facility] Medicine  Progress Note    Patient Name: Christiano Dahl  MRN: [id number]  Patient Class: IP- Inpatient   Admission Date: 11/24/2023  Length of Stay: 3 days  Attending Physician: Davis Villalpando MD  Primary Care Provider: Jaci Newton MD        Subjective:     Principal Problem:Acute hypercapnic respiratory failure        HPI:  No notes on file    Overview/Hospital Course:  No notes on file    Interval History: Tolerating and needing BIPAP at nighty, will consult PT, may dc home in ma..    Review of Systems  Objective:     Vital Signs (Most Recent):  Temp: 98.9 °F (37.2 °C) (11/27/23 0814)  Pulse: 73 (11/27/23 0814)  Resp: 18 (11/26/23 2355)  BP: 134/63 (11/27/23 0814)  SpO2: 95 % (11/27/23 0814) Vital Signs (24h Range):  Temp:  [98.5 °F (36.9 °C)-99.3 °F (37.4 °C)] 98.9 °F (37.2 °C)  Pulse:  [65-77] 73  Resp:  [17-20] 18  SpO2:  [89 %-100 %] 95 %  BP: (102-134)/(55-63) 134/63     Weight: 101.5 kg (223 lb 12.3 oz)  Body mass index is 35.05 kg/m².    Intake/Output Summary (Last 24 hours) at 11/27/2023 0919  Last data filed at 11/26/2023 1642  Gross per 24 hour   Intake 1170 ml   Output 700 ml   Net 470 ml         Physical Exam  Vitals and nursing note reviewed.   Constitutional:       General: He is sleeping.      Appearance: He is well-developed. He is obese.   HENT:      Head: Normocephalic.      Mouth/Throat:      Mouth: Mucous membranes are moist.   Eyes:      Pupils: Pupils are equal, round, and reactive to light.   Cardiovascular:      Rate and Rhythm: Normal rate and regular rhythm.      Pulses: Normal pulses.   Pulmonary:      Comments: On Bipap, good air movement, no crackles appreciated on anterior lung fields    Abdominal:      General: Bowel sounds are normal. There is no distension.      Palpations: Abdomen is soft.      Tenderness: There is no abdominal tenderness.   Musculoskeletal:         General: No swelling.   Skin:     General: Skin is warm and dry.    Neurological:      General: No focal deficit present.      Mental Status: He is easily aroused.             Significant Labs: All pertinent labs within the past 24 hours have been reviewed.  BMP:   Recent Labs   Lab 11/27/23  0532   *      K 3.4*   CL 96   CO2 31*   BUN 17   CREATININE 0.9   CALCIUM 8.8   MG 1.6       Significant Imaging: I have reviewed all pertinent imaging results/findings within the past 24 hours.    Assessment/Plan:      * Acute hypercapnic respiratory failure  Patient with Hypercapnic Respiratory failure which is Acute on chronic.  he is not on home oxygen. Supplemental oxygen was provided and noted- Oxygen Concentration (%):  [40] 40     Signs/symptoms of respiratory failure include- increased work of breathing and lethargy. Contributing diagnoses includes - CHF and Pleural effusion Labs and images were reviewed. Patient Has recent ABG, which has been reviewed. 7.3/60.4/35/31.1 Will treat underlying causes and adjust management of respiratory failure as follows-      -BIPAP 16/5 25%, Will continue to wean overnight  -Recent admission 11/19-11/21 for Hypoxic respiratory failure 2/2 CAP/ COVID  -WBC to 17 in the ED  -Continue broad spectrum abx with Vanc zosyn  -F/u flu/covid/RSV  -Diuresis for goal net negative  -Will need outpatient sleep on discharge    ELVIS (acute kidney injury)  Patient with acute kidney injury/acute renal failure likely due to pre-renal azotemia due to dehydration ELVIS is currently improving. Baseline creatinine - Labs reviewed- Renal function/electrolytes with Estimated Creatinine Clearance: 85.9 mL/min (based on SCr of 1 mg/dL). according to latest data. Monitor urine output and serial BMP and adjust therapy as needed. Avoid nephrotoxins and renally dose meds for GFR listed above.    Diabetes mellitus, type 2  Patient's FSGs are uncontrolled due to hyperglycemia on current medication regimen.  Last A1c reviewed-   Lab Results   Component Value Date     HGBA1C 8.1 (H) 10/05/2023     Most recent fingerstick glucose reviewed-   Recent Labs   Lab 11/26/23  0840 11/26/23  1215   POCTGLUCOSE 133* 188*     Current correctional scale  Medium  Decrease anti-hyperglycemic dose as follows-   Antihyperglycemics (From admission, onward)      Start     Stop Route Frequency Ordered    11/24/23 2330  insulin aspart U-100 pen 0-5 Units         -- SubQ Before meals & nightly PRN 11/24/23 2230          Hold Oral hypoglycemics while patient is in the hospital.      VTE Risk Mitigation (From admission, onward)           Ordered     enoxaparin injection 40 mg  Daily         11/24/23 2129     IP VTE HIGH RISK PATIENT  Once         11/24/23 2129     Place sequential compression device  Until discontinued         11/24/23 2129                    Discharge Planning   YOSELYN:      Code Status: Full Code   Is the patient medically ready for discharge?:     Reason for patient still in hospital (select all that apply): Patient unstable                     Davis Villalpando MD  Department of Hospital Medicine   [facility] - Telemetry Stepdown

## 2023-11-27 NOTE — SUBJECTIVE & OBJECTIVE
Interval History: Tolerating and needing BIPAP at nighty, will consult PT, may dc home in ma..    Review of Systems  Objective:     Vital Signs (Most Recent):  Temp: 98.9 °F (37.2 °C) (11/27/23 0814)  Pulse: 73 (11/27/23 0814)  Resp: 18 (11/26/23 2355)  BP: 134/63 (11/27/23 0814)  SpO2: 95 % (11/27/23 0814) Vital Signs (24h Range):  Temp:  [98.5 °F (36.9 °C)-99.3 °F (37.4 °C)] 98.9 °F (37.2 °C)  Pulse:  [65-77] 73  Resp:  [17-20] 18  SpO2:  [89 %-100 %] 95 %  BP: (102-134)/(55-63) 134/63     Weight: 101.5 kg (223 lb 12.3 oz)  Body mass index is 35.05 kg/m².    Intake/Output Summary (Last 24 hours) at 11/27/2023 0919  Last data filed at 11/26/2023 1642  Gross per 24 hour   Intake 1170 ml   Output 700 ml   Net 470 ml         Physical Exam  Vitals and nursing note reviewed.   Constitutional:       General: He is sleeping.      Appearance: He is well-developed. He is obese.   HENT:      Head: Normocephalic.      Mouth/Throat:      Mouth: Mucous membranes are moist.   Eyes:      Pupils: Pupils are equal, round, and reactive to light.   Cardiovascular:      Rate and Rhythm: Normal rate and regular rhythm.      Pulses: Normal pulses.   Pulmonary:      Comments: On Bipap, good air movement, no crackles appreciated on anterior lung fields    Abdominal:      General: Bowel sounds are normal. There is no distension.      Palpations: Abdomen is soft.      Tenderness: There is no abdominal tenderness.   Musculoskeletal:         General: No swelling.   Skin:     General: Skin is warm and dry.   Neurological:      General: No focal deficit present.      Mental Status: He is easily aroused.             Significant Labs: All pertinent labs within the past 24 hours have been reviewed.  BMP:   Recent Labs   Lab 11/27/23  0532   *      K 3.4*   CL 96   CO2 31*   BUN 17   CREATININE 0.9   CALCIUM 8.8   MG 1.6       Significant Imaging: I have reviewed all pertinent imaging results/findings within the past 24 hours.

## 2023-11-27 NOTE — PLAN OF CARE
Problem: Adult Inpatient Plan of Care  Goal: Plan of Care Review  Outcome: Ongoing, Progressing  Goal: Patient-Specific Goal (Individualized)  Outcome: Ongoing, Progressing  Goal: Absence of Hospital-Acquired Illness or Injury  Outcome: Ongoing, Progressing  Goal: Optimal Comfort and Wellbeing  Outcome: Ongoing, Progressing  Goal: Readiness for Transition of Care  Outcome: Ongoing, Progressing     Problem: Diabetes Comorbidity  Goal: Blood Glucose Level Within Targeted Range  Outcome: Ongoing, Progressing     Problem: Fluid and Electrolyte Imbalance (Acute Kidney Injury/Impairment)  Goal: Fluid and Electrolyte Balance  Outcome: Ongoing, Progressing     Problem: Oral Intake Inadequate (Acute Kidney Injury/Impairment)  Goal: Optimal Nutrition Intake  Outcome: Ongoing, Progressing     Problem: Renal Function Impairment (Acute Kidney Injury/Impairment)  Goal: Effective Renal Function  Outcome: Ongoing, Progressing     Problem: Renal Function Impairment (Acute Kidney Injury/Impairment)  Goal: Effective Renal Function  Outcome: Ongoing, Progressing     Problem: Infection  Goal: Absence of Infection Signs and Symptoms  Outcome: Ongoing, Progressing     Problem: Impaired Wound Healing  Goal: Optimal Wound Healing  Outcome: Ongoing, Progressing     Problem: Fall Injury Risk  Goal: Absence of Fall and Fall-Related Injury  Outcome: Ongoing, Progressing   Mr. Dahl had an pleasant day. He is currently sitting in his chair with call light within reach, and wife at bedside. He is informed to call for assistance prior to getting out of his chair. Nonskid socks are on and will continue to monitor for any changes in care.

## 2023-11-27 NOTE — RESPIRATORY THERAPY
Pts family member states he is not a child,he isnt ready to go on. She said he will be ready at 2am. I will re attempt to set pt on bipap at that time.

## 2023-11-28 LAB
ALBUMIN SERPL BCP-MCNC: 2.5 G/DL (ref 3.5–5.2)
ALP SERPL-CCNC: 163 U/L (ref 55–135)
ALT SERPL W/O P-5'-P-CCNC: 22 U/L (ref 10–44)
ANION GAP SERPL CALC-SCNC: 12 MMOL/L (ref 8–16)
AST SERPL-CCNC: 14 U/L (ref 10–40)
BILIRUB SERPL-MCNC: 0.6 MG/DL (ref 0.1–1)
BUN SERPL-MCNC: 18 MG/DL (ref 8–23)
CALCIUM SERPL-MCNC: 8.7 MG/DL (ref 8.7–10.5)
CHLORIDE SERPL-SCNC: 95 MMOL/L (ref 95–110)
CO2 SERPL-SCNC: 32 MMOL/L (ref 23–29)
CREAT SERPL-MCNC: 0.9 MG/DL (ref 0.5–1.4)
EST. GFR  (NO RACE VARIABLE): >60 ML/MIN/1.73 M^2
GLUCOSE SERPL-MCNC: 114 MG/DL (ref 70–110)
MAGNESIUM SERPL-MCNC: 1.8 MG/DL (ref 1.6–2.6)
PHOSPHATE SERPL-MCNC: 3.9 MG/DL (ref 2.7–4.5)
POCT GLUCOSE: 122 MG/DL (ref 70–110)
POCT GLUCOSE: 175 MG/DL (ref 70–110)
POCT GLUCOSE: 188 MG/DL (ref 70–110)
POTASSIUM SERPL-SCNC: 3.1 MMOL/L (ref 3.5–5.1)
PROT SERPL-MCNC: 7.5 G/DL (ref 6–8.4)
SODIUM SERPL-SCNC: 139 MMOL/L (ref 136–145)
VANCOMYCIN SERPL-MCNC: 12.3 UG/ML

## 2023-11-28 PROCEDURE — 87449 NOS EACH ORGANISM AG IA: CPT | Performed by: INTERNAL MEDICINE

## 2023-11-28 PROCEDURE — 63600175 PHARM REV CODE 636 W HCPCS: Performed by: INTERNAL MEDICINE

## 2023-11-28 PROCEDURE — S4991 NICOTINE PATCH NONLEGEND: HCPCS

## 2023-11-28 PROCEDURE — 25000003 PHARM REV CODE 250

## 2023-11-28 PROCEDURE — 63600175 PHARM REV CODE 636 W HCPCS

## 2023-11-28 PROCEDURE — 84100 ASSAY OF PHOSPHORUS: CPT

## 2023-11-28 PROCEDURE — 25000003 PHARM REV CODE 250: Performed by: INTERNAL MEDICINE

## 2023-11-28 PROCEDURE — 86480 TB TEST CELL IMMUN MEASURE: CPT | Performed by: INTERNAL MEDICINE

## 2023-11-28 PROCEDURE — 36415 COLL VENOUS BLD VENIPUNCTURE: CPT

## 2023-11-28 PROCEDURE — 97530 THERAPEUTIC ACTIVITIES: CPT

## 2023-11-28 PROCEDURE — 36415 COLL VENOUS BLD VENIPUNCTURE: CPT | Performed by: INTERNAL MEDICINE

## 2023-11-28 PROCEDURE — 99900035 HC TECH TIME PER 15 MIN (STAT)

## 2023-11-28 PROCEDURE — 80053 COMPREHEN METABOLIC PANEL: CPT

## 2023-11-28 PROCEDURE — 80202 ASSAY OF VANCOMYCIN: CPT | Performed by: INTERNAL MEDICINE

## 2023-11-28 PROCEDURE — 83735 ASSAY OF MAGNESIUM: CPT

## 2023-11-28 PROCEDURE — 94761 N-INVAS EAR/PLS OXIMETRY MLT: CPT

## 2023-11-28 PROCEDURE — 20600001 HC STEP DOWN PRIVATE ROOM

## 2023-11-28 PROCEDURE — 97161 PT EVAL LOW COMPLEX 20 MIN: CPT

## 2023-11-28 PROCEDURE — 27100171 HC OXYGEN HIGH FLOW UP TO 24 HOURS

## 2023-11-28 PROCEDURE — 25000003 PHARM REV CODE 250: Performed by: STUDENT IN AN ORGANIZED HEALTH CARE EDUCATION/TRAINING PROGRAM

## 2023-11-28 RX ADMIN — FUROSEMIDE 40 MG: 10 INJECTION, SOLUTION INTRAVENOUS at 08:11

## 2023-11-28 RX ADMIN — FLUTICASONE PROPIONATE 100 MCG: 50 SPRAY, METERED NASAL at 08:11

## 2023-11-28 RX ADMIN — VANCOMYCIN HYDROCHLORIDE 1500 MG: 1.5 INJECTION, POWDER, LYOPHILIZED, FOR SOLUTION INTRAVENOUS at 02:11

## 2023-11-28 RX ADMIN — PIPERACILLIN SODIUM AND TAZOBACTAM SODIUM 4.5 G: 4; .5 INJECTION, POWDER, FOR SOLUTION INTRAVENOUS at 10:11

## 2023-11-28 RX ADMIN — GABAPENTIN 100 MG: 100 CAPSULE ORAL at 02:11

## 2023-11-28 RX ADMIN — ATORVASTATIN CALCIUM 20 MG: 20 TABLET, FILM COATED ORAL at 08:11

## 2023-11-28 RX ADMIN — PIPERACILLIN SODIUM AND TAZOBACTAM SODIUM 4.5 G: 4; .5 INJECTION, POWDER, FOR SOLUTION INTRAVENOUS at 05:11

## 2023-11-28 RX ADMIN — GABAPENTIN 100 MG: 100 CAPSULE ORAL at 08:11

## 2023-11-28 RX ADMIN — PIPERACILLIN SODIUM AND TAZOBACTAM SODIUM 4.5 G: 4; .5 INJECTION, POWDER, FOR SOLUTION INTRAVENOUS at 03:11

## 2023-11-28 RX ADMIN — HYDROCODONE BITARTRATE AND ACETAMINOPHEN 1 TABLET: 10; 325 TABLET ORAL at 09:11

## 2023-11-28 RX ADMIN — ASPIRIN 81 MG: 81 TABLET, COATED ORAL at 08:11

## 2023-11-28 RX ADMIN — Medication 1 PATCH: at 09:11

## 2023-11-28 RX ADMIN — ENOXAPARIN SODIUM 40 MG: 40 INJECTION SUBCUTANEOUS at 05:11

## 2023-11-28 RX ADMIN — NIFEDIPINE 30 MG: 30 TABLET, FILM COATED, EXTENDED RELEASE ORAL at 08:11

## 2023-11-28 RX ADMIN — HYDROCODONE BITARTRATE AND ACETAMINOPHEN 1 TABLET: 10; 325 TABLET ORAL at 03:11

## 2023-11-28 RX ADMIN — GABAPENTIN 100 MG: 100 CAPSULE ORAL at 09:11

## 2023-11-28 RX ADMIN — FOLIC ACID 1 MG: 1 TABLET ORAL at 08:11
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
## 2023-11-28 NOTE — PLAN OF CARE
Problem: Adult Inpatient Plan of Care  Goal: Plan of Care Review  Outcome: Ongoing, Progressing  Goal: Patient-Specific Goal (Individualized)  Outcome: Ongoing, Progressing  Goal: Absence of Hospital-Acquired Illness or Injury  Outcome: Ongoing, Progressing  Goal: Optimal Comfort and Wellbeing  Outcome: Ongoing, Progressing  Goal: Readiness for Transition of Care  Outcome: Ongoing, Progressing     Problem: Diabetes Comorbidity  Goal: Blood Glucose Level Within Targeted Range  Outcome: Ongoing, Progressing     Problem: Fluid and Electrolyte Imbalance (Acute Kidney Injury/Impairment)  Goal: Fluid and Electrolyte Balance  Outcome: Ongoing, Progressing     Problem: Oral Intake Inadequate (Acute Kidney Injury/Impairment)  Goal: Optimal Nutrition Intake  Outcome: Ongoing, Progressing     Problem: Renal Function Impairment (Acute Kidney Injury/Impairment)  Goal: Effective Renal Function  Outcome: Ongoing, Progressing     Problem: Fluid Imbalance (Pneumonia)  Goal: Fluid Balance  Outcome: Ongoing, Progressing     Problem: Infection (Pneumonia)  Goal: Resolution of Infection Signs and Symptoms  Outcome: Ongoing, Progressing     Problem: Respiratory Compromise (Pneumonia)  Goal: Effective Oxygenation and Ventilation  Outcome: Ongoing, Progressing     Problem: Impaired Wound Healing  Goal: Optimal Wound Healing  Outcome: Ongoing, Progressing     Problem: Infection  Goal: Absence of Infection Signs and Symptoms  Outcome: Ongoing, Progressing     Problem: Skin Injury Risk Increased  Goal: Skin Health and Integrity  Outcome: Ongoing, Progressing   Pt is in his chair with call light within reach, personal belongings within reach and informed to call for assistance prior to getting out of bed. Will continue to monitor for changes in care.

## 2023-11-28 NOTE — PROGRESS NOTES
Pharmacokinetic Assessment Follow Up: IV Vancomycin    Vancomycin serum concentration assessment(s):    The random level was drawn correctly and can be used to guide therapy at this time. The measurement is within the desired definitive target range of 10 to 20 mcg/mL.  - The random level was drawn ~12 hours after previous dose and resulted at 16.5.    Vancomycin Regimen Plan:    Will re-dose once now with vancomycin 1000 mg.  - Re-dose when the random level is less than 20 mcg/mL, next level to be drawn at 0600 on 11/28.  - Mr. Dahl's ELVIS is improving with his Scr trending  to baseline.  - I'm concerned to schedule him at this time since given his level is accumulating even with the few doses he's been getting. Will test by giving one dose of 1000 mg and checking level in 12 hours.   - Will continue pulse dosing for now while ELVIS improving and while assessing clearance.     Drug levels (last 3 results):  Recent Labs   Lab Result Units 11/25/23  1709 11/26/23  0301 11/26/23  2328 11/27/23  1358   Vancomycin, Random ug/mL  --  10.0 15.7 16.5   Vancomycin-Trough ug/mL 19.8  --   --   --        Pharmacy will continue to follow and monitor vancomycin.    Please contact pharmacy at extension [phone] for questions regarding this assessment.    Thank you for the consult,   Kristina King       Patient brief summary:  Christiano Dahl is a 62 y.o. male initiated on antimicrobial therapy with IV Vancomycin for treatment of lower respiratory infection    The patient's current regimen is pulse dosing while assessing appropriate regimen given his current clearance    Actual Body Weight:   101.5 kg    Renal Function:   Estimated Creatinine Clearance: 95.4 mL/min (based on SCr of 0.9 mg/dL).    Dialysis Method (if applicable):  N/A

## 2023-11-28 NOTE — NURSING
"Nurse completed assessment, vitals and met all pt needs. Pt sitting up in bedside chair with NADN and has a female visitor at bedside.V/S WNL for pt. On 2L NC. C/O pain when flushing midline but he refuses new IV attempt or new midline placement. MD is aware of this. Pt voices no other complaints of pain or concerns of any kind at this current time. "Female friend" voiced several concerns of midline use so midline and medication education was given. Pt. Request to still give IV medication through midline. After nurse exited pt. Rm. "Lady friend" requested per charge nurse for a new nurse to resume care or pt. Will give report to receiving nurse.   "

## 2023-11-28 NOTE — PLAN OF CARE
[facility] - Telemetry Stepdown  Initial Discharge Assessment       Primary Care Provider: Jaci Newton MD    Admission Diagnosis: Shortness of breath [R06.02]  Respiratory acidosis [E87.29]  Lethargic [R53.83]  Acute respiratory failure with hypercapnia [J96.02]    Admission Date: 11/24/2023  Expected Discharge Date: 11/29/2023    Transition of Care Barriers: None    Payor: MEDICAID / Plan: [facility] CONNECT / Product Type: Managed Medicaid /     Extended Emergency Contact Information  Primary Emergency Contact: SAQIB SIMEON  Mobile Phone: [phone]  Relation: Significant other  Preferred language: English   needed? No    Discharge Plan A: Home with family  Discharge Plan B: Home with family      [id number] - [place] - [address] AT [facility] [address]  [address]  [facility] [address]  Phone: [phone] Fax: [phone]    [facility] [address] [place] [address]  [address] [place] [address]  Phone: [phone] Fax: [phone]    [id number] - [place] - [address]  [address]  [place] [address]  Phone: [phone] Fax: [phone]      Initial Assessment (most recent)       Adult Discharge Assessment - 11/28/23 1519          Discharge Assessment    Assessment Type Discharge Planning Assessment     Confirmed/corrected address, phone number and insurance Yes     Confirmed Demographics Correct on Facesheet     Source of Information patient;family     Communicated YOSELYN with patient/caregiver Yes     Reason For Admission SOB     People in Home significant other     Do you expect to return to your current living situation? Yes     Do you have help at home or someone to help you manage your care at home? Yes     Who are your caregiver(s) and their phone number(s)? Saqib Simeon (s.o.) [phone]     Prior to hospitilization cognitive status:  Alert/Oriented     Current cognitive status: Alert/Oriented     Equipment Currently Used at Home none     Readmission within 30 days? Yes   Recently discharged from [facility] on 11/21.    Patient currently being followed by outpatient case management? No     Do you currently have service(s) that help you manage your care at home? No     Do you take prescription medications? Yes     Do you have prescription coverage? Yes     Do you have any problems affording any of your prescribed medications? No     Is the patient taking medications as prescribed? yes     Who is going to help you get home at discharge? Patient's spouse can provide transportation home.     How do you get to doctors appointments? car, drives self;family or friend will provide     Are you on dialysis? No     Do you take coumadin? No     DME Needed Upon Discharge  other (see comments)   TBD    Discharge Plan discussed with: Spouse/sig other;Patient     Transition of Care Barriers None     Discharge Plan A Home with family     Discharge Plan B Home with family                   Discharge Plan A and Plan B have been determined by review of patient's clinical status, future medical and therapeutic needs, and coverage/benefits for post-acute care in coordination with multidisciplinary team members.       Sharron Michael RN  Ext [phone]

## 2023-11-28 NOTE — PROGRESS NOTES
Pharmacokinetic Assessment Follow Up: IV Vancomycin    Vancomycin serum concentration assessment(s):    The random level was drawn correctly and can be used to guide therapy at this time. The measurement is below the desired definitive target range of 15 to 20 mcg/mL.    Vancomycin Regimen Plan:    Will re-dose once now with vancomycin 1500 mg.  - Re-dose when the random level is less than 20 mcg/mL, next level to be drawn at 0430 on 11/29.  - Will continue pulse dosing for now while ELVIS improving and while assessing clearance. Can likely schedule a regimen tomorrow if still needed.      Drug levels (last 3 results):  Recent Labs   Lab Result Units 11/25/23  1709 11/26/23  0301 11/26/23  2328 11/27/23  1358 11/28/23  0730   Vancomycin, Random ug/mL  --    < > 15.7 16.5 12.3   Vancomycin-Trough ug/mL 19.8  --   --   --   --     < > = values in this interval not displayed.         Pharmacy will continue to follow and monitor vancomycin.    Please contact pharmacy at extension [phone] for questions regarding this assessment.    Thank you for the consult,   Ara Orellana       Patient brief summary:  Christiano Dahl is a 62 y.o. male initiated on antimicrobial therapy with IV Vancomycin for treatment of lower respiratory infection    The patient's current regimen is pulse dosing in the setting of improving ELVIS    Actual Body Weight:   101.5 kg    Renal Function:   Estimated Creatinine Clearance: 95.4 mL/min (based on SCr of 0.9 mg/dL).     Dialysis Method (if applicable):  N/A

## 2023-11-28 NOTE — PLAN OF CARE
Problem: Physical Therapy  Goal: Physical Therapy Goal  Description: Goals to be met by: 23     Patient will increase functional independence with mobility by performin. Sit to stand transfer with [place]  2. Gait  x 500 feet with Supervision with or without LRAD.   3. Ascend/descend 6 stair with bilateral Handrails Stand-by Assistance with or without LRAD.   4. Stand for 7 minutes with [place] with or without LRAD  5. Lower extremity exercise program x30 reps per handout, with independence    Outcome: Ongoing, Progressing    Evaluation complete. Goals appropriate.

## 2023-11-28 NOTE — SUBJECTIVE & OBJECTIVE
Interval History: Has been getting infusions for  Hydrantitis Suppurative - brings up possibility of chronic infections - will check gold tb and fungitel.    Review of Systems  Objective:     Vital Signs (Most Recent):  Temp: 98.1 °F (36.7 °C) (11/27/23 2331)  Pulse: 64 (11/28/23 0539)  Resp: 16 (11/28/23 0539)  BP: 116/60 (11/27/23 2331)  SpO2: 98 % (11/28/23 0821) Vital Signs (24h Range):  Temp:  [98 °F (36.7 °C)-98.7 °F (37.1 °C)] 98.1 °F (36.7 °C)  Pulse:  [63-82] 64  Resp:  [14-20] 16  SpO2:  [92 %-98 %] 98 %  BP: (116-126)/(60-77) 116/60     Weight: 101.5 kg (223 lb 12.3 oz)  Body mass index is 35.05 kg/m².  No intake or output data in the 24 hours ending 11/28/23 0953      Physical Exam  Vitals and nursing note reviewed.   Constitutional:       General: He is sleeping.      Appearance: He is well-developed. He is obese.   HENT:      Head: Normocephalic.      Mouth/Throat:      Mouth: Mucous membranes are moist.   Eyes:      Pupils: Pupils are equal, round, and reactive to light.   Cardiovascular:      Rate and Rhythm: Normal rate and regular rhythm.      Pulses: Normal pulses.   Pulmonary:      Comments: On Bipap, good air movement, no crackles appreciated on anterior lung fields    Abdominal:      General: Bowel sounds are normal. There is no distension.      Palpations: Abdomen is soft.      Tenderness: There is no abdominal tenderness.   Musculoskeletal:         General: No swelling.   Skin:     General: Skin is warm and dry.   Neurological:      General: No focal deficit present.      Mental Status: He is easily aroused.             Significant Labs: All pertinent labs within the past 24 hours have been reviewed.  BMP:   Recent Labs   Lab 11/28/23  0430   *      K 3.1*   CL 95   CO2 32*   BUN 18   CREATININE 0.9   CALCIUM 8.7   MG 1.8       Significant Imaging: I have reviewed all pertinent imaging results/findings within the past 24 hours.

## 2023-11-29 VITALS
RESPIRATION RATE: 18 BRPM | TEMPERATURE: 99 F | BODY MASS INDEX: 35.05 KG/M2 | SYSTOLIC BLOOD PRESSURE: 119 MMHG | DIASTOLIC BLOOD PRESSURE: 74 MMHG | WEIGHT: 223.75 LBS | HEART RATE: 71 BPM | OXYGEN SATURATION: 98 %

## 2023-11-29 LAB
1,3 BETA GLUCAN SER-MCNC: <31 PG/ML
BACTERIA BLD CULT: NORMAL
BACTERIA BLD CULT: NORMAL
FUNGITELL COMMENTS: NEGATIVE
GAMMA INTERFERON BACKGROUND BLD IA-ACNC: 0.04 IU/ML
M TB IFN-G CD4+ BCKGRND COR BLD-ACNC: 0.01 IU/ML
M TB IFN-G CD4+ BCKGRND COR BLD-ACNC: 0.03 IU/ML
MITOGEN IGNF BCKGRD COR BLD-ACNC: 2.11 IU/ML
TB GOLD PLUS: NEGATIVE

## 2023-11-29 PROCEDURE — 94660 CPAP INITIATION&MGMT: CPT

## 2023-11-29 PROCEDURE — 27100171 HC OXYGEN HIGH FLOW UP TO 24 HOURS

## 2023-11-29 PROCEDURE — 63600175 PHARM REV CODE 636 W HCPCS

## 2023-11-29 PROCEDURE — 27000221 HC OXYGEN, UP TO 24 HOURS

## 2023-11-29 PROCEDURE — 99900035 HC TECH TIME PER 15 MIN (STAT)

## 2023-11-29 PROCEDURE — 25000003 PHARM REV CODE 250

## 2023-11-29 PROCEDURE — 94761 N-INVAS EAR/PLS OXIMETRY MLT: CPT

## 2023-11-29 RX ADMIN — PIPERACILLIN SODIUM AND TAZOBACTAM SODIUM 4.5 G: 4; .5 INJECTION, POWDER, FOR SOLUTION INTRAVENOUS at 12:11

## 2023-11-29 NOTE — PLAN OF CARE
[facility] - Telemetry Stepdown  Discharge Final Note    Primary Care Provider: Jaci Newton MD    Expected Discharge Date: 11/29/2023    Final Discharge Note (most recent)       Final Note - 11/29/23 0905          Final Note    Assessment Type Final Discharge Note     Anticipated Discharge Disposition Left Against Medical Advice     What phone number can be called within the next 1-3 days to see how you are doing after discharge? [phone]                     Important Message from Medicare      Sharron Michael RN  Ext [phone]

## 2023-12-05 ENCOUNTER — DOCUMENTATION ONLY (OUTPATIENT)
Dept: CARDIOLOGY | Facility: CLINIC | Age: 63
End: 2023-12-05
Payer: MEDICAID

## 2023-12-05 NOTE — PROGRESS NOTES
[facility] Phone Enrollment Complete.    Phone enrollment completed due to :PT was Covid + in Hospital    Called and spoke to PT via phone. Introduced self to pt as HFTCC nurse navigator.      Patient education will be given:On this call     Reviewed the following key points of HFTCC program with pt and family:              1.) Take your medications as directed.               2.) Weight yourself daily              3.) Follow low salt and limited fluid diet.               4.) Stop smoking and start exercising              5.) Go to your appointments and call your team.          Reviewed plan for follow up once discharged to include phone calls, in person and virtual visits to assist pt optimizing their heart failure medication regimen and encouraging healthy lifestyle modifications.  Reminded pt that program will assist them over the next 4-6 weeks and then patient will be transferred to long term care provider .  Reminded pt how to contact HFTCC navigator via phone and or via [employer].      Pt given appointment today via phone. PT wants to think about it, requests a call next week, I tell PT I will call him Friday AM 12-8.     Pt also reminded RN will call 48-72 hours after discharge to check on them.      PT will get Home Care Guide at first appointment.    PT can verbalize read back of some of the  information given.  Encouraged pt to read over information often and contact team with any questions or concerns.

## 2023-12-07 ENCOUNTER — TELEPHONE (OUTPATIENT)
Dept: CARDIOLOGY | Facility: CLINIC | Age: 63
End: 2023-12-07
Payer: MEDICAID

## 2023-12-07 NOTE — TELEPHONE ENCOUNTER
Heart Failure [facility]itional [facility] ([facility]) Team notified of pt referral via [facility] (automated inbasket notification) .    Patient screened today 12-5-2023 by provider and RN for enrollment to program.      Pt was deemed not a candidate for enrollment at this time related to patient refused.  Phone enrollment done Tuesday 12-5, PT wanted to take time to think and today he has refused enrollment.    Pt will require additional follow up planning per primary team.     If pt status, diagnosis, or treatment plan changes , please place AMB referral to [facility] ([id number]) for HFT[facility] enrollment re-evalution.

## 2023-12-19 NOTE — DISCHARGE SUMMARY
[facility] - Telemetry [facility] Medicine  Discharge Summary      Patient Name: Christiano Dahl  MRN: [id number]  SHELIA: [id number]  Patient Class: IP- Inpatient  Admission Date: 11/24/2023  Hospital Length of Stay: 5 days  Discharge Date and Time: 11/29/2023  5:48 AM  Attending Physician: No att. providers found   Discharging Provider: Davis Villalpando MD  Primary Care Provider: Jaci Newton MD  [facility] Medicine Team: [facility] R Davis Villalpando MD  Primary Care Team: [facility] R    HPI:   No notes on file    * No surgery found *      Hospital Course:   Acute hypercapnic respiratory failure  Patient with Hypercapnic Respiratory failure which is Acute on chronic.  he is not on home oxygen. Supplemental oxygen was provided and noted- Oxygen Concentration (%):  [40] 40     Signs/symptoms of respiratory failure include- increased work of breathing and lethargy. Contributing diagnoses includes - CHF and Pleural effusion Labs and images were reviewed. Patient Has recent ABG, which has been reviewed. 7.3/60.4/35/31.1 Will treat underlying causes and adjust management of respiratory failure as follows-      -BIPAP 16/5 25%, Will continue to wean overnight  -Recent admission 11/19-11/21 for Hypoxic respiratory failure 2/2 CAP/ COVID  -WBC to 17 in the ED  -Continue broad spectrum abx with Vanc zosyn  -F/u flu/covid/RSV  -Diuresis for goal net negative  -Will need outpatient sleep on discharge     ELVIS (acute kidney injury)  Patient with acute kidney injury/acute renal failure likely due to pre-renal azotemia due to dehydration ELVIS is currently improving. Baseline creatinine - Labs reviewed- Renal function/electrolytes with Estimated Creatinine Clearance: 85.9 mL/min (based on SCr of 1 mg/dL). according to latest data. Monitor urine output and serial BMP and adjust therapy as needed. Avoid nephrotoxins and renally dose meds for GFR listed above.     Diabetes mellitus, type 2  Patient's FSGs are uncontrolled due  "to hyperglycemia on current medication regimen.  Last A1c reviewed-     Was going to arrange home  BIPAP and PT, but overnight patient left AMA. I was not on duty then and circumstances are not documented.  Will try to contact patient and family.     Goals of Care Treatment Preferences:  Code Status: Full Code      Consults:   Consults (From admission, onward)          Status Ordering Provider     Inpatient consult to Critical Care Medicine  Once        Provider:  (Not yet assigned)    KEYANNA Sparks            No new Assessment & Plan notes have been filed under this hospital service since the last note was generated.  Service: Hospital Medicine    Final Active Diagnoses:    Diagnosis Date Noted POA    PRINCIPAL PROBLEM:  Acute hypercapnic respiratory failure [J96.02] 11/19/2023 Yes    Acute on chronic diastolic heart failure [I50.33] 11/24/2023 Yes    Elevated troponin [R79.89] 10/28/2023 Yes    ELVIS (acute kidney injury) [N17.9] 08/18/2023 Yes    Diabetes mellitus, type 2 [E11.9] 08/20/2016 Yes    Hidradenitis suppurativa [L73.2] 05/04/2016 Yes      Problems Resolved During this Admission:    Diagnosis Date Noted Date Resolved POA    COVID [U07.1] 11/24/2023 11/24/2023 Yes       Discharged Condition: good    Disposition: Left Against Medical Adv*    Follow Up:    Patient Instructions:   No discharge procedures on file.    Significant Diagnostic Studies: Labs: BMP: No results for input(s): "GLU", "NA", "K", "CL", "CO2", "BUN", "CREATININE", "CALCIUM", "MG" in the last 48 hours.    Pending Diagnostic Studies:       None           Medications:  Reconciled Home Medications:      Medication List        ASK your doctor about these medications      acetaminophen 500 MG tablet  Commonly known as: TYLENOL  Take 2 tablets (1,000 mg total) by mouth every 8 (eight) hours as needed for Pain.     albuterol 90 mcg/actuation inhaler  Commonly known as: PROVENTIL/VENTOLIN HFA  Inhale 2 puffs into the lungs every 6 " (six) hours as needed for Wheezing or Shortness of Breath. Rescue     ascorbic acid (vitamin C) 500 MG tablet  Commonly known as: VITAMIN C  Take 1 tablet (500 mg total) by mouth once daily.     aspirin 81 MG EC tablet  Commonly known as: ECOTRIN  Take 1 tablet (81 mg total) by mouth once daily.     atorvastatin 20 MG tablet  Commonly known as: LIPITOR  Take 1 tablet (20 mg total) by mouth once daily.     baclofen 10 MG tablet  Commonly known as: LIORESAL  Take 1 tablet (10 mg total) by mouth 2 (two) times daily as needed (muscle spasms).     cefdinir 300 MG capsule  Commonly known as: OMNICEF  Take 1 capsule (300 mg total) by mouth 2 (two) times daily. for 2 days  Ask about: Should I take this medication?     CONTOUR TEST STRIPS Strp  Generic drug: blood sugar diagnostic  Use to check blood sugar once daily     cyanocobalamin 1000 MCG tablet  Commonly known as: VITAMIN B-12  Take 1 tablet (1,000 mcg total) by mouth once daily.     doxycycline 100 MG tablet  Commonly known as: VIBRA-TABS  Take 1 tablet (100 mg total) by mouth every 12 (twelve) hours. for 2 days  Ask about: Should I take this medication?     fluticasone propionate 50 mcg/actuation nasal spray  Commonly known as: FLONASE  Use 2 sprays (100 mcg total) by Each Nostril route once daily.     folic acid 1 MG tablet  Commonly known as: FOLVITE  Take 1 tablet (1 mg total) by mouth once daily.     furosemide 20 MG tablet  Commonly known as: LASIX  Take 2 tablets (40 mg) by mouth twice daily starting 11/22 for 7 days, last day 11/28. On 11/29, take 2 tablets (40 mg) by mouth once daily until follow up with PCP and/or cardiology     gabapentin 800 MG tablet  Commonly known as: NEURONTIN  Take 1 tablet (800 mg total) by mouth 3 (three) times daily.     HYDROcodone-acetaminophen  mg per tablet  Commonly known as: NORCO  Take 1 tablet by mouth every 4 (four) hours as needed for Pain.  Ask about: Should I take this medication?     JARDIANCE 10 mg  tablet  Generic drug: empagliflozin  Take 1 tablet (10 mg total) by mouth once daily.     ketoconazole 2 % shampoo  Commonly known as: NIZORAL  Apply topically as needed for Itching.     LIDOcaine 5 %  Commonly known as: LIDODERM  Place 1 patch onto the skin once daily. Remove & Discard patch within 12 hours or as directed by MD     losartan 100 MG tablet  Commonly known as: COZAAR  TAKE 1 TABLET(100 MG) BY MOUTH EVERY DAY FOR BLOOD PRESSURE     metFORMIN 500 MG ER 24hr tablet  Commonly known as: GLUCOPHAGE-XR  Take 2 tablets (1,000 mg total) by mouth every morning.     MUCUS RELIEF  mg 12 hr tablet  Generic drug: guaiFENesin  Take 2 tablets (1,200 mg total) by mouth 2 (two) times daily. for 5 days  Ask about: Should I take this medication?     nicotine 21 mg/24 hr  Commonly known as: NICODERM CQ  Place 1 patch onto the skin once daily.     NIFEdipine 30 MG (OSM) 24 hr tablet  Commonly known as: PROCARDIA-XL  Take 1 tablet (30 mg total) by mouth once daily.     ondansetron 4 MG Tbdl  Commonly known as: ZOFRAN-ODT  Take 1 tablet (4 mg total) by mouth every 8 (eight) hours as needed (nausea/vomiting).  Ask about: Should I take this medication?     potassium chloride SA 20 MEQ tablet  Commonly known as: K-DUR,KLOR-CON  Take 1 tablet (20 mEq total) by mouth once daily.     sodium chloride 0.9% SolP with inFLIXimab 100 mg SolR 5 mg/kg  Inject 800 mg into the vein every 28 days.     vitamin D 1000 units Tab  Commonly known as: VITAMIN D3  Take 1 tablet (1,000 Units total) by mouth once daily.              Indwelling Lines/Drains at time of discharge:   Lines/Drains/Airways       None                   Time spent on the discharge of patient: 15 minutes         Davis Villalpando MD  Department of Hospital Medicine  [facility] - Telemetry Stepdown

## 2023-12-19 NOTE — HOSPITAL COURSE
Acute hypercapnic respiratory failure  Patient with Hypercapnic Respiratory failure which is Acute on chronic.  he is not on home oxygen. Supplemental oxygen was provided and noted- Oxygen Concentration (%):  [40] 40     Signs/symptoms of respiratory failure include- increased work of breathing and lethargy. Contributing diagnoses includes - CHF and Pleural effusion Labs and images were reviewed. Patient Has recent ABG, which has been reviewed. 7.3/60.4/35/31.1 Will treat underlying causes and adjust management of respiratory failure as follows-      -BIPAP 16/5 25%, Will continue to wean overnight  -Recent admission 11/19-11/21 for Hypoxic respiratory failure 2/2 CAP/ COVID  -WBC to 17 in the ED  -Continue broad spectrum abx with Vanc zosyn  -F/u flu/covid/RSV  -Diuresis for goal net negative  -Will need outpatient sleep on discharge     ELVIS (acute kidney injury)  Patient with acute kidney injury/acute renal failure likely due to pre-renal azotemia due to dehydration ELVIS is currently improving. Baseline creatinine - Labs reviewed- Renal function/electrolytes with Estimated Creatinine Clearance: 85.9 mL/min (based on SCr of 1 mg/dL). according to latest data. Monitor urine output and serial BMP and adjust therapy as needed. Avoid nephrotoxins and renally dose meds for GFR listed above.     Diabetes mellitus, type 2  Patient's FSGs are uncontrolled due to hyperglycemia on current medication regimen.  Last A1c reviewed-     Was going to arrange home  BIPAP and PT, but overnight patient left AMA. I was not on duty then and circumstances are not documented.  Will try to contact patient and family.

## 2024-02-19 PROBLEM — J18.9 CAP (COMMUNITY ACQUIRED PNEUMONIA): Status: RESOLVED | Noted: 2023-11-19 | Resolved: 2024-02-19

## 2024-02-26 PROBLEM — N17.9 AKI (ACUTE KIDNEY INJURY): Status: RESOLVED | Noted: 2023-08-18 | Resolved: 2024-02-26

## 2024-02-26 PROBLEM — J96.02 ACUTE HYPERCAPNIC RESPIRATORY FAILURE: Status: RESOLVED | Noted: 2023-11-19 | Resolved: 2024-02-26

## 2024-02-28 NOTE — PROGRESS NOTES
David Purvis - Telemetry [facility] Medicine  Progress Note     Patient Name: Christiano Dahl  MRN: [id number]  Patient Class: IP- Inpatient     Admission Date: 11/24/2023  Length of Stay: 3 days  Attending Physician: Davis Villalpando MD  Primary Care Provider: Jaci Newton MD           Subjective:      Principal Problem:Acute hypercapnic respiratory failure           HPI:  No notes on file     Overview/Hospital Course:  No notes on file     Interval History: Tolerating and needing BIPAP at nighty, will consult PT, may dc home in am.     Review of Systems  Objective:      Vital Signs (Most Recent):  Temp: 98.9 °F (37.2 °C) (11/27/23 0814)  Pulse: 73 (11/27/23 0814)  Resp: 18 (11/26/23 2355)  BP: 134/63 (11/27/23 0814)  SpO2: 95 % (11/27/23 0814) Vital Signs (24h Range):  Temp:  [98.5 °F (36.9 °C)-99.3 °F (37.4 °C)] 98.9 °F (37.2 °C)  Pulse:  [65-77] 73  Resp:  [17-20] 18  SpO2:  [89 %-100 %] 95 %  BP: (102-134)/(55-63) 134/63      Weight: 101.5 kg (223 lb 12.3 oz)  Body mass index is 35.05 kg/m².     Intake/Output Summary (Last 24 hours) at 11/27/2023 0919  Last data filed at 11/26/2023 1642      Gross per 24 hour   Intake 1170 ml   Output 700 ml   Net 470 ml         Physical Exam  Vitals and nursing note reviewed.   Constitutional:       General: He is sleeping.      Appearance: He is well-developed. He is obese.   HENT:      Head: Normocephalic.      Mouth/Throat:      Mouth: Mucous membranes are moist.   Eyes:      Pupils: Pupils are equal, round, and reactive to light.   Cardiovascular:      Rate and Rhythm: Normal rate and regular rhythm.      Pulses: Normal pulses.   Pulmonary:      Comments: On Bipap, good air movement, no crackles appreciated on anterior lung fields    Abdominal:      General: Bowel sounds are normal. There is no distension.      Palpations: Abdomen is soft.      Tenderness: There is no abdominal tenderness.   Musculoskeletal:         General: No swelling.   Skin:     General: Skin is  warm and dry.   Neurological:      General: No focal deficit present.      Mental Status: He is easily aroused.                Significant Labs: All pertinent labs within the past 24 hours have been reviewed.  BMP:       Recent Labs   Lab 11/27/23  0532   *      K 3.4*   CL 96   CO2 31*   BUN 17   CREATININE 0.9   CALCIUM 8.8   MG 1.6         Significant Imaging: I have reviewed all pertinent imaging results/findings within the past 24 hours.     Assessment/Plan:      * Acute hypercapnic respiratory failure  Patient with Hypercapnic Respiratory failure which is Acute on chronic.  he is not on home oxygen. Supplemental oxygen was provided and noted- Oxygen Concentration (%):  [40] 40     Signs/symptoms of respiratory failure include- increased work of breathing and lethargy. Contributing diagnoses includes - CHF and Pleural effusion Labs and images were reviewed. Patient Has recent ABG, which has been reviewed. 7.3/60.4/35/31.1 Will treat underlying causes and adjust management of respiratory failure as follows-      -BIPAP 16/5 25%, Will continue to wean overnight  -Recent admission 11/19-11/21 for Hypoxic respiratory failure 2/2 CAP/ COVID  -WBC to 17 in the ED  -Continue broad spectrum abx with Vanc zosyn  -F/u flu/covid/RSV  -Diuresis for goal net negative  -Will need outpatient sleep on discharge     ELVIS (acute kidney injury)  Patient with acute kidney injury/acute renal failure likely due to pre-renal azotemia due to dehydration ELVIS is currently improving. Baseline creatinine - Labs reviewed- Renal function/electrolytes with Estimated Creatinine Clearance: 85.9 mL/min (based on SCr of 1 mg/dL). according to latest data. Monitor urine output and serial BMP and adjust therapy as needed. Avoid nephrotoxins and renally dose meds for GFR listed above.     Diabetes mellitus, type 2  Patient's FSGs are uncontrolled due to hyperglycemia on current medication regimen.  Last A1c reviewed-         Lab Results    Component Value Date     HGBA1C 8.1 (H) 10/05/2023      Most recent fingerstick glucose reviewed-        Recent Labs   Lab 11/26/23  0840 11/26/23  1215   POCTGLUCOSE 133* 188*      Current correctional scale  Medium  Decrease anti-hyperglycemic dose as follows-   Antihyperglycemics (From admission, onward)        Start     Stop Route Frequency Ordered     11/24/23 2330   insulin aspart U-100 pen 0-5 Units         -- SubQ Before meals & nightly PRN 11/24/23 2230             Hold Oral hypoglycemics while patient is in the hospital.        VTE Risk Mitigation (From admission, onward)              Ordered       enoxaparin injection 40 mg  Daily         11/24/23 2129       IP VTE HIGH RISK PATIENT  Once         11/24/23 2129       Place sequential compression device  Until discontinued         11/24/23 2129                          Discharge Planning   YOSELYN:      Code Status: Full Code   Is the patient medically ready for discharge?:     Reason for patient still in hospital (select all that apply): Patient unstable                        Davis Villalpando MD  Department of Hospital Medicine   [facility] - Telemetry Stepdown

## 2024-05-31 ENCOUNTER — TELEPHONE (OUTPATIENT)
Dept: DERMATOLOGY | Facility: CLINIC | Age: 64
End: 2024-05-31
Payer: MEDICAID

## 2024-05-31 NOTE — TELEPHONE ENCOUNTER
Unfortunately at this time, our medicaid slots are full. Please use the below resources to clinics in the area with sooner medicaid availability.     MEDICAID RESOURCES     [facility]  3rd floor of ambulatory building (usually [address])  [address] [place] [address]  appt tel: [phone]  fax #: [phone]  Staff: Dr. Ruth Richardson  Children 17 and under will be accepted-soonest available 2 months out     [facility] ([facility] or [facility])  Clinic 4B   [address] [place] [address]  [facility] scheduling tel: [phone]   Adults 18 and over-soonest available 1 month out  [facility]- Only children under 15  [phone] Or Book online at [employer]  [address]  [place] [address]  In the [facility] office building  Only sees children under 15  JEROMY Joshi  Soonest appointment in 4 weeks  M-F 8-4:30p and 8-5:30p on Thursday      [facility]- ALL AGES   [place] [phone]             [address]             [place] [address]             Soonest available 3 weeks  [place] [phone]             [address] [address]             Soonest available 4 weeks  [place] [phone]             [address] [address]             Soonest available 2 weeks             ----- Message from Tg Goldsmith LPN sent at 5/30/2024  1:47 PM CDT -----  Contact: [phone]    ----- Message -----  From: Quinn Hernandez  Sent: 5/29/2024  12:28 PM CDT  To: Carl Bull Staff    Would like to receive medical advice.    Would they like a call back or a response via [facility]:  call back    Additional information:  Calling to speak with office office about the above pt. Also would like to know if the office accpets medicaid insurance.

## 2024-08-04 ENCOUNTER — OFFICE VISIT (OUTPATIENT)
Dept: URGENT CARE | Facility: CLINIC | Age: 64
End: 2024-08-04
Payer: MEDICAID

## 2024-08-04 VITALS
DIASTOLIC BLOOD PRESSURE: 76 MMHG | HEART RATE: 90 BPM | TEMPERATURE: 97 F | OXYGEN SATURATION: 97 % | HEIGHT: 67 IN | BODY MASS INDEX: 35 KG/M2 | RESPIRATION RATE: 17 BRPM | SYSTOLIC BLOOD PRESSURE: 124 MMHG | WEIGHT: 223 LBS

## 2024-08-04 DIAGNOSIS — Z20.7 SCABIES EXPOSURE: Primary | ICD-10-CM

## 2024-08-04 PROCEDURE — 99213 OFFICE O/P EST LOW 20 MIN: CPT | Mod: ,,,

## 2024-08-04 RX ORDER — HYDROXYZINE HYDROCHLORIDE 25 MG/1
25 TABLET, FILM COATED ORAL 3 TIMES DAILY
Qty: 30 TABLET | Refills: 0 | Status: SHIPPED | OUTPATIENT
Start: 2024-08-04 | End: 2024-08-04

## 2024-08-04 RX ORDER — HYDROXYZINE HYDROCHLORIDE 25 MG/1
25 TABLET, FILM COATED ORAL 3 TIMES DAILY
Qty: 30 TABLET | Refills: 0 | Status: SHIPPED | OUTPATIENT
Start: 2024-08-04

## 2024-08-04 RX ORDER — PERMETHRIN 50 MG/G
CREAM TOPICAL ONCE
Qty: 60 G | Refills: 1 | Status: SHIPPED | OUTPATIENT
Start: 2024-08-04 | End: 2024-08-04

## 2025-01-17 ENCOUNTER — HOSPITAL ENCOUNTER (OUTPATIENT)
Dept: RADIOLOGY | Facility: HOSPITAL | Age: 65
Discharge: HOME | End: 2025-01-17
Attending: ANESTHESIOLOGY
Payer: MEDICAID

## 2025-01-17 DIAGNOSIS — M47.816 OSTEOARTHRITIS OF LUMBAR SPINE: ICD-10-CM

## 2025-01-17 DIAGNOSIS — M47.896 OTHER OSTEOARTHRITIS OF SPINE, LUMBAR REGION: ICD-10-CM

## 2025-01-17 DIAGNOSIS — M47.896 OTHER OSTEOARTHRITIS OF SPINE, LUMBAR REGION: Primary | ICD-10-CM

## 2025-01-17 DIAGNOSIS — M47.816 LUMBAR SPONDYLOSIS: Primary | ICD-10-CM

## 2025-01-17 PROCEDURE — 72110 X-RAY EXAM L-2 SPINE 4/>VWS: CPT | Mod: TC

## 2025-01-17 PROCEDURE — 72110 X-RAY EXAM L-2 SPINE 4/>VWS: CPT | Mod: 26,,, | Performed by: RADIOLOGY

## 2025-04-14 DIAGNOSIS — M47.816 LUMBAR SPONDYLOSIS: ICD-10-CM

## 2025-04-14 DIAGNOSIS — M47.896 OTHER OSTEOARTHRITIS OF SPINE, LUMBAR REGION: Primary | ICD-10-CM

## 2025-04-14 DIAGNOSIS — S32.000A COMPRESSION OF LUMBAR VERTEBRA: ICD-10-CM
